# Patient Record
Sex: MALE | Race: WHITE | NOT HISPANIC OR LATINO | Employment: OTHER | URBAN - METROPOLITAN AREA
[De-identification: names, ages, dates, MRNs, and addresses within clinical notes are randomized per-mention and may not be internally consistent; named-entity substitution may affect disease eponyms.]

---

## 2020-08-20 LAB
BUN SERPL-MCNC: 26 MG/DL
CHLORIDE SERPL-SCNC: 106 MEQ/L
CO2 SERPL-SCNC: 25 MEQ/L
CREAT SERPL-MCNC: 1 MG/DL
EXTERNAL HEMATOCRIT: 27 %
EXTERNAL HEMOGLOBIN: 12.3 G/DL
EXTERNAL PLATELET COUNT: 212 K/ΜL
EXTERNAL PT (PROTIME): 11.9
EXTERNAL WBC: 13.6 G/DL
GLUCOSE SERPL-MCNC: 130 MG/DL
INR PPP: 1.1
POTASSIUM SERPL-SCNC: 4.5 MEQ/L
SODIUM SERPL-SCNC: 140 MEQ/L

## 2020-08-21 ENCOUNTER — HOSPITAL ENCOUNTER (INPATIENT)
Facility: REHABILITATION | Age: 70
LOS: 8 days | Discharge: HOME | DRG: 949 | End: 2020-08-29
Attending: PHYSICAL MEDICINE & REHABILITATION | Admitting: PHYSICAL MEDICINE & REHABILITATION
Payer: COMMERCIAL

## 2020-08-21 ENCOUNTER — BMR PREADMISSION ASSESSMENT (OUTPATIENT)
Dept: ADMISSIONS | Facility: REHABILITATION | Age: 70
End: 2020-08-21

## 2020-08-21 VITALS
OXYGEN SATURATION: 95 % | HEIGHT: 69 IN | WEIGHT: 165 LBS | BODY MASS INDEX: 24.44 KG/M2 | SYSTOLIC BLOOD PRESSURE: 116 MMHG | TEMPERATURE: 98.1 F | HEART RATE: 55 BPM | DIASTOLIC BLOOD PRESSURE: 65 MMHG

## 2020-08-21 DIAGNOSIS — C43.9 METASTATIC MELANOMA (CMS/HCC): Primary | ICD-10-CM

## 2020-08-21 DIAGNOSIS — F43.21 ADJUSTMENT DISORDER WITH DEPRESSED MOOD: ICD-10-CM

## 2020-08-21 PROBLEM — R56.9 SEIZURE (CMS/HCC): Status: ACTIVE | Noted: 2020-08-21

## 2020-08-21 PROBLEM — R52 PAIN: Status: ACTIVE | Noted: 2020-08-21

## 2020-08-21 PROBLEM — I10 HTN (HYPERTENSION): Status: ACTIVE | Noted: 2020-08-21

## 2020-08-21 PROBLEM — Z91.89 AT HIGH RISK FOR PRESSURE INJURY OF SKIN: Status: ACTIVE | Noted: 2020-08-21

## 2020-08-21 PROBLEM — E78.5 HYPERLIPIDEMIA: Status: ACTIVE | Noted: 2020-08-21

## 2020-08-21 PROBLEM — Z91.81 RISK FOR FALLS: Status: ACTIVE | Noted: 2020-08-21

## 2020-08-21 PROBLEM — C79.31 BRAIN METASTASES: Status: ACTIVE | Noted: 2020-08-21

## 2020-08-21 PROBLEM — Z91.89 AT HIGH RISK FOR DEEP VENOUS THROMBOSIS: Status: ACTIVE | Noted: 2020-08-21

## 2020-08-21 PROBLEM — Z09 FOLLOW UP: Status: ACTIVE | Noted: 2020-08-21

## 2020-08-21 PROBLEM — Z98.890 S/P CRANIOTOMY: Status: ACTIVE | Noted: 2020-08-21

## 2020-08-21 LAB
GLUCOSE BLD-MCNC: 129 MG/DL (ref 70–99)
GLUCOSE BLD-MCNC: 189 MG/DL (ref 70–99)
POCT TEST: ABNORMAL
POCT TEST: ABNORMAL

## 2020-08-21 PROCEDURE — 63700000 HC SELF-ADMINISTRABLE DRUG: Performed by: PHYSICAL MEDICINE & REHABILITATION

## 2020-08-21 PROCEDURE — 63600000 HC DRUGS/DETAIL CODE: Performed by: PHYSICAL MEDICINE & REHABILITATION

## 2020-08-21 PROCEDURE — 93005 ELECTROCARDIOGRAM TRACING: CPT | Performed by: PHYSICAL MEDICINE & REHABILITATION

## 2020-08-21 PROCEDURE — 63600000 HC DRUGS/DETAIL CODE: Performed by: STUDENT IN AN ORGANIZED HEALTH CARE EDUCATION/TRAINING PROGRAM

## 2020-08-21 PROCEDURE — 12800001 HC ROOM AND CARE SEMIPRIVATE REHAB-BRAIN INJ

## 2020-08-21 RX ORDER — IBUPROFEN 200 MG
16-32 TABLET ORAL AS NEEDED
Status: DISCONTINUED | OUTPATIENT
Start: 2020-08-21 | End: 2020-08-29 | Stop reason: HOSPADM

## 2020-08-21 RX ORDER — DEXAMETHASONE 2 MG/1
2 TABLET ORAL EVERY 12 HOURS
Status: DISCONTINUED | OUTPATIENT
Start: 2020-08-30 | End: 2020-08-29 | Stop reason: HOSPADM

## 2020-08-21 RX ORDER — ONDANSETRON 4 MG/1
4 TABLET, ORALLY DISINTEGRATING ORAL EVERY 8 HOURS PRN
Status: DISCONTINUED | OUTPATIENT
Start: 2020-08-21 | End: 2020-08-29 | Stop reason: HOSPADM

## 2020-08-21 RX ORDER — LISINOPRIL 10 MG/1
10 TABLET ORAL DAILY
Status: DISCONTINUED | OUTPATIENT
Start: 2020-08-22 | End: 2020-08-29 | Stop reason: HOSPADM

## 2020-08-21 RX ORDER — LEVETIRACETAM 500 MG/1
1000 TABLET ORAL 2 TIMES DAILY
Status: DISCONTINUED | OUTPATIENT
Start: 2020-08-21 | End: 2020-08-28

## 2020-08-21 RX ORDER — DEXAMETHASONE 2 MG/1
2 TABLET ORAL DAILY
Status: DISCONTINUED | OUTPATIENT
Start: 2020-09-03 | End: 2020-08-29 | Stop reason: HOSPADM

## 2020-08-21 RX ORDER — HEPARIN SODIUM 5000 [USP'U]/ML
5000 INJECTION, SOLUTION INTRAVENOUS; SUBCUTANEOUS EVERY 8 HOURS
Status: DISCONTINUED | OUTPATIENT
Start: 2020-08-21 | End: 2020-08-29 | Stop reason: HOSPADM

## 2020-08-21 RX ORDER — HEPARIN SODIUM 5000 [USP'U]/ML
INJECTION, SOLUTION INTRAVENOUS; SUBCUTANEOUS
COMMUNITY
End: 2020-08-29 | Stop reason: HOSPADM

## 2020-08-21 RX ORDER — ACETAMINOPHEN AND CODEINE PHOSPHATE 300; 30 MG/1; MG/1
1 TABLET ORAL EVERY 4 HOURS PRN
COMMUNITY
End: 2020-08-29 | Stop reason: HOSPADM

## 2020-08-21 RX ORDER — DEXTROSE 50 % IN WATER (D50W) INTRAVENOUS SYRINGE
25 AS NEEDED
Status: DISCONTINUED | OUTPATIENT
Start: 2020-08-21 | End: 2020-08-29 | Stop reason: HOSPADM

## 2020-08-21 RX ORDER — DEXAMETHASONE 4 MG/1
4 TABLET ORAL 2 TIMES DAILY WITH MEALS
COMMUNITY
End: 2020-08-29 | Stop reason: HOSPADM

## 2020-08-21 RX ORDER — DEXAMETHASONE 4 MG/1
4 TABLET ORAL EVERY 12 HOURS
Status: DISCONTINUED | OUTPATIENT
Start: 2020-08-25 | End: 2020-08-29 | Stop reason: HOSPADM

## 2020-08-21 RX ORDER — ONDANSETRON 4 MG/1
4 TABLET, ORALLY DISINTEGRATING ORAL EVERY 8 HOURS PRN
Status: ON HOLD | COMMUNITY
End: 2020-08-27 | Stop reason: SDUPTHER

## 2020-08-21 RX ORDER — LEVETIRACETAM 1000 MG/1
1000 TABLET ORAL 2 TIMES DAILY
Status: ON HOLD | COMMUNITY
End: 2020-08-27 | Stop reason: SDUPTHER

## 2020-08-21 RX ORDER — DEXAMETHASONE 4 MG/1
4 TABLET ORAL 2 TIMES DAILY WITH MEALS
Status: DISCONTINUED | OUTPATIENT
Start: 2020-08-22 | End: 2020-08-21

## 2020-08-21 RX ORDER — ATORVASTATIN CALCIUM 10 MG/1
10 TABLET, FILM COATED ORAL DAILY
Status: DISCONTINUED | OUTPATIENT
Start: 2020-08-21 | End: 2020-08-29 | Stop reason: HOSPADM

## 2020-08-21 RX ORDER — DEXTROSE 40 %
15-30 GEL (GRAM) ORAL AS NEEDED
Status: DISCONTINUED | OUTPATIENT
Start: 2020-08-21 | End: 2020-08-29 | Stop reason: HOSPADM

## 2020-08-21 RX ORDER — ACETAMINOPHEN 500 MG
650 TABLET ORAL EVERY 6 HOURS PRN
COMMUNITY
End: 2020-08-29 | Stop reason: HOSPADM

## 2020-08-21 RX ORDER — LISINOPRIL 10 MG/1
10 TABLET ORAL DAILY
Status: ON HOLD | COMMUNITY
End: 2020-08-27 | Stop reason: SDUPTHER

## 2020-08-21 RX ORDER — DEXAMETHASONE 4 MG/1
4 TABLET ORAL EVERY 6 HOURS
Status: COMPLETED | OUTPATIENT
Start: 2020-08-22 | End: 2020-08-25

## 2020-08-21 RX ORDER — INSULIN ASPART 100 [IU]/ML
3-5 INJECTION, SOLUTION INTRAVENOUS; SUBCUTANEOUS
Status: DISCONTINUED | OUTPATIENT
Start: 2020-08-21 | End: 2020-08-26

## 2020-08-21 RX ORDER — ATORVASTATIN CALCIUM 10 MG/1
10 TABLET, FILM COATED ORAL DAILY
Status: ON HOLD | COMMUNITY
End: 2020-08-27 | Stop reason: SDUPTHER

## 2020-08-21 RX ORDER — ACETAMINOPHEN 325 MG/1
650 TABLET ORAL EVERY 4 HOURS PRN
Status: DISCONTINUED | OUTPATIENT
Start: 2020-08-21 | End: 2020-08-29 | Stop reason: HOSPADM

## 2020-08-21 RX ORDER — DEXAMETHASONE 4 MG/1
8 TABLET ORAL EVERY 6 HOURS
Status: COMPLETED | OUTPATIENT
Start: 2020-08-21 | End: 2020-08-22

## 2020-08-21 RX ORDER — ACETAMINOPHEN AND CODEINE PHOSPHATE 300; 30 MG/1; MG/1
1 TABLET ORAL EVERY 4 HOURS PRN
Status: DISCONTINUED | OUTPATIENT
Start: 2020-08-21 | End: 2020-08-29 | Stop reason: HOSPADM

## 2020-08-21 RX ADMIN — LEVETIRACETAM 1000 MG: 500 TABLET ORAL at 21:43

## 2020-08-21 RX ADMIN — HEPARIN SODIUM 5000 UNITS: 5000 INJECTION, SOLUTION INTRAVENOUS; SUBCUTANEOUS at 21:43

## 2020-08-21 RX ADMIN — DEXAMETHASONE 8 MG: 4 TABLET ORAL at 21:43

## 2020-08-21 RX ADMIN — INSULIN ASPART 3 UNITS: 100 INJECTION, SOLUTION INTRAVENOUS; SUBCUTANEOUS at 21:50

## 2020-08-21 NOTE — ASSESSMENT & PLAN NOTE
PCP after discharge  Neurosurgeon Dr. Beau Barrera at [facility] in 4 to 6 weeks [phone].    Oncology NP Chyna Pugh 9/15/2020 10am [phone] for next round of chemo.

## 2020-08-21 NOTE — ASSESSMENT & PLAN NOTE
Mr. Jarrett is a 70-year-old male with known metastatic melanoma diagnosed in August 2019 receiving chemotherapy diagnosed with brain metastasis on 4/22/2020 in the right temporal lobe treated with gamma knife on 5/5/2020 found to have rapidly enlarging right frontal metastases on subsequent surveillance imaging.  He was admitted to [facility] on 8/18/2020.  Dr. Barrera from neurosurgery was consulted and performed craniotomy and mass resection.  He was started on Keppra for intraoperative seizure.  Postoperatively he was started on Decadron taper.   He was cleared to initiate heparin for DVT prophylaxis.   Next dose Nivolumab due 9/15/20.  He was ultimately determined to medically stable for transfer to [place] [facility] on 8/21/2020 for an acute inpatient rehabilitation program to address deficits related to metastatic melanoma with brain metastases status post craniotomy and resection.    He is weightbearing as tolerated.  He will participate in 3 hours of physical therapy, Occupational Therapy, and speech therapy as well as evaluation by psychology and 24-hour rehab nursing care.  We will monitor his scalp incision daily.  Remove scalp incision staples by 9/1/2020.  He will follow-up with his surgeon Dr. Barrera at [facility] in 4 to 6 weeks [phone].  Follow-up with his oncology NP Chyna Pugh 9/15/2020 10am [phone] for next round of chemo.

## 2020-08-21 NOTE — HOSPITAL COURSE
This pt was assessed using the protocols established during the corona virus pandemic     69 yo male with metastatic melanoma diagnosed 8/2019Malignant melanoma of back (CMS-HCC)   -Diagnosed via biopsy 8/27/19   - Chemo with Nivolumab started 10/1/19, next dose due 9/1/20   ,   He had a CT neck performed on 4/22/20 that showed an enhancing brain lesion.   This was followed up by brain MRI on 4/23 that showed a single 1.4 cm enhancing lesion in the right mesial temporal lobe  . He had GK to the right temporal metastasis on 5/5/20. Repeat follow up MRI showed rapidly enlarging right frontal lesion, now measuring 1/8x 1.7.Dr Barrera was called and is recommending direct admission to [facility] and urgent surgery to remove brain metastasis due to rapid growth and then planned GK to resection cavity.     Admitted to [facility] 8-18-20 for craniotomy by Dr DANIELLE Barrera   Guy Jarrett is a 70 y.o. male with history of right frontal parasagittal brain metastasis s/pof right frontal parasagittal brain metastasis, now s/p right frontal craniotomy by Dr. Barrera on 8/19/20   Interval history:   - OR for R frontal craniotomy for tumor   - Intra-op seizure   - Post-op left side initially flaccid, gradually improved   - Frozen path consistent with met   - MRI performed           Malignant melanoma metastatic to brain with edema s/p right craniotomy 8/19   -Initial GK for right temporal brain metastasis 5/5/20   -High Decadron taper for vasogenic edema (holding home prednisone which was for arthritis, patient self d/c'd)   -Keppra 1g BID - possible intra-op sz managed with Propofol (Left side shaking)--> continue until follow up   LTM revealed focal slowing but no epileptiform activity or  Seizures     -Subgaleal Hemovac (off suction)   Malignant melanoma of back (CMS-HCC)   -Diagnosed via biopsy 8/27/19   - Chemo with Nivolumab started 10/1/19, next dose due 9/1/20     Pain control   -Tylenol PRN     High blood pressure   -home Lisinopril     Mixed  hyperlipidemia   -Continue home atorvastatin     Arthritis   -Arthritis, immunotherapy exacerbated   -prednisone 10 mg daily in am (patient had self discontinued)     Prophylaxis   --Per protocol     Disposition   -PT /OT recommends acute rehab  He is currently AAO X 3 very talkative  Part with therapy      R side 5/5; Left side upper 4/5, lower 3/5 hip, plantPatient presents with left sided inattention, impaired balance, impaired postural control, left LE strength deficits, impaired coordination and motor planning. Patient was able to complete transfers with min assist x 2 and short distance gait training with mod assist x 2 with max cueing for left LE advancement and lateral weight shifting  ar 2/5 dorsiflexion 0/5.     Hemovac to gravity should dc today

## 2020-08-21 NOTE — PLAN OF CARE
Problem: Rehabilitation (IRF) Plan of Care  Goal: Plan of Care Review  Flowsheets (Taken 8/21/2020 1800)  Plan of Care Reviewed With: patient  IRF Plan of Care Review: progress ongoing, continue  Outcome Summary: pt oriented to room/unit

## 2020-08-21 NOTE — H&P
PMR H&P  Admitting Diagnosis: Brain metastases (CMS/HCC) [C79.31]  HPI     Guy Jarrett is admitted to [facility] for comprehensive inpatient rehabilitation for Brain Injury with functional deficits in cognitive function;self-care;mobility;safety. Patient is receiving the following services: occupational therapy;physical therapy;speech language pathology (test for higher level cog defecits ).  Mr. Jarrett is a 70-year-old male with known metastatic melanoma diagnosed in August 2019 receiving chemotherapy diagnosed with brain metastasis on 4/22/2020 in the right temporal lobe treated with gamma knife on 5/5/2020 found to have rapidly enlarging right frontal metastases on subsequent surveillance imaging.  He was admitted to [facility] on 8/18/2020.  Dr. Barrera from neurosurgery was consulted and performed craniotomy and mass resection.  He was started on Keppra for intraoperative seizure.  Postoperatively he was started on Decadron taper.   He was cleared to initiate heparin for DVT prophylaxis.   Next dose Nivolumab due 9/15/20.  He was ultimately determined to medically stable for transfer to [place] rehab on 8/21/2020 for an acute inpatient rehabilitation program to address deficits related to metastatic melanoma with brain metastases status post craniotomy and resection.    He admits to persistent left-sided weakness affecting lower extremity more than upper extremity.  Denies any headache, vision changes, fever, chills, sweats, abdominal discomfort, nausea vomiting, new numbness, tingling.      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    Social History:   Social History     Social History Narrative   • Not on file     Prior Living Arrangements  Lives With: spouse;child(nicci), dependent  Living Arrangements: house  Stair Railings, Main Entrance: railings on both sides of stairs    Premorbid Function  Dominant Hand: right  Ambulation: independent  Transferring: independent  Toileting: independent  Bathing: independent  Dressing: independent  Eating: independent  Communication: understands/communicates without difficulty  Swallowing: swallows foods/liquids without difficulty  Baseline Diet/Method of Nutritional Intake: thin liquids;regular solids  Assistive Device/Animal Currently Used at Home: none  Prior Level of Function Comment: was indep PTA      Family History: No family history on file.  History also provided by:     Allergies: Penicillins       Medication List      ASK your doctor about these medications    acetaminophen 500 mg tablet  Commonly known as:  TYLENOL  Take 650 mg by mouth every 6 (six) hours as needed for mild pain.  Dose:  650 mg     acetaminophen-codeine 300-30 mg per tablet  Commonly known as:  TYLENOL #3  Take 1 tablet by mouth every 4 (four) hours as needed for moderate pain.  Dose:  1 tablet     atorvastatin 10 mg tablet  Commonly known as:  LIPITOR  Take 10 mg by mouth daily.  Dose:  10 mg     dexAMETHasone 4 mg tablet  Commonly known as:  DECADRON  Take 4 mg by mouth 2 (two) times a day with meals.  Dose:  4 mg     heparin (porcine) 5,000 unit/mL syringe  Inject as directed.     insulin aspart (niacinamide) 100 unit/mL injection  Commonly known as:  FIASP  Inject under the skin.     levETIRAcetam 1,000 mg tablet  Commonly known as:  KEPPRA  Take 1,000 mg by mouth 2 (two) times a  day.  Dose:  1,000 mg     lisinopriL 10 mg tablet  Commonly known as:  PRINIVIL  Take 10 mg by mouth daily.  Dose:  10 mg     ondansetron ODT 4 mg disintegrating tablet  Commonly known as:  ZOFRAN-ODT  Take 4 mg by mouth every 8 (eight) hours as needed for nausea or vomiting.  Dose:  4 mg          Review of Systems  All other systems reviewed and negative except as noted in the HPI.    Objective     Vital Signs for the last 24 hours:  Temp:  [36.7 °C (98.1 °F)] 36.7 °C (98.1 °F)  Heart Rate:  [55] 55  BP: (116)/(65) 116/65    Physical Exam  Physical Exam   Constitutional: He is oriented to person, place, and time. Vital signs are normal. He appears well-developed and well-nourished.   HENT:   Head: Normocephalic.       Eyes: Pupils are equal, round, and reactive to light. Conjunctivae and EOM are normal.   Cardiovascular: Normal rate and regular rhythm.   Pulmonary/Chest: Effort normal and breath sounds normal.   Abdominal: Soft. Normal appearance and bowel sounds are normal.   Genitourinary:   Genitourinary Comments: No purvis catheter   Musculoskeletal:        Right hand: He exhibits decreased range of motion.   No jt erythema, warmth or effusion; decreased AROM R hand (which pt states is chronic related to cervical radiculopathy)   Neurological: He is alert and oriented to person, place, and time. He displays no tremor. No cranial nerve deficit or sensory deficit. He exhibits normal muscle tone. He displays no seizure activity. Coordination abnormal.   Fluent, follows commands, facial muscle symmetric, tongue midline protrusion, sensation light touch intact, strength testing reveals 5/5 strength deltoid, biceps, triceps, wrist extensors, wrist flexors, and 4/5 strength finger flexors, 3+/5 strength dorsal interossei right upper extremity; 5/5 strength throughout right lower extremity; 4/5 strength throughout left upper extremity, 3+/5 strength hip flexors, knee flexors, knee extensors, 2/5 strength ankle plantar  flexors, 0/5 strength dorsiflexors, 1/5 strength toe extensors left lower extremity.  Tone 0/4.  Impaired coordination left side, fix left upper extremity on rapid alternating movement.   Skin: Skin is warm, dry and intact.   Psychiatric: He has a normal mood and affect. His speech is normal and behavior is normal.         Current Function  Mobility  Gait  Comment: amb 3-5 ft foward and backward with HHA decreased griffin decreased step length req max cues for safety and direction     Stairs     Wheelchair     Transfers  Comment (Bed Mobility): min a X 1   Comment: Mod a X 2 HHA spt to chair  + knee buckling left   Comment: Mod a x 2 HHA   Comment: Mod a x 2 HHA   Comment: Min a X 2   Comment: Min a x 2 HHA       Self Care     Cognition  Comment, Cognition: AAO X 3 follows commands + Tangential    Communication  Comment, Motor Speech Assessment: intelligable speech   Comment, Assessment (Auditory Comprehension): wnl does req cues for redirection       Labs  No new labs.    Imaging  Not applicable        Assessment/Plan     Metastatic melanoma (CMS/HCC)  Mr. Jarrett is a 70-year-old male with known metastatic melanoma diagnosed in August 2019 receiving chemotherapy diagnosed with brain metastasis on 4/22/2020 in the right temporal lobe treated with gamma knife on 5/5/2020 found to have rapidly enlarging right frontal metastases on subsequent surveillance imaging.  He was [facility] to hospitals on 8/18/2020.  Dr. Barrera from neurosurgery was consulted and performed craniotomy and mass resection.  He was started on Keppra for intraoperative seizure.  Postoperatively he was started on Decadron taper.   He was cleared to initiate heparin for DVT prophylaxis.   Next dose Nivolumab due 9/15/20.  He was ultimately determined to medically stable for transfer to [facility] on 8/21/2020 for an acute inpatient rehabilitation program to address deficits related to metastatic melanoma with brain metastases status post craniotomy and  resection.    He is weightbearing as tolerated.  He will participate in 3 hours of physical therapy, Occupational Therapy, and speech therapy as well as evaluation by psychology and 24-hour rehab nursing care.  We will monitor his scalp incision daily.  Remove scalp incision staples by 9/1/2020.  He will follow-up with his surgeon Dr. Barrera at [facility] in 4 to 6 weeks [phone].  Follow-up with his oncology NP Chyna Pugh 9/15/2020 10a [phone] for next round of chemo.    HTN (hypertension)  Continue lisinopril    Hyperlipidemia  Continue lipitor    At high risk for deep venous thrombosis  SC heparin    At high risk for pressure injury of skin  Turns every 2 hours, Desitin to sacral area    Risk for falls  Wean off restraints as able    Seizure (CMS/HCC)  Continue Keppra    Pain  Tylenol, Tylenol 3 as needed    Follow up  PCP after discharge  Neurosurgeon Dr. Beau Barrera at [facility] in 4 to 6 weeks [phone].    Oncology NP Chyna Pugh 9/15/2020 [facility] [phone] for next round of chemo.        Risk for Complications  DVT: High  Falls: Moderate  Infection: Moderate      Expected Level of Function  Expected Functional Improvement: cognitive function;self-care;mobility;safety  Self-Care: Independent  Sphincter Control: Independent  Transfers: Supervision or touching assistance  Locomotion: Supervision or touching assistance  Communication: Independent  Social Cognition: Independent      Anticipated Discharge Plan  Anticipated Discharge Disposition: home with outpatient services  Type of Outpatient Services: physical therapy (chemo )      Plan of care was discussed with patient  I have reviewed the pre-admission screening and there are no relevant changes.  Expected length of stay: 14 days        Code Status: Full Code    Post Admission Physician Evaluation    Guy Jarrett is admitted to [facility] for comprehensive inpatient rehabilitation for Brain Injury with functional deficits  in cognitive function;self-care;mobility;safety. Patient is receiving the following services: occupational therapy;physical therapy;speech language pathology (test for higher level cog defecits ).    Active medical management is required for   Patient Active Problem List   Diagnosis   • Metastatic melanoma (CMS/HCC)   • Brain metastases (CMS/HCC)   • HTN (hypertension)   • Hyperlipidemia   • S/P craniotomy   • At high risk for deep venous thrombosis   • At high risk for pressure injury of skin   • Risk for falls   • Seizure (CMS/HCC)   • Pain   • Follow up       Premorbid Function  Dominant Hand: right  Ambulation: independent  Transferring: independent  Toileting: independent  Bathing: independent  Dressing: independent  Eating: independent  Communication: understands/communicates without difficulty  Swallowing: swallows foods/liquids without difficulty  Baseline Diet/Method of Nutritional Intake: thin liquids;regular solids  Assistive Device/Animal Currently Used at Home: none  Prior Level of Function Comment: was indep PTA      Current Function  Gait  Comment: amb 3-5 ft foward and backward with HHA decreased griffin decreased step length req max cues for safety and direction     Stairs     Wheelchair     Transfers  Comment (Bed Mobility): min a X 1   Comment: Mod a X 2 HHA spt to chair  + knee buckling left   Comment: Mod a x 2 HHA   Comment: Mod a x 2 HHA   Comment: Min a X 2   Comment: Min a x 2 HHA       Self Care     Cognition  Comment, Cognition: AAO X 3 follows commands + Tangential    Communication  Comment, Motor Speech Assessment: intelligable speech   Comment, Assessment (Auditory Comprehension): wnl does req cues for redirection     Swallow  Comment, Swallowing Recommendations: reg/thins      Risk for Complications  DVT: High  Falls: Moderate  Infection: Moderate      Expected Level of Function  Expected Functional Improvement: cognitive function;self-care;mobility;safety  Self-Care:  Independent  Sphincter Control: Independent  Transfers: Supervision or touching assistance  Locomotion: Supervision or touching assistance  Communication: Independent  Social Cognition: Independent      Anticipated Discharge Plan  Anticipated Discharge Disposition: home with outpatient services  Type of Outpatient Services: physical therapy (chemo )      I have reviewed the pre-admission screening and there are no relevant changes.    Expected length of stay: 14 days

## 2020-08-21 NOTE — BMR PREADMISSION NOTE
[facility]  [facility] Assessment    Patient Name: Guy Jarrett  YOB: 1950    Referral Date: 08/20/20  Evaluation Date: 08/21/20  Referring Facility Admission Date: 08/18/20  Referring Facility: [facility]   ReferringProvider:  DR DANIELLE Barrera    Reason for Referral: Guy Jarrett is a 70 y.o. male whose primary indication for inpatient rehabilitation is Brain Injury.   Pertinent History of Current Functional Problem:  This pt was assessed using the protocols established during the corona virus pandemic     69 yo male with metastatic melanoma diagnosed 8/2019Malignant melanoma of back (CMS-HCC)   -Diagnosed via biopsy 8/27/19   - Chemo with Nivolumab started 10/1/19, next dose due 9/1/20   ,   He had a CT neck performed on 4/22/20 that showed an enhancing brain lesion.   This was followed up by brain MRI on 4/23 that showed a single 1.4 cm enhancing lesion in the right mesial temporal lobe  . He had GK to the right temporal metastasis on 5/5/20. Repeat follow up MRI showed rapidly enlarging right frontal lesion, now measuring 1/8x 1.7.Dr Barrera was called and is recommending direct admission to [facility] and urgent surgery to remove brain metastasis due to rapid growth and then planned GK to resection cavity.     Admitted to [facility] 8-18-20 for craniotomy by Dr DANIELLE Barrera   Guy Jarrett is a 70 y.o. male with history of right frontal parasagittal brain metastasis s/pof right frontal parasagittal brain metastasis, now s/p right frontal craniotomy by Dr. Barrera on 8/19/20   Interval history:   - OR for R frontal craniotomy for tumor   - Intra-op seizure   - Post-op left side initially flaccid, gradually improved   - Frozen path consistent with met   - MRI performed           Malignant melanoma metastatic to brain with edema s/p right craniotomy 8/19   -Initial GK for right temporal brain metastasis 5/5/20   -High Decadron taper for vasogenic edema (holding home prednisone which  was for arthritis, patient self d/c'd)   -Keppra 1g BID - possible intra-op sz managed with Propofol (Left side shaking)--> continue until follow up   LTM revealed focal slowing but no epileptiform activity or  Seizures     -Subgaleal Hemovac (off suction)   Malignant melanoma of back (CMS-HCC)   -Diagnosed via biopsy 8/27/19   - Chemo with Nivolumab started 10/1/19, next dose due 9/1/20     Pain control   -Tylenol PRN     High blood pressure   -home Lisinopril     Mixed hyperlipidemia   -Continue home atorvastatin     Arthritis   -Arthritis, immunotherapy exacerbated   -prednisone 10 mg daily in am (patient had self discontinued)     Prophylaxis   --Per protocol     Disposition   -PT /OT recommends acute rehab  He is currently AAO X 3 very talkative  Part with therapy      R side 5/5; Left side upper 4/5, lower 3/5 hip, plantPatient presents with left sided inattention, impaired balance, impaired postural control, left LE strength deficits, impaired coordination and motor planning. Patient was able to complete transfers with min assist x 2 and short distance gait training with mod assist x 2 with max cueing for left LE advancement and lateral weight shifting  ar 2/5 dorsiflexion 0/5.     Hemovac to gravity should dc today       Active Medical Conditions:  Patient Active Problem List   Diagnosis   • Metastatic melanoma (CMS/HCC)   • Brain metastases (CMS/HCC)   • HTN (hypertension)   • Hyperlipidemia   • S/P craniotomy       Active Medications:  Active Medications   Medication Sig Dispense Refill   • acetaminophen (TYLENOL) 500 mg tablet Take 650 mg by mouth every 6 (six) hours as needed for mild pain.     • acetaminophen-codeine (TYLENOL #3) 300-30 mg per tablet Take 1 tablet by mouth every 4 (four) hours as needed for moderate pain.     • atorvastatin (LIPITOR) 10 mg tablet Take 10 mg by mouth daily.     • dexAMETHasone (DECADRON) 4 mg tablet Take 4 mg by mouth 2 (two) times a day with meals.     • heparin  sodium,porcine (HEPARIN, PORCINE,) 5,000 unit/mL syringe Inject as directed.     • insulin aspart, niacinamide, (FIASP) 100 unit/mL injection Inject under the skin.     • levETIRAcetam (KEPPRA) 1,000 mg tablet Take 1,000 mg by mouth 2 (two) times a day.     • lisinopriL (PRINIVIL) 10 mg tablet Take 10 mg by mouth daily.     • ondansetron ODT (ZOFRAN-ODT) 4 mg disintegrating tablet Take 4 mg by mouth every 8 (eight) hours as needed for nausea or vomiting.     No medication comments found.    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  Tobacco Use as of 8/21/2020     Smoking Status Smoking Start Date Smoking Quit Date Packs/Day Years Used    Never Assessed -- -- -- --    Types Comments Smokeless Tobacco Status Smokeless Tobacco Quit Date Source    -- -- Unknown -- --             Allergies  Allergies   Allergen Reactions   • Penicillins             Premorbid Functional Status:   Dominant Hand: right  Ambulation: independent  Transferring: independent  Toileting: independent  Bathing: independent  Dressing: independent  Eating: independent  Communication: understands/communicates without difficulty  Swallowing: swallows foods/liquids without difficulty  Baseline Diet/Method of Nutritional Intake: thin liquids;regular solids  Assistive Device/Animal Currently Used at Home: none  Prior Level of Function Comment: was indep PTA           Living Environment:  Lives With: spouse;child(nicci), dependent  Living Arrangements: house  Stair Railings, Main Entrance: railings on both sides of stairs      TEST RESULTS:     Chemistry (Up to last 3 results from the past 720 hours)      08/20    Sodium       140       Potassium       4.5       BUN       26       Creatinine       1.0       Glucose       130       CO2       25        Chloride       106         Hepatic (Up to last 3 results from the past 720 hours)    None      Metabolic (Up to last 3 results from the past 720 hours)    None      Hematologic (Up to last 3 results from the past 720 hours)      08/20    WBC       13.6       Hemoglobin       12.3       Hematocrit       27       Platelets       212       Protime       11.9       INR       1.1         Other (Up to last 3 results from the past 720 hours)      08/20    INR       1.1            Diagnostics  Diagnostics: MRI  CT Date: 08/19/20  CT Result: Status post recent craniotomy in the right parasagittal vertex for resection of known parasagittal brain mass. Small amount of air is present within the resection cavity and along the antidependent right frontal convexity. There is persistent edema surrounding the resection cavity extending into the motor strip and superior frontal gyrus, not significantly changed from presurgical MRI. There is no significant midline shift. No acute transcortical infarction. Ventricles are normal. Cerebral volume is age appropriate. Visualized orbits are unremarkable. Visualized paranasal sinuses are essentially clear. Postsurgical changes from right mastoidectomy. Overlying skin staple is present along with a subgaleal drain.   CXR Date: 08/18/20  CXR Result: No evidence of active disease in the chest.  MRI Date: 08/18/20  MRI Result: 1.  New 1.8 cm enhancing lesion within the right parasagittal frontal lobe with moderate surrounding vasogenic edema concerning for metastasis  Stable size of the treated right temporal lobe metastasis. Previously noted right superior frontal gyral lesion is no longer visualized, correlate with interval treatment history.    ASSESSMENT:       Vitals: Temp:  [36.7 °C (98.1 °F)] 36.7 °C (98.1 °F)  Heart Rate:  [55] 55  BP: (116)/(65) 116/65         Lines/Drains/Airways:   Lines, Drains & Airways  Lines, Drains & Airways: Incision  Incision Date: 08/19/20  Incision  Comments: open to air staples         Risk for Clinical Complications:  DVT: High  Falls: Moderate  Infection: Moderate         Precautions:  Existing Precautions/Restrictions: fall;seizures           Current Diet:   Diet: thin liquids, regular solids         Current Functional Status:  Preadmission Current Function     Row Name 08/21/20 0800       Cognition/Psychosocial    Comment, Cognition  AAO X 3 follows commands + Tangential       Motor Speech    Comment, Motor Speech Assessment  intelligable speech        Auditory Comprehension    Comment, Assessment (Auditory Comprehension)  wnl does req cues for redirection        Verbal Expression    Comment, Assesment (Verbal Expression)  spont speech        Swallowing Recommendations    Comment, Swallowing Recommendations  reg/thins       Basic Activities of Daily Living (BADLs)    Basic Activities of Daily Living  -- Min a with UB dressing mod a with LB grooming        Bed Mobility    Comment (Bed Mobility)  min a X 1        Transfers    Comment  Mod a X 2 HHA spt to chair  + knee buckling left        Bed to Chair Transfer    Comment  Mod a x 2 HHA        Chair to Bed Transfer    Comment  Mod a x 2 HHA        Sit to Stand Transfer    Comment  Min a X 2        Stand to Sit Transfer    Comment  Min a x 2 HHA       Gait Training    Comment  amb 3-5 ft foward and backward with HHA decreased griffin decreased step length req max cues for safety and direction                Support System:   Designated Primary Caregiver: spouse Brittany [phone]  Availability, Primary Caregiver: available all the time (24 hrs/day)  Health, Primary Caregiver: no health issues  Physical Limitations, Primary Caregiver: no physical limitations  Health Advocacy: family advocates for patient's health  Caregiver Engagement: advocates for the patient           Patient/Family Goals:   Patient's Goals For Discharge: return to all previous roles/activities  Family Goals For Discharge: patient able  to return to all previous activities/roles           Educational Background:      RECOMMENDATIONS / PLAN:       Special Needs:            Plan:  Identified Referral Needs: occupational therapy, physical therapy, speech language pathology(test for higher level cog defecits )  OT Frequency: 5-7 times per week  OT Intensity: 1.5 hours  PT Frequency: 5-7 times per week  PT Intensity: 1.5 hours  SLP Frequency: 3-5 times per week  SLP Intensity: 0.5 hours  Impairments to be addressed: cognitive function, self-care, mobility, safety    Medical Necessity Admission Criteria: other active medical conditions (see comments)(met melanoma .htn,arthritis HL< )  Projected Length of Stay (days): 14 days  Patient is willing to participate in rehab program: yes         Expected Level of Function at Discharge:  Expected Functional Improvement: cognitive function;self-care;mobility;safety  Self-Care: Independent  Sphincter Control: Independent  Transfers: Supervision or touching assistance  Locomotion: Supervision or touching assistance  Communication: Independent  Social Cognition: Independent           Post-Discharge Needs:  Anticipated Discharge Disposition: home with outpatient services  Type of Outpatient Services: physical therapy (chemo )

## 2020-08-21 NOTE — BMR PREADMISSION NOTE
[facility]  [facility]    Patient Name: Guy Jarrett  YOB: 1950    Referral Date: 08/20/20  Evaluation Date: 08/21/20  Referring Facility Admission Date: 08/18/20  Referring Facility: [facility]   ReferringProvider:  DR DANIELLE Barrera    Reason for Referral: Guy Jarrett is a 70 y.o. male whose primary indication for inpatient rehabilitation is Brain Injury.   Pertinent History of Current Functional Problem:  This pt was assessed using the protocols established during the corona virus pandemic     69 yo male with metastatic melanoma diagnosed 8/2019Malignant melanoma of back (CMS-HCC)   -Diagnosed via biopsy 8/27/19   - Chemo with Nivolumab started 10/1/19, next dose due 9/1/20   ,   He had a CT neck performed on 4/22/20 that showed an enhancing brain lesion.   This was followed up by brain MRI on 4/23 that showed a single 1.4 cm enhancing lesion in the right mesial temporal lobe  . He had GK to the right temporal metastasis on 5/5/20. Repeat follow up MRI showed rapidly enlarging right frontal lesion, now measuring 1/8x 1.7.Dr Barrera was called and is recommending direct admission to [facility] and urgent surgery to remove brain metastasis due to rapid growth and then planned GK to resection cavity.     Admitted to [facility] 8-18-20 for craniotomy by Dr DANIELLE Barrera   Guy Jarrett is a 70 y.o. male with history of right frontal parasagittal brain metastasis s/pof right frontal parasagittal brain metastasis, now s/p right frontal craniotomy by Dr. Barrera on 8/19/20   Interval history:   - OR for R frontal craniotomy for tumor   - Intra-op seizure   - Post-op left side initially flaccid, gradually improved   - Frozen path consistent with met   - MRI performed           Malignant melanoma metastatic to brain with edema s/p right craniotomy 8/19   -Initial GK for right temporal brain metastasis 5/5/20   -High Decadron taper for vasogenic edema (holding home prednisone which  was for arthritis, patient self d/c'd)   -Keppra 1g BID - possible intra-op sz managed with Propofol (Left side shaking)--> continue until follow up   LTM revealed focal slowing but no epileptiform activity or  Seizures     -Subgaleal Hemovac (off suction)   Malignant melanoma of back (CMS-HCC)   -Diagnosed via biopsy 8/27/19   - Chemo with Nivolumab started 10/1/19, next dose due 9/1/20     Pain control   -Tylenol PRN     High blood pressure   -home Lisinopril     Mixed hyperlipidemia   -Continue home atorvastatin     Arthritis   -Arthritis, immunotherapy exacerbated   -prednisone 10 mg daily in am (patient had self discontinued)     Prophylaxis   --Per protocol     Disposition   -PT /OT recommends acute rehab  He is currently AAO X 3 very talkative  Part with therapy      R side 5/5; Left side upper 4/5, lower 3/5 hip, plantPatient presents with left sided inattention, impaired balance, impaired postural control, left LE strength deficits, impaired coordination and motor planning. Patient was able to complete transfers with min assist x 2 and short distance gait training with mod assist x 2 with max cueing for left LE advancement and lateral weight shifting  ar 2/5 dorsiflexion 0/5.     Hemovac to gravity should dc today       Active Medical Conditions:  Patient Active Problem List   Diagnosis   • Metastatic melanoma (CMS/HCC)   • Brain metastases (CMS/HCC)   • HTN (hypertension)   • Hyperlipidemia   • S/P craniotomy       Active Medications:  No medication comments found.    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  Tobacco Use as of 8/21/2020     Smoking Status Smoking Start Date Smoking Quit Date Packs/Day Years Used    Never Assessed -- -- -- --    Types Comments Smokeless Tobacco Status Smokeless  Tobacco Quit Date Source    -- -- Unknown -- --             Allergies  Allergies   Allergen Reactions   • Penicillins             Premorbid Functional Status:   Dominant Hand: right  Ambulation: independent  Transferring: independent  Toileting: independent  Bathing: independent  Dressing: independent  Eating: independent  Communication: understands/communicates without difficulty  Swallowing: swallows foods/liquids without difficulty  Baseline Diet/Method of Nutritional Intake: thin liquids;regular solids  Assistive Device/Animal Currently Used at Home: none  Prior Level of Function Comment: was indep PTA           Living Environment:  Lives With: spouse;child(nicci), dependent  Living Arrangements: house  Stair Railings, Main Entrance: railings on both sides of stairs      TEST RESULTS:     Chemistry (Up to last 3 results from the past 720 hours)      08/20    Sodium       140       Potassium       4.5       BUN       26       Creatinine       1.0       Glucose       130       CO2       25       Chloride       106         Hepatic (Up to last 3 results from the past 720 hours)    None      Metabolic (Up to last 3 results from the past 720 hours)    None      Hematologic (Up to last 3 results from the past 720 hours)      08/20    WBC       13.6       Hemoglobin       12.3       Hematocrit       27       Platelets       212       Protime       11.9       INR       1.1         Other (Up to last 3 results from the past 720 hours)      08/20    INR       1.1            Diagnostics  Diagnostics: MRI  CT Date: 08/19/20  CT Result: Status post recent craniotomy in the right parasagittal vertex for resection of known parasagittal brain mass. Small amount of air is present within the resection cavity and along the antidependent right frontal convexity. There is persistent edema surrounding the resection cavity extending into the motor strip and superior frontal gyrus, not significantly changed from presurgical MRI. There is  no significant midline shift. No acute transcortical infarction. Ventricles are normal. Cerebral volume is age appropriate. Visualized orbits are unremarkable. Visualized paranasal sinuses are essentially clear. Postsurgical changes from right mastoidectomy. Overlying skin staple is present along with a subgaleal drain.   CXR Date: 08/18/20  CXR Result: No evidence of active disease in the chest.  MRI Date: 08/18/20  MRI Result: 1.  New 1.8 cm enhancing lesion within the right parasagittal frontal lobe with moderate surrounding vasogenic edema concerning for metastasis  Stable size of the treated right temporal lobe metastasis. Previously noted right superior frontal gyral lesion is no longer visualized, correlate with interval treatment history.    ASSESSMENT:       Vitals: Temp:  [36.7 °C (98.1 °F)] 36.7 °C (98.1 °F)  Heart Rate:  [55] 55  BP: (116)/(65) 116/65         Lines/Drains/Airways:   Lines, Drains & Airways  Lines, Drains & Airways: Incision  Incision Date: 08/19/20  Incision Comments: open to air staples         Risk for Clinical Complications:  DVT: High  Falls: Moderate  Infection: Moderate         Precautions:  Existing Precautions/Restrictions: fall;seizures           Current Diet:   Diet: thin liquids, regular solids         Current Functional Status:  Preadmission Current Function     Row Name 08/21/20 0800       Cognition/Psychosocial    Comment, Cognition  AAO X 3 follows commands + Tangential       Motor Speech    Comment, Motor Speech Assessment  intelligable speech        Auditory Comprehension    Comment, Assessment (Auditory Comprehension)  wnl does req cues for redirection        Verbal Expression    Comment, Assesment (Verbal Expression)  spont speech        Swallowing Recommendations    Comment, Swallowing Recommendations  reg/thins       Basic Activities of Daily Living (BADLs)    Basic Activities of Daily Living  -- Min a with UB dressing mod a with LB grooming        Bed Mobility     Comment (Bed Mobility)  min a X 1        Transfers    Comment  Mod a X 2 HHA spt to chair  + knee buckling left        Bed to Chair Transfer    Comment  Mod a x 2 HHA        Chair to Bed Transfer    Comment  Mod a x 2 HHA        Sit to Stand Transfer    Comment  Min a X 2        Stand to Sit Transfer    Comment  Min a x 2 HHA       Gait Training    Comment  amb 3-5 ft foward and backward with HHA decreased griffin decreased step length req max cues for safety and direction                Support System:   Designated Primary Caregiver: spouse Brittany [phone]  Availability, Primary Caregiver: available all the time (24 hrs/day)  Health, Primary Caregiver: no health issues  Physical Limitations, Primary Caregiver: no physical limitations  Health Advocacy: family advocates for patient's health  Caregiver Engagement: advocates for the patient           Patient/Family Goals:   Patient's Goals For Discharge: return to all previous roles/activities  Family Goals For Discharge: patient able to return to all previous activities/roles           Educational Background:      RECOMMENDATIONS / PLAN:       Special Needs:            Plan:  Identified Referral Needs: occupational therapy, physical therapy, speech language pathology(test for higher level cog defecits )  OT Frequency: 5-7 times per week  OT Intensity: 1.5 hours  PT Frequency: 5-7 times per week  PT Intensity: 1.5 hours  SLP Frequency: 3-5 times per week  SLP Intensity: 0.5 hours  Impairments to be addressed: cognitive function, self-care, mobility, safety    Medical Necessity Admission Criteria: other active medical conditions (see comments)(met melanoma .htn,arthritis HL< )  Projected Length of Stay (days): 14 days  Patient is willing to participate in rehab program: yes         Expected Level of Function at Discharge:  Expected Functional Improvement: cognitive function;self-care;mobility;safety  Self-Care: Independent  Sphincter Control:  Independent  Transfers: Supervision or touching assistance  Locomotion: Supervision or touching assistance  Communication: Independent  Social Cognition: Independent           Post-Discharge Needs:  Anticipated Discharge Disposition: home with outpatient services  Type of Outpatient Services: physical therapy (chemo )

## 2020-08-22 ENCOUNTER — APPOINTMENT (INPATIENT)
Dept: SPEECH THERAPY | Facility: REHABILITATION | Age: 70
DRG: 949 | End: 2020-08-22
Payer: COMMERCIAL

## 2020-08-22 ENCOUNTER — APPOINTMENT (INPATIENT)
Dept: OCCUPATIONAL THERAPY | Facility: REHABILITATION | Age: 70
DRG: 949 | End: 2020-08-22
Payer: COMMERCIAL

## 2020-08-22 ENCOUNTER — APPOINTMENT (INPATIENT)
Dept: PHYSICAL THERAPY | Facility: REHABILITATION | Age: 70
DRG: 949 | End: 2020-08-22
Payer: COMMERCIAL

## 2020-08-22 LAB
ALBUMIN SERPL-MCNC: 3.3 G/DL (ref 3.4–5)
ALP SERPL-CCNC: 81 IU/L (ref 35–126)
ALT SERPL-CCNC: 38 IU/L (ref 16–63)
ANION GAP SERPL CALC-SCNC: 9 MEQ/L (ref 3–15)
AST SERPL-CCNC: 24 IU/L (ref 15–41)
BASOPHILS # BLD: 0.01 K/UL (ref 0.01–0.1)
BASOPHILS NFR BLD: 0.1 %
BILIRUB SERPL-MCNC: 0.5 MG/DL (ref 0.3–1.2)
BUN SERPL-MCNC: 41 MG/DL (ref 8–20)
CALCIUM SERPL-MCNC: 8.6 MG/DL (ref 8.9–10.3)
CHLORIDE SERPL-SCNC: 106 MEQ/L (ref 98–109)
CO2 SERPL-SCNC: 24 MEQ/L (ref 22–32)
CREAT SERPL-MCNC: 1 MG/DL (ref 0.8–1.3)
DIFFERENTIAL METHOD BLD: ABNORMAL
EOSINOPHIL # BLD: 0.01 K/UL (ref 0.04–0.54)
EOSINOPHIL NFR BLD: 0.1 %
ERYTHROCYTE [DISTWIDTH] IN BLOOD BY AUTOMATED COUNT: 13.8 % (ref 11.6–14.4)
GFR SERPL CREATININE-BSD FRML MDRD: >60 ML/MIN/1.73M*2
GLUCOSE BLD-MCNC: 130 MG/DL (ref 70–99)
GLUCOSE BLD-MCNC: 192 MG/DL (ref 70–99)
GLUCOSE BLD-MCNC: 200 MG/DL (ref 70–99)
GLUCOSE BLD-MCNC: 219 MG/DL (ref 70–99)
GLUCOSE SERPL-MCNC: 143 MG/DL (ref 70–99)
HCT VFR BLDCO AUTO: 38.2 % (ref 40.1–51)
HGB BLD-MCNC: 12.2 G/DL (ref 13.7–17.5)
IMM GRANULOCYTES # BLD AUTO: 0.08 K/UL (ref 0–0.08)
IMM GRANULOCYTES NFR BLD AUTO: 0.6 %
LYMPHOCYTES # BLD: 0.59 K/UL (ref 1.2–3.5)
LYMPHOCYTES NFR BLD: 4.6 %
MCH RBC QN AUTO: 29.8 PG (ref 28–33.2)
MCHC RBC AUTO-ENTMCNC: 31.9 G/DL (ref 32.2–36.5)
MCV RBC AUTO: 93.2 FL (ref 83–98)
MONOCYTES # BLD: 0.45 K/UL (ref 0.3–1)
MONOCYTES NFR BLD: 3.5 %
NEUTROPHILS # BLD: 11.71 K/UL (ref 1.7–7)
NEUTS SEG NFR BLD: 91.1 %
NRBC BLD-RTO: 0 %
PDW BLD AUTO: 10.8 FL (ref 9.4–12.4)
PLATELET # BLD AUTO: 218 K/UL (ref 150–350)
POCT TEST: ABNORMAL
POTASSIUM SERPL-SCNC: 4.1 MEQ/L (ref 3.6–5.1)
PROT SERPL-MCNC: 5.7 G/DL (ref 6–8.2)
RBC # BLD AUTO: 4.1 M/UL (ref 4.5–5.8)
SODIUM SERPL-SCNC: 139 MEQ/L (ref 136–144)
WBC # BLD AUTO: 12.85 K/UL (ref 3.8–10.5)

## 2020-08-22 PROCEDURE — 12800001 HC ROOM AND CARE SEMIPRIVATE REHAB-BRAIN INJ

## 2020-08-22 PROCEDURE — 63600000 HC DRUGS/DETAIL CODE: Performed by: PHYSICAL MEDICINE & REHABILITATION

## 2020-08-22 PROCEDURE — 92522 EVALUATE SPEECH PRODUCTION: CPT | Mod: GN

## 2020-08-22 PROCEDURE — 97162 PT EVAL MOD COMPLEX 30 MIN: CPT | Mod: GP

## 2020-08-22 PROCEDURE — 92610 EVALUATE SWALLOWING FUNCTION: CPT | Mod: GN

## 2020-08-22 PROCEDURE — 85025 COMPLETE CBC W/AUTO DIFF WBC: CPT | Performed by: PHYSICAL MEDICINE & REHABILITATION

## 2020-08-22 PROCEDURE — 36415 COLL VENOUS BLD VENIPUNCTURE: CPT | Performed by: PHYSICAL MEDICINE & REHABILITATION

## 2020-08-22 PROCEDURE — 63700000 HC SELF-ADMINISTRABLE DRUG: Performed by: PHYSICAL MEDICINE & REHABILITATION

## 2020-08-22 PROCEDURE — 80053 COMPREHEN METABOLIC PANEL: CPT | Performed by: PHYSICAL MEDICINE & REHABILITATION

## 2020-08-22 PROCEDURE — 97167 OT EVAL HIGH COMPLEX 60 MIN: CPT | Mod: GO

## 2020-08-22 PROCEDURE — 97535 SELF CARE MNGMENT TRAINING: CPT | Mod: GO

## 2020-08-22 PROCEDURE — 63600000 HC DRUGS/DETAIL CODE: Performed by: STUDENT IN AN ORGANIZED HEALTH CARE EDUCATION/TRAINING PROGRAM

## 2020-08-22 RX ADMIN — LISINOPRIL 10 MG: 10 TABLET ORAL at 07:59

## 2020-08-22 RX ADMIN — HEPARIN SODIUM 5000 UNITS: 5000 INJECTION, SOLUTION INTRAVENOUS; SUBCUTANEOUS at 05:36

## 2020-08-22 RX ADMIN — DEXAMETHASONE 8 MG: 4 TABLET ORAL at 00:59

## 2020-08-22 RX ADMIN — LEVETIRACETAM 1000 MG: 500 TABLET ORAL at 07:59

## 2020-08-22 RX ADMIN — INSULIN ASPART 3 UNITS: 100 INJECTION, SOLUTION INTRAVENOUS; SUBCUTANEOUS at 12:17

## 2020-08-22 RX ADMIN — Medication 1 APPLICATION.: at 08:04

## 2020-08-22 RX ADMIN — HEPARIN SODIUM 5000 UNITS: 5000 INJECTION, SOLUTION INTRAVENOUS; SUBCUTANEOUS at 13:21

## 2020-08-22 RX ADMIN — INSULIN ASPART 3 UNITS: 100 INJECTION, SOLUTION INTRAVENOUS; SUBCUTANEOUS at 18:53

## 2020-08-22 RX ADMIN — HEPARIN SODIUM 5000 UNITS: 5000 INJECTION, SOLUTION INTRAVENOUS; SUBCUTANEOUS at 21:57

## 2020-08-22 RX ADMIN — INSULIN ASPART 3 UNITS: 100 INJECTION, SOLUTION INTRAVENOUS; SUBCUTANEOUS at 21:59

## 2020-08-22 RX ADMIN — DEXAMETHASONE 4 MG: 4 TABLET ORAL at 23:33

## 2020-08-22 RX ADMIN — DEXAMETHASONE 8 MG: 4 TABLET ORAL at 06:20

## 2020-08-22 RX ADMIN — DEXAMETHASONE 4 MG: 4 TABLET ORAL at 18:53

## 2020-08-22 RX ADMIN — ATORVASTATIN CALCIUM 10 MG: 10 TABLET, FILM COATED ORAL at 07:59

## 2020-08-22 RX ADMIN — LEVETIRACETAM 1000 MG: 500 TABLET ORAL at 21:57

## 2020-08-22 RX ADMIN — DEXAMETHASONE 8 MG: 4 TABLET ORAL at 11:05

## 2020-08-22 ASSESSMENT — COGNITIVE AND FUNCTIONAL STATUS - GENERAL: AFFECT: WFL

## 2020-08-22 NOTE — PROGRESS NOTES
Patient: Guy Jarrett  Location: [facility] [address]  MRN: [id number]  Today's date: 8/22/2020    History of Present Illness  Guy is a 70 y.o. male admitted on 8/21/2020 with Brain metastases (CMS/HCC).     Pt is a 71 y/o male admitted to [facility] on 8/21/20 s/p craniotomy and mass resection. Pt with known metastatic melanoma diagnosed in August 2019 receiving chemotherapy diagnosed with brain metastasis on 4/22/2020 in the right temporal lobe treated with gamma knife on 5/5/2020 found to have rapidly enlarging right frontal metastases on subsequent surveillance imaging.  He was admitted to [facility] on 8/18/2020.  Dr. Barrera from neurosurgery was consulted and performed craniotomy and mass resection.    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    SLP Pain    Date/Time Pain Type Pref Pain Scale Rating: Rest Rating: Rest [facility]   08/22/20 1000 Pain Assessment number (Numeric Rating Pain Scale) -- 0 - no pain SC   08/22/20 1017 -- -- 0 -- KM          Prior Living Environment      Most Recent Value   Lives With  spouse   Living Arrangements  house [2SH]   Living Environment Comment  Pt lives with wife Malka,   Location, Kitchen  first (main) floor   Kitchen Access Comment  wife performs cooking tasks   Location, Laundry Room  second floor, must negotiate stairs to access   Laundry Room Access Comment  wife performs laundry (pt reports he assists at times)   Location, Patient Bedroom  second floor, must negotiate stairs to access   Location, Bathroom  second floor, must negotiate stairs to access   Bathroom Access Comment  walk-in shower s GB, standard height toilet          Prior Level of Function      Most Recent Value   Dominant Hand  right   Ambulation  independent   Transferring  independent   Toileting  independent   Bathing   independent   Dressing  independent   Eating  independent   Prior Level of Function Comment  Independent PTA. (+) . Enjoys golf and household projects.   Assistive Device/Animal Currently Used at Home  none          IRF SLP Evaluation and Treatment - 08/22/20 1018        Time Calculation    Start Time  1000     Stop Time  1100     Time Calculation (min)  60 min        Session Details    Document Type  initial evaluation     Mode of Treatment  individual therapy;speech language pathology        General Information    Patient Profile Reviewed?  yes     General Observations of Patient  Pleasant, cooperative, no c/o pain         Cognition/Psychosocial    Affect/Mental Status (Cognitive)  WFL     Orientation Status (Cognition)  oriented x 4     Follows Commands (Cognition)  WFL     Cognitive Function (Cognitive)  WFL;other (see comments)    appears attention is pt baseline     Comment, Cognition  Pt can be tangential at times. possible baseline. AOx4 pt able to successfully complete all tasks for initial ST eval. If higher level cog-communication concerns arise can complete higher level cog-communication evaluation.         Orientation Log    [facility]  2-->logical cuing     Kind of Place  3-->spontaneous/free recall     Name of [facility]  3-->spontaneous/free recall     Month  3-->spontaneous/free recall     Date  3-->spontaneous/free recall     Year  3-->spontaneous/free recall     Day of Week  3-->spontaneous/free recall     Clock Time  3-->spontaneous/free recall     Etiology/Event  3-->spontaneous/free recall     Pathology Deficits  3-->spontaneous/free recall     Total Score  29     Comment  AOx4         Oral Motor    Dentition (Oral Motor)  natural dentition     Comment, Dentition (Oral Motor)  WFL         Facial Symmetry (Oral Motor)    Facial Symmetry (Oral Motor)  WNL     Comment, Facial Symmetry (Oral Motor)  WFL         Tongue Function (Oral Motor)    Tongue ROM (Oral Motor)  WNL     Tongue Strength (Oral  Motor)  WFL     Tongue Coordination/Speed (Oral Motor)  WNL     Comment, Tongue Function (Oral Motor)  WFL for speaking and swallowing         Cough/Swallow/Gag Reflex (Oral Motor)    Volitional Throat Clear/Cough (Oral Motor)  WNL     Comment, Cough/Swallow/Gag Reflex (Oral Motor)  WFL         Vocal Quality/Secretion Management (Oral Motor)    Vocal Quality (Oral Motor)  WNL     Secretion Management (Oral Motor)  WNL     Comment, Vocal Quality/Secretion Management (Oral Motor)  WFL        Motor Speech    Speech Intelligibility (Motor Speech)  WNL;conversational level;other (see comments)    pt speaks at rapid rate      Conversational Level, Speech Intelligibility (Motor Speech)  intact     Articulation (Motor Speech)  WNL     Speech Fluency (Motor Speech)  WNL     Vocal Loudness (Motor Speech)  WNL     Breath Support (Motor Speech)  intact     Rate/Prosody (Motor Speech)  WNL     Resonance (Motor Speech)  WNL     Comment, Motor Speech Assessment  100% intelligible at the conversational level         Auditory Comprehension    Follows Commands (Auditory Comprehension)  2-step commands     2 Step, Follows Commands (Auditory Comprehension)  intact     Yes/No Questions (Auditory Comprehension)  WFL;complex questions;simple/factual questions;biographical/personal questions     Complex Questions (Auditory Comprehension)  intact     Simple/Factual Questions (Auditory Comprehension)  intact;over 90% accuracy     Biographical/Personal Questions (Auditory Comprehension)  intact;over 90% accuracy     Paragraph Comprehension (Auditory Comprehension)  WFL     Comment, Assessment (Auditory Comprehension)  Auditory comprehension for complex language WFL. Pt self advocates if needs information repeated. Pt reports this as baseline. + for all tasks on initial evaluation.         Verbal Expression    Automatic Speech (Verbal Expression)  WFL     Confrontational Naming (Verbal Expression)  WNL     Responsive Naming (Verbal Expression)   WFL     Word Finding Skills (Verbal Expression)  WFL     Repetition Skills (Verbal Expression)  WFL     Narrative Speech (Verbal Expression)  WFL     Comment, Assesment (Verbal Expression)  Expressive language WFL -complex expressive language. Pt Narrative language WFL t/o initial evaluation.          Pragmatic Language    Nonverbal Skills (Pragmatic Language)  WNL        Reading Comprehension    Oral Reading Ability (Reading Comprehension)  WFL;paragraph level     Paragraph Level, Oral Reading Ability (Reading Comprehension)  intact     Comprehension Level (Reading Comprehension)  paragraph level tasks;WFL     Paragraph Level, Comprehension Level (Reading Comprehension)  intact     Comment, Assessment (Reading Comprehension)  [place] and basic reading tasks WFL         Written Language    Comment, Assessment (Written Language)  Pt able to write signature         General Swallowing Observations    Current Diet/Method of Nutritional Intake (General Swallowing Observations, NIS)  thin liquids;regular solids     Comment, General Swallowing Observations  Swallowing WNL-no overt s/sx aspiration. Pt tolerated trials regular solids/thin liquids.No overt s/sx aspiration noted.         Food and Liquid Trials (NIS)    Patient Positioning  upright in wheelchair     Oral Intake/Feeding Performance  independent/appropriate self-feeding skills     Food Consistencies Evaluated  regular solids     Regular Solids  WFL;patient controlled amounts     Comment, Regular Solids  WFL      Liquid Consistencies Evaluated  thin liquids     Thin Liquids  WFL     Comment, Thin Liquids  WFL no overt s/sx aspiration of thin liquids      Oral Preparatory Phase of Swallow  WFL     Oral Phase of Swallow  WFL     Pharyngeal Phase of Swallow  no clinical symptoms     Comment  Pt tolerating present diet level of regular solids/thin liquids. No overt s/sx aspiration. Dysphagia therapy not recommended.         Swallowing Recommendations     Recommended Feeding/Eating Techniques  maintain upright posture during/after eating for 30 mins     Comment, Swallowing Recommendations  Present diet level-Regular solids/thin liquids.         Nutrition/Eating/Swallowing    Signs/Symptoms of Aspiration (Current Diet)  none        Patient/Family Goals (SLP)    Patient's Goals For Discharge  return home;other (see comments)    to do everything again        Therapy Assessment/Plan (SLP)    SLP Diagnosis  Pt cog-communication skills WFL in all areas at IE. Pt is very engaging. Talkative nature appears to be pt baseline. Pt speaks very quickly and may appear to be distracted but successfully completed all tasks.      Swallowing Diagnosis  swallowing is WNL     Therapy Frequency (SLP)  other (see comments)    ST not recommended upon completition of IE.    Comment, Therapy Assessment/Plan (SLP)  ST not recommended after completition of  initial ST evaluation. Upon evaluation, all areas cog-communication skills WFL.         Daily Progress Summary (SLP)    Daily Outcome Statement (SLP)  Formal ST not recommended. Pt performance WFL for all areas of cog-communication skills upon initial evaluation. If higher level cog-communication concerns arise please refer for additional ST evaluation.      Recommendations  ST not recommended.                   Education provided this session. See the Patient Education summary report for full details.

## 2020-08-22 NOTE — PLAN OF CARE
Problem: Rehabilitation (IRF) Plan of Care  Goal: Plan of Care Review  Outcome: Progressing  Flowsheets (Taken 8/22/2020 0634)  Plan of Care Reviewed With: patient  IRF Plan of Care Review: progress ongoing, continue  Outcome Summary: pt slept well overnight. denies pain/discomfort. continent of bladder.PVR 0. rang call bell appropriately. no attemts to get OOB without assist

## 2020-08-22 NOTE — PLAN OF CARE
Problem: Rehabilitation (IRF) Plan of Care  Goal: Plan of Care Review  Outcome: Progressing  Flowsheets (Taken 8/22/2020 9494)  Plan of Care Reviewed With: patient  Outcome Summary: patient A&Ox3. pt tolerated thearpy well. patient denies any pain.

## 2020-08-22 NOTE — PROGRESS NOTES
Patient: Guy Jarrett  Location: [facility] [address]  MRN: [id number]  Today's date: 8/22/2020    History of Present Illness  Guy is a 70 y.o. male admitted on 8/21/2020 with Brain metastases (CMS/HCC).     Pt is a 69 y/o male admitted to [facility] on 8/21/20 s/p craniotomy and mass resection. Pt with known metastatic melanoma diagnosed in August 2019 receiving chemotherapy diagnosed with brain metastasis on 4/22/2020 in the right temporal lobe treated with gamma knife on 5/5/2020 found to have rapidly enlarging right frontal metastases on subsequent surveillance imaging.  He was admitted to [facility] on 8/18/2020.  Dr. Barrera from neurosurgery was consulted and performed craniotomy and mass resection.    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    PT Vitals    Date/Time Pulse BP BP Location BP Method Pt Position [facility]   08/22/20 1112 57 155/69 Left upper arm Automatic Sitting LB   08/22/20 1149 55 129/71 Left upper arm Automatic Sitting LB      PT Pain    Date/Time Rating: Rest Rating: Rest [facility]   08/22/20 1112 -- 0 - no pain LB   08/22/20 1149 -- 0 - no pain LB          Prior Living Environment      Most Recent Value   Lives With  spouse   Living Arrangements  house [2SH]   Living Environment Comment  Pt lives with wife Malka,   Location, Kitchen  first (main) floor   Kitchen Access Comment  wife performs cooking tasks   Location, Laundry Room  second floor, must negotiate stairs to access   Laundry Room Access Comment  wife performs laundry (pt reports he assists at times)   Location, Patient Bedroom  second floor, must negotiate stairs to access   Location, Bathroom  second floor, must negotiate stairs to access   Bathroom Access Comment  walk-in shower s GB, standard height toilet          Prior Level of Function      Most  Recent Value   Dominant Hand  right   Ambulation  independent   Transferring  independent   Toileting  independent   Bathing  independent   Dressing  independent   Eating  independent   Prior Level of Function Comment  Independent PTA. (+) . Enjoys golf and household projects.   Assistive Device/Animal Currently Used at Home  none          IRF PT Evaluation and Treatment - 08/22/20 1100        Time Calculation    Start Time  1100     Stop Time  1200     Time Calculation (min)  60 min        Session Details    Document Type  initial evaluation     Mode of Treatment  physical therapy;individual therapy        General Information    Patient Profile Reviewed?  yes     General Observations of Patient  Pleasant, cooperative, motivated     Existing Precautions/Restrictions  fall        Cognition/Psychosocial    Comment, Cognition  A&O x4. Able to follow all instructions for mobility assessments.        Sensory Assessment (Somatosensory)    Sensory Assessment (Somatosensory)  left LE;right LE     Left LE Sensory Assessment  light touch awareness;light touch localization;proprioception;intact    proprioception intact at great toe    Right LE Sensory Assessment  light touch awareness;light touch localization;proprioception;intact    proprioception intact at great toe    Sensory Subjective Reports  other (see comments)    denies sensory changes in BE       Range of Motion (ROM)    Range of Motion  left lower extremity ROM deficit;right lower extremity ROM deficit     Left Lower Extremity (ROM)  left LE ROM is WFL except;ankle     Ankle, Left (ROM)  L ankle DF to neutral with knee extended in supine     Right Lower Extremity (ROM)  right LE ROM is WFL        Strength (Manual Muscle Testing)    Strength (Manual Muscle Testing)  left lower extremity strength deficit;right lower extremity strength deficit     Left Lower Extremity Strength  left LE strength is WFL except;hip;knee;ankle     Hip, Left (Strength)  L hip ER 2+/5,  ABD 2+/5,      Knee, Left (Strength)  L knee flexion 1/5, ext 4/5     Ankle, Left (Strength)  L ankle DF 1/5     Right Lower Extremity Strength  right LE strength is WFL        Bed Mobility    [place], Roll Left  close supervision     [place], Roll Right  close supervision     [place], Supine to Sit  close supervision     [place], Sit to Supine  close supervision     Assistive Device (Bed Mobility)  none        Transfers    Transfers  car transfer        Bed to Chair Transfer    [place], Bed to Chair  moderate assist (50-74% patient effort);verbal cues     Verbal Cues  hand placement;safety;technique     Comment  Min/Mod A SPT without AD        Chair to Bed Transfer    [place], Chair to Bed  moderate assist (50-74% patient effort);verbal cues     Verbal Cues  hand placement;safety;technique     Comment  Min/Mod A SPT without AD        Sit to Stand Transfer    [place], Sit to Stand Transfer  minimum assist (75% or more patient effort);verbal cues     Verbal Cues  hand placement;safety     Assistive Device  none        Stand to Sit Transfer    [place], Stand to Sit Transfer  minimum assist (75% or more patient effort);verbal cues     Verbal Cues  hand placement;safety     Assistive Device  none        Car Transfer    [place], Car Transfer  not tested     Comment  Unsafe at this time d/t impaired balance, LLE hemiparesis        Gait Training    [place], Gait  dependent (less than 25% patient effort);2 person assist     Assistive Device  none     Distance in Feet  30 feet     Deviations/Abnormal Patterns (Gait)  gait speed decreased;stride length decreased;left sided deviations     Left Sided Gait Deviations  foot drop/toe drag;heel strike decreased    decreased TKE in stance    Advanced Gait Activity  curb negotiation;sloped surfaces     [place], Picking Up Object  not tested    unsafe at this time    Comment  LUE arm-over assist (overall Mod A for balance and WS)  "+ 2nd person W/C follow for safety. Additional gait trial with RW 30' with Min A.         Curb Negotiation (Mobility)    [place]  dependent (less than 25% patient effort);2 person assist     Comment  up/down 6+2\" curb with LUE over PT, overall Mod A + 2nd person Cl S for safety        Sloped Surface Gait Skills (Mobility)    [place]  dependent (less than 25% patient effort);2 person assist     Comment  up/down 5' ramp with LUE over PT, overall Mod A + 2nd person Cl S for safety        Stairs Training    [place], Stairs  moderate assist (50-74% patient effort);verbal cues     Assistive Device  railing     Handrail Location  both sides     Number of Stairs  4     Stair Height  6 inches     Ascending Stairs Technique  step-to-step    RLE leading    Descending Stairs Technique  step-to-step    LLE leading    Comment  VC for safety/sequencing        Wheelchair Mobility/Management    Wheelchair Type  manual, lightweight     Functional Mobility Training  forward propulsion     [place], Forward Propulsion  dependent (less than 25% patient effort)     Distance Propelled in Feet  0 feet     Comment, Functional Mobility  Patient utilizing W/C for transportation purposes within hospital setting. Do not anticipate need for W/C for locomotion upon D/C from BMR.        Balance    Balance Assessment  sitting static balance;sitting dynamic balance;standing static balance;standing dynamic balance     Static Sitting Balance  WFL     Dynamic Sitting Balance  mild impairment     Static Standing Balance  mild impairment     Dynamic Standing Balance  severe impairment        Motor Skills    Motor Skills  coordination;functional endurance;muscle tone;postural deviations     Coordination  right;WFL;left;lower extremity;gross motor deficit;moderate impairment    limited by hemiparesis    Functional Endurance  Fair     Muscle Tone  left;lower extremity(s);hypertonia;mild impairment    increased tone L hamstring       " Postural Deviations    Postural Deviations  head and neck;shoulder;upper back;pelvis     Head and Neck  forward head     Shoulder  left shoulder forward;right shoulder forward     Upper Back  abducted scapulae     Pelvis  posterior pelvic tilt     Comment, Postural Deviations  mild deviations as described above        Gait/Walking Locomotion Goal 1    Distance  150 feet        Gait/Walking Locomotion Goal 2    Distance  200 feet        Patient/Family Goals    Patient's Goals For Discharge  other (see comments)    improve LLE strength       Therapy Assessment/Plan (PT)    Rehab Potential/Prognosis (PT)  good, to achieve stated therapy goals     Frequency of Treatment (PT)  60-90 minutes per day;5-7 times per week     Estimated Duration of Therapy/Length of Stay (PT)  3 weeks     Problem List  problems related to;balance;coordination;mobility;strength     Activity Limitations Related to Problem List  unable to ambulate safely;unable to transfer safely     Planned Therapy Interventions (PT)  balance training;bed mobility training;gait training;motor coordination training;neuromuscular re-education;orthotic fitting/training;patient/family education;stair training;strengthening;stretching;transfer training     Comment, Therapy Assessment/Plan (PT)  Patient is a 71 y/o previously-independent male admitted to [facility] s/p craniotomy and mass resection. Patient with known metastatic melanoma diagnosed in August 2019 receiving chemotherapy diagnosed with brain metastasis on 4/22/2020 in the right temporal lobe treated with gamma knife on 5/5/2020 found to have rapidly enlarging right frontal metastases on subsequent imaging.  He was admitted to [facility] on 8/18/2020 for craniotomy and mass resection. At the time of this evaluation, the patient is functioning below his baseline. The patient currently requires Cl S for bed mobility, Min/Mod A for transfers, assist of 2 for ambulation, Mod A for stairs, and assist of 2 for curb/ramp  negotiation. He is most limited by LLE hemiparesis. The patient will continue to benefit from skilled I/P PT in order to address above-stated deficits, to ensure the highest level of safety and independence upon discharge. DONTE pending team discussion.             Education provided this session. See the Patient Education summary report for full details.    IRF PT Goals      Most Recent Value   Bed Mobility Goal 1   Activity/Assistive Device  sit to supine/supine to sit, rolling to left, rolling to right at 08/22/2020 1100   [place]  supervision required at 08/22/2020 1100   Time Frame  short-term goal (STG), 1 week at 08/22/2020 1100   Bed Mobility Goal 2   Activity/Assistive Device  sit to supine/supine to sit, rolling to left, rolling to right at 08/22/2020 1100   [place]  modified independence at 08/22/2020 1100   Time Frame  long-term goal (LTG), 21 days or less at 08/22/2020 1100   Transfer Goal 1   Activity/Assistive Device  sit-to-stand/stand-to-sit, bed-to-chair/chair-to-bed, stand pivot at 08/22/2020 1100   [place]  minimum assist (75% or more patient effort), verbal cues required at 08/22/2020 1100   Time Frame  short-term goal (STG), 1 week at 08/22/2020 1100   Transfer Goal 2   Activity/Assistive Device  sit-to-stand/stand-to-sit, bed-to-chair/chair-to-bed, car transfer, stand pivot at 08/22/2020 1100   [place]  modified independence at 08/22/2020 1100   Time Frame  long-term goal (LTG), 21 days or less at 08/22/2020 1100   Gait/Walking Locomotion Goal 1   Activity/Assistive Device  gait (walking locomotion) at 08/22/2020 1100   Distance  150 feet at 08/22/2020 1100   [place]  minimum assist (75% or more patient effort), verbal cues required at 08/22/2020 1100   Time Frame  short-term goal (STG), 1 week at 08/22/2020 1100   Gait/Walking Locomotion Goal 2   Activity/Assistive Device  gait (walking locomotion) at 08/22/2020 1100   Distance  200 feet at 08/22/2020 1100    [place]  modified independence at 08/22/2020 1100   Time Frame  long-term goal (LTG), 21 days or less at 08/22/2020 1100   Stairs Goal 1   Activity/Assistive Device  ascending stairs, descending stairs, using handrail, right, step-to-step at 08/22/2020 1100   Number of Stairs  8 at 08/22/2020 1100   [place]  minimum assist (75% or more patient effort), verbal cues required at 08/22/2020 1100   Time Frame  short-term goal (STG), 1 week at 08/22/2020 1100   Stairs Goal 2   Activity/Assistive Device  ascending stairs, descending stairs, using handrail, right, step-to-step at 08/22/2020 1100   Number of Stairs  12 at 08/22/2020 1100   [place]  modified independence at 08/22/2020 1100   Time Frame  long-term goal (LTG), 21 days or less at 08/22/2020 1100

## 2020-08-22 NOTE — PROGRESS NOTES
Patient: Guy Jarrett  [facility]  MRN: [id number]  Today's date: 8/22/2020    No notes on file    OT Vitals    Date/Time Pulse HR Source SpO2 Pt Activity O2 Therapy BP BP Location BP Method Pt Position [facility]   08/22/20 0800 56 Monitor 97 % At rest None (Room air) 141/72 Left upper arm Automatic Lying RSC      OT Pain    Date/Time Pain Type Pref Pain Scale Rating: Rest Rating: Activity [facility]   08/22/20 0759 Post Activity word (verbal rating pain scale) 0 - no pain 0 - no pain DT          Prior Living Environment      Most Recent Value   Lives With  spouse   Living Arrangements  house [2SH]   Living Environment Comment  Pt lives with wife Malka,   Location, Kitchen  first (main) floor   Kitchen Access Comment  wife performs cooking tasks   Location, Laundry Room  second floor, must negotiate stairs to access   Laundry Room Access Comment  wife performs laundry (pt reports he assists at times)   Location, Patient Bedroom  second floor, must negotiate stairs to access   Location, Bathroom  second floor, must negotiate stairs to access   Bathroom Access Comment  walk-in shower s GB, standard height toilet          Prior Level of Function      Most Recent Value   Dominant Hand  right   Ambulation  independent   Transferring  independent   Toileting  independent   Bathing  independent   Dressing  independent   Eating  independent   Assistive Device/Animal Currently Used at Home  none          IRF OT Evaluation and Treatment - 08/22/20 0745        Time Calculation    Start Time  0745     Stop Time  0800     Time Calculation (min)  15 min        Session Details    Document Type  daily treatment/progress note     Mode of Treatment  individual therapy;occupational therapy        Bed Mobility    [place], Supine to Sit  minimum assist (75% or more patient effort)        Bed to Chair Transfer    [place], Bed to Chair  moderate assist (50-74% patient effort)     Assistive Device   other (see comments)    none    Comment  SPT, blocking L knee        Sit to Stand Transfer    [place], Sit to Stand Transfer  moderate assist (50-74% patient effort)     Assistive Device  none        Stand to Sit Transfer    [place], Stand to Sit Transfer  moderate assist (50-74% patient effort)     Assistive Device  none        Toilet Transfer    Comment  declined        Shower Transfer    Transfer Technique  stand pivot     [place], Shower Transfer  moderate assist (50-74% patient effort)     Assistive Device  shower chair;grab bars/tub rail     Comment  SPT into barrier free shower        Daily Progress Summary (OT)    Daily Outcome Statement (OT)  Functional status from IE.                             IRF OT Goals      Most Recent Value   Transfer Goal 1   Activity/Assistive Device  toilet at 08/22/2020 0704   [place]  moderate assist (50-74% patient effort) at 08/22/2020 0704   Time Frame  short-term goal (STG), 1 week at 08/22/2020 0704   Transfer Goal 2   Activity/Assistive Device  toilet at 08/22/2020 0704   [place]  modified independence at 08/22/2020 0704   Time Frame  long-term goal (LTG), 4 weeks at 08/22/2020 0704   Transfer Goal 3   Activity/Assistive Device  shower at 08/22/2020 0704   [place]  minimum assist (75% or more patient effort) at 08/22/2020 0704   Time Frame  short-term goal (STG), 1 week at 08/22/2020 0704   Transfer Goal 4   Activity/Assistive Device  shower at 08/22/2020 0704   [place]  modified independence at 08/22/2020 0704   Time Frame  long-term goal (LTG), 4 weeks at 08/22/2020 0704   Bathing Goal 1   Activity/Assistive Device  bathing skills, all at 08/22/2020 0704   [place]  other (see comments) [touching/steadying assist] at 08/22/2020 0704   Time Frame  short-term goal (STG), 1 week at 08/22/2020 0704   Bathing Goal 2   Activity/Assistive Device  bathing skills, all at 08/22/2020 0704   [place]  modified independence at 08/22/2020  0704   Time Frame  long-term goal (LTG), 4 weeks at 08/22/2020 0704   UB Dressing Goal 1   Activity/Assistive Device  upper body dressing at 08/22/2020 0704   [place]  minimum assist (75% or more patient effort) at 08/22/2020 0704   Time Frame  short-term goal (STG), 1 week at 08/22/2020 0704   UB Dressing Goal 2   Activity/Assistive Device  upper body dressing at 08/22/2020 0704   [place]  modified independence at 08/22/2020 0704   Time Frame  long-term goal (LTG), 4 weeks at 08/22/2020 0704   LB Dressing Goal 1   Activity/Assistive Device  lower body dressing at 08/22/2020 0704   [place]  moderate assist (50-74% patient effort) at 08/22/2020 0704   Time Frame  short-term goal (STG), 1 week at 08/22/2020 0704   LB Dressing Goal 2   Activity/Assistive Device  lower body dressing at 08/22/2020 0704   [place]  modified independence at 08/22/2020 0704   Time Frame  long-term goal (LTG), 4 weeks at 08/22/2020 0704   Grooming Goal 1   Activity/Assistive Device  grooming skills, all at 08/22/2020 0704   [place]  minimum assist (75% or more patient effort) at 08/22/2020 0704   Time Frame  short-term goal (STG), 1 week at 08/22/2020 0704   Grooming Goal 2   Activity/Assistive Device  grooming skills, all at 08/22/2020 0704   [place]  modified independence at 08/22/2020 0704   Time Frame  long-term goal (LTG), 4 weeks at 08/22/2020 0704   Toileting Goal 1   Activity/Assistive Device  toileting skills, all at 08/22/2020 0704   [place]  minimum assist (75% or more patient effort) at 08/22/2020 0704   Time Frame  short-term goal (STG), 1 week at 08/22/2020 0704   Toileting Goal 2   Activity/Assistive Device  toileting skills, all at 08/22/2020 0704   [place]  modified independence at 08/22/2020 0704   Time Frame  long-term goal (LTG), 4 weeks at 08/22/2020 0704

## 2020-08-22 NOTE — PLAN OF CARE
Problem: Rehabilitation (IRF) Plan of Care  Goal: Plan of Care Review  Flowsheets (Taken 8/22/2020 6282)  Plan of Care Reviewed With: patient  IRF Plan of Care Review: progress ongoing, continue  Outcome Summary: PT evaluation completed. RW issued for use in room with nursing staff.

## 2020-08-22 NOTE — CONSULTS
"  201/201D      Clinical Course: Patient is a 70 y.o. male who was admitted on 8/21/2020 with a diagnosis of Brain metastases (CMS/HCC) [C79.31].     Nutrition Interventions/Recommendations:   1. Con't current diet  2. 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    Adult Nutrition Assessment - 08/22/20 1200        Charting Type    Nutrition Charting Type  Nutrition Brief Assessment     Nutrition Status Classification  Mild nutritional compromise     Time Spent (Minutes)  45        Reason for Assessment    Reason For Assessment  identified at risk by screening criteria     Identified At Risk by Screening Criteria  other (see comments)    MST       Nutrition/Diet History    Diet Prior to Admission  regular/thin      Appetite Prior to Admission  Lwzsfpyxu-%     Intake (%)  100%      Strength  --    self-feeder    Food Allergies  --    NKFA       Anthropometrics    Height  1.753 m (5' 9.02\")        Current Weight    Weight Method  Bed scale     Weight  75.8 kg (167 lb 1.7 oz)        Ideal Body Weight (IBW)    Ideal Body Weight (IBW) (kg)  73.73     % Ideal Body Weight  102.81        Usual Body Weight (UBW)    Usual Body Weight  74.8 kg (165 lb)    stated    % Usual Body Weight  101.28        Body Mass Index (BMI)    BMI (Calculated)  24.7     BMI (kg/m2)  24.72     BMI Assessment  BMI 18.5-24.9: normal        Labs/Procedures/Meds    Lab Results Reviewed  reviewed, pertinent     Lab Results Comments  glu 143, bun 41        Medications    Pertinent Medications Reviewed  reviewed, pertinent     Pertinent Medications Comments  decadron, novolog        Physical Findings    Overall Physical Appearance  --    well nourished    Last Bowel Movement  08/20/20     " Oral/Mouth Cavity  --    teeth WDL    Skin  surgical incision    head incision       Gooding-St. Jeor Equation    RMR (Gooding-St. Jeor Equation)  1513.63        Nutrition Order    Nutrition Order Review  meets nutritional requirements     Nutrition Order Comments  NCS/thin         PES Statement    Nutrition Diagnosis  Altered Nutrition-Related Laboratory Values     Related To:  Other (Comments)    steroid-induced     As Evidenced By:  Hyperglycemia     Nutritional Needs Met?  Yes           CMP Results       08/22/20 08/20/20                       0533            140          K 4.1 4.5          Cl 106 106          CO2 24 25          Glucose 143 130          BUN 41 26          Creatinine 1.0 1.0          Calcium 8.6 --          Anion Gap 9 --          AST 24 --          ALT 38 --          Albumin 3.3 --          EGFR >60.0 --                       Lab Results   Component Value Date    ALT 38 08/22/2020    AST 24 08/22/2020    ALKPHOS 81 08/22/2020    BILITOT 0.5 08/22/2020     No results found for: [id number]  Lab Results   Component Value Date    WBC 12.85 (H) 08/22/2020    HGB 12.2 (L) 08/22/2020    HCT 38.2 (L) 08/22/2020    MCV 93.2 08/22/2020     08/22/2020     Lab Results   Component Value Date    CALCIUM 8.6 (L) 08/22/2020     Glucose Results     No lab values to display.            • atorvastatin  10 mg oral Daily   • dexAMETHasone  4 mg oral q6h    Followed by   • [START ON 8/25/2020] dexAMETHasone  4 mg oral q12h MALGORZATA    Followed by   • [START ON 8/30/2020] dexAMETHasone  2 mg oral q12h MALGORZATA   • [START ON 9/3/2020] dexAMETHasone  2 mg oral Daily   • heparin (porcine)  5,000 Units subcutaneous q8h MALGORZATA   • insulin aspart U-100  3-5 Units subcutaneous With meals & nightly   • levETIRAcetam  1,000 mg oral BID   • lisinopriL  10 mg oral Daily   • zinc oxide-cod liver oil  1 application Topical Daily              Dietary Orders   (From admission, onward)             Start     Ordered    08/21/20 8549   Adult Diet Regular; Thin Liquids; No Concentrated Sweets  Diet effective now     Question Answer Comment   Diet Texture Regular    Fluid Consistency: Thin Liquids    Other Restriction(s): No Concentrated Sweets        08/21/20 1804              Wt Readings from Last 3 Encounters:   08/22/20 75.8 kg (167 lb 1.7 oz)   08/21/20 74.8 kg (165 lb)       Weights (last 7 days)     Date/Time   Weight    08/22/20 1200   75.8 kg (167 lb 1.7 oz)    08/21/20 1812   75.8 kg (167 lb 3.2 oz)              Clinical Comments:    Excellent po intake. Hyperglycemia due to steroid meds.       Date: 08/22/20  Signature: ROSA Beltran

## 2020-08-22 NOTE — PROGRESS NOTES
08/22/20 1100   General Information   Preferred Language English   Referring Facility Type acute care facility   Contact Information   Permission Granted to Share Info With family/designee   Advance Directives (for Healthcare)   Does patient have advance directive? Yes   Patient has Advance Directive Patient has Advance Directive, has not brought in  (pt does not want to bring in)   Living Environment   Lives With spouse   Current Living Arrangements home/apartment/condo   Provides Primary Care For no one, unable/limited ability to care for self   Quality of Family Relationships involved;supportive;stressful   Able to Return to Prior Arrangements yes   Living Arrangement Comments 2SH, 1/2 bath 1st fl, 1STE from Garage and 2 ADELE at front door   Employment/   Employment Status retired   Current or Previous Occupation professional   Employment/ Comments Nuclear/electrical engineering   Financial/Legal   Source of Income social security;pension/detention   Discharge Needs Assessment (IRF)   Concerns to be Addressed care coordination/care conferences;adjustment to diagnosis/illness;discharge planning   Patient/Family Anticipates Transition to home with family   Transportation Concerns car, none   Transportation Anticipated family or friend will provide   Anticipated Discharge Disposition home with assistance   Does the patient need discharge transport arranged? No   Team Conference   Next Team Conference Date 08/26/20   Pts wife is retired and able to support pt at home.    Reviewed window visits and both pt/wife decline at present.  Following.

## 2020-08-22 NOTE — PROGRESS NOTES
Patient: Guy Jarrett  [facility]  MRN: [id number]  Today's date: 8/22/2020    No notes on file    OT Vitals    Date/Time Pulse HR Source SpO2 Pt Activity O2 Therapy BP BP Location BP Method Pt Position [facility]   08/22/20 0715 50 Monitor -- -- -- 141/72 Left upper arm Automatic Lying DT   08/22/20 0800 56 Monitor 97 % At rest None (Room air) 141/72 Left upper arm Automatic Lying RSC      OT Pain    Date/Time Pain Type Pref Pain Scale Rating: Rest Rating: Activity [facility]   08/22/20 0715 Pain Assessment word (verbal rating pain scale) 0 - no pain 0 - no pain DT   08/22/20 0759 Post Activity word (verbal rating pain scale) 0 - no pain 0 - no pain DT          Prior Living Environment      Most Recent Value   Lives With  spouse   Living Arrangements  house [2SH]   Living Environment Comment  Pt lives with wife Malka,   Location, Kitchen  first (main) floor   Kitchen Access Comment  wife performs cooking tasks   Location, Laundry Room  second floor, must negotiate stairs to access   Laundry Room Access Comment  wife performs laundry (pt reports he assists at times)   Location, Patient Bedroom  second floor, must negotiate stairs to access   Location, Bathroom  second floor, must negotiate stairs to access   Bathroom Access Comment  walk-in shower s GB, standard height toilet          Prior Level of Function      Most Recent Value   Dominant Hand  right   Ambulation  independent   Transferring  independent   Toileting  independent   Bathing  independent   Dressing  independent   Eating  independent   Assistive Device/Animal Currently Used at Home  none          IRF OT Evaluation and Treatment - 08/22/20 0704        Time Calculation    Start Time  0700     Stop Time  0745     Time Calculation (min)  45 min        Session Details    Document Type  initial evaluation     Mode of Treatment  individual therapy;occupational therapy        General Information    Patient Profile Reviewed?   "yes     Existing Precautions/Restrictions  fall;seizures;cardiac        Occupational Profile    Occupational History/Life Experiences (Occupational Profile)  +retired , wife acts as a thu for grandchildren but since COVID she has been home, wife is in good health, has 3 children (one is 15 minutes away another is an hour, last is 6 hours away). Leisure: golf, home projects. +      Patient Goals (Occupational Profile)  \"to be back 100%\" \"get my left foot working\"        Cognition/Psychosocial    Comment, Cognitive Interventions  BIMS: pt orient to month, year, and day of week, immediate recall 3/3, delayed recall 2/3. Pt making inappropriate comments at times, easily redirected.         Vision Assessment/Intervention    Impact of Vision Impairment on Function  last optometrist apt was ~1 month ago. reports having \"mono-vision\" has corrective lenses but reports he does not utilize them \"I cant get away without them\", reports no visual changes since surgery; recommend formal assessment        Sensory Assessment (Somatosensory)    Sensory Subjective Reports  other (see comments)    denies, recommend formal assessment       Range of Motion (ROM)    Range of Motion  ROM is WFL        Range of Motion Comprehensive    Comment: Range of Motion  herniated disc C7-T1 occurred early Feb 2020 but due to COVID he stopped. \"[facility]\" in [place], noted tightness in L ankle        Strength Comprehensive (MMT)    Comment  3/5 LUE shoulder flexion, 4+/5 RUE shoulder flxion, recommend formal assessment - noted atrophy in R hand         Transfers    Transfers  shower transfer;toilet transfer        Toilet Transfer    [place], Toilet Transfer  unable to assess     Comment  required DME to perform safely        Shower Transfer    [place], Shower Transfer  unable to assess     Comment  required DME to perform safely        Balance    Comment, Balance  Pt required CLS to maintain balance seated " EOB with BUE support. Pt required MIN-MOD A to maintain balance in stand        Motor Skills    Motor Skills  --    recommend formal assessment       Bathing    Self-Performance  chest;left arm;right arm;abdomen;front perineal area;left upper leg;buttocks;right upper leg;left lower leg, including foot;right lower leg, including foot     [place] Assistance  left lower leg, including foot;right lower leg, including foot     [place]  minimum assist (75% or more patient effort)     Position  supported sitting;supported standing     Setup Assistance  obtain supplies;adjust water temperature/flow;adaptive equipment setup;open containers     Adaptive Equipment  grab bar/tub rail;hand-held shower spray hose;shower chair        Upper Body Dressing    Self-Performance  don;obtains clothes;threads left arm, shirt;threads right arm, shirt;pulls shirt over head/around back;pulls shirt down/adjusts     [place] Assistance  threads left arm, shirt;pulls shirt down/adjusts     [place]  moderate assist (50-74% patient effort)     Position  supported sitting     Adaptive Equipment  none        Lower Body Dressing    Self-Performance  don     [place] Assistance  obtains clothes;threads left leg, underpants;threads right leg, underpants;pulls underpants up or down;threads left leg, pants/shorts;threads right leg, pants/shorts;pulls pants/shorts up or down;dons/doffs left sock;dons/doffs right sock     [place]  dependent (less than 25% patient effort)     Position  supported sitting;supported standing;unsupported sitting;unsupported standing     Adaptive Equipment  none     [place], Footwear  dependent (less than 25% patient effort)        Grooming    Self-Performance  washes, rinses and dries face     [place]  close supervision     Position  unsupported sitting     Comment  declined oral care        Therapy Assessment/Plan (OT)    Rehab Potential/Prognosis (OT)  good, to achieve stated therapy goals     Frequency  of Treatment (OT)  5-7 times per week;60-90 minutes per day     Estimated Length of Stay  4 weeks     Problem List (OT)  problems related to;balance;cognition;coordination;mobility;motor control;strength     Activity Limitations Related to Problem List  unable to ambulate safely;unable to transfer safely;BADLs not performed adequately or safely;IADLs not performed adequately or safely;community activities not performed adequately or safely;home management activity not performed adequately or safely     Planned Therapy Interventions (OT)  activity tolerance training;adaptive equipment training;BADL retraining;cognitive/visual perception retraining;functional balance retraining;IADL retraining;neuromuscular control/coordination retraining;occupation/activity based interventions;patient/caregiver education/training;ROM/therapeutic exercise;strengthening exercise;transfer/mobility retraining     Comment, Therapy Assessment/Plan (OT)  Pt is a 69 y/o male admitted to [facility] on 8/21/20 s/p craniotomy and mass resection. Pt with known metastatic melanoma diagnosed in August 2019 receiving chemotherapy diagnosed with brain metastasis on 4/22/2020 in the right temporal lobe treated with gamma knife on 5/5/2020 found to have rapidly enlarging right frontal metastases on subsequent surveillance imaging.  He was admitted to [facility] on 8/18/2020.  Dr. Barrera from neurosurgery was consulted and performed craniotomy and mass resection.                       Education provided this session. See the Patient Education summary report for full details.    IRF OT Goals      Most Recent Value   Transfer Goal 1   Activity/Assistive Device  toilet at 08/22/2020 0704   [place]  moderate assist (50-74% patient effort) at 08/22/2020 0704   Time Frame  short-term goal (STG), 1 week at 08/22/2020 0704   Transfer Goal 2   Activity/Assistive Device  toilet at 08/22/2020 0704   [place]  modified independence at 08/22/2020 0704   Time Frame   long-term goal (LTG), 4 weeks at 08/22/2020 0704   Transfer Goal 3   Activity/Assistive Device  shower at 08/22/2020 0704   [place]  minimum assist (75% or more patient effort) at 08/22/2020 0704   Time Frame  short-term goal (STG), 1 week at 08/22/2020 0704   Transfer Goal 4   Activity/Assistive Device  shower at 08/22/2020 0704   [place]  modified independence at 08/22/2020 0704   Time Frame  long-term goal (LTG), 4 weeks at 08/22/2020 0704   Bathing Goal 1   Activity/Assistive Device  bathing skills, all at 08/22/2020 0704   [place]  other (see comments) [touching/steadying assist] at 08/22/2020 0704   Time Frame  short-term goal (STG), 1 week at 08/22/2020 0704   Bathing Goal 2   Activity/Assistive Device  bathing skills, all at 08/22/2020 0704   [place]  modified independence at 08/22/2020 0704   Time Frame  long-term goal (LTG), 4 weeks at 08/22/2020 0704   UB Dressing Goal 1   Activity/Assistive Device  upper body dressing at 08/22/2020 0704   [place]  minimum assist (75% or more patient effort) at 08/22/2020 0704   Time Frame  short-term goal (STG), 1 week at 08/22/2020 0704   UB Dressing Goal 2   Activity/Assistive Device  upper body dressing at 08/22/2020 0704   [place]  modified independence at 08/22/2020 0704   Time Frame  long-term goal (LTG), 4 weeks at 08/22/2020 0704   LB Dressing Goal 1   Activity/Assistive Device  lower body dressing at 08/22/2020 0704   [place]  moderate assist (50-74% patient effort) at 08/22/2020 0704   Time Frame  short-term goal (STG), 1 week at 08/22/2020 0704   LB Dressing Goal 2   Activity/Assistive Device  lower body dressing at 08/22/2020 0704   [place]  modified independence at 08/22/2020 0704   Time Frame  long-term goal (LTG), 4 weeks at 08/22/2020 0704   Grooming Goal 1   Activity/Assistive Device  grooming skills, all at 08/22/2020 0704   [place]  minimum assist (75% or more patient effort) at 08/22/2020 0704   Time Frame   short-term goal (STG), 1 week at 08/22/2020 0704   Grooming Goal 2   Activity/Assistive Device  grooming skills, all at 08/22/2020 0704   [place]  modified independence at 08/22/2020 0704   Time Frame  long-term goal (LTG), 4 weeks at 08/22/2020 0704   Toileting Goal 1   Activity/Assistive Device  toileting skills, all at 08/22/2020 0704   [place]  minimum assist (75% or more patient effort) at 08/22/2020 0704   Time Frame  short-term goal (STG), 1 week at 08/22/2020 0704   Toileting Goal 2   Activity/Assistive Device  toileting skills, all at 08/22/2020 0704   [place]  modified independence at 08/22/2020 0704   Time Frame  long-term goal (LTG), 4 weeks at 08/22/2020 0704

## 2020-08-22 NOTE — PLAN OF CARE
Problem: Rehabilitation (IRF) Plan of Care  Goal: Plan of Care Review  Outcome: Progressing  Flowsheets (Taken 8/22/2020 1330)  Plan of Care Reviewed With: patient  Outcome Summary: Formal ST not recommended. Pt performance WFL for all areas of cog-communication skills upon initial evaluation. If higher level cog-communication concerns arise please refer for additional ST evaluation.

## 2020-08-23 ENCOUNTER — APPOINTMENT (INPATIENT)
Dept: PHYSICAL THERAPY | Facility: REHABILITATION | Age: 70
DRG: 949 | End: 2020-08-23
Payer: COMMERCIAL

## 2020-08-23 LAB
GLUCOSE BLD-MCNC: 114 MG/DL (ref 70–99)
GLUCOSE BLD-MCNC: 128 MG/DL (ref 70–99)
GLUCOSE BLD-MCNC: 153 MG/DL (ref 70–99)
GLUCOSE BLD-MCNC: 167 MG/DL (ref 70–99)
POCT TEST: ABNORMAL

## 2020-08-23 PROCEDURE — 97110 THERAPEUTIC EXERCISES: CPT | Mod: GP

## 2020-08-23 PROCEDURE — 63600000 HC DRUGS/DETAIL CODE: Performed by: PHYSICAL MEDICINE & REHABILITATION

## 2020-08-23 PROCEDURE — 63600000 HC DRUGS/DETAIL CODE: Performed by: STUDENT IN AN ORGANIZED HEALTH CARE EDUCATION/TRAINING PROGRAM

## 2020-08-23 PROCEDURE — 97116 GAIT TRAINING THERAPY: CPT | Mod: GP

## 2020-08-23 PROCEDURE — 12800001 HC ROOM AND CARE SEMIPRIVATE REHAB-BRAIN INJ

## 2020-08-23 PROCEDURE — 63700000 HC SELF-ADMINISTRABLE DRUG: Performed by: PHYSICAL MEDICINE & REHABILITATION

## 2020-08-23 RX ADMIN — HEPARIN SODIUM 5000 UNITS: 5000 INJECTION, SOLUTION INTRAVENOUS; SUBCUTANEOUS at 13:05

## 2020-08-23 RX ADMIN — HEPARIN SODIUM 5000 UNITS: 5000 INJECTION, SOLUTION INTRAVENOUS; SUBCUTANEOUS at 06:28

## 2020-08-23 RX ADMIN — HEPARIN SODIUM 5000 UNITS: 5000 INJECTION, SOLUTION INTRAVENOUS; SUBCUTANEOUS at 21:18

## 2020-08-23 RX ADMIN — LEVETIRACETAM 1000 MG: 500 TABLET ORAL at 08:05

## 2020-08-23 RX ADMIN — LISINOPRIL 10 MG: 10 TABLET ORAL at 08:04

## 2020-08-23 RX ADMIN — ATORVASTATIN CALCIUM 10 MG: 10 TABLET, FILM COATED ORAL at 08:05

## 2020-08-23 RX ADMIN — DEXAMETHASONE 4 MG: 4 TABLET ORAL at 18:24

## 2020-08-23 RX ADMIN — DEXAMETHASONE 4 MG: 4 TABLET ORAL at 06:29

## 2020-08-23 RX ADMIN — LEVETIRACETAM 1000 MG: 500 TABLET ORAL at 21:18

## 2020-08-23 RX ADMIN — DEXAMETHASONE 4 MG: 4 TABLET ORAL at 23:22

## 2020-08-23 RX ADMIN — DEXAMETHASONE 4 MG: 4 TABLET ORAL at 12:27

## 2020-08-23 NOTE — PLAN OF CARE
Problem: Rehabilitation (IRF) Plan of Care  Goal: Plan of Care Review  Outcome: Progressing  Flowsheets (Taken 8/23/2020 0854)  Plan of Care Reviewed With: patient  IRF Plan of Care Review: progress ongoing, continue  Outcome Summary: Pt denied any pain. Good appetite. Moved bowels today. He ambulated to bathroom with walker.

## 2020-08-23 NOTE — PROGRESS NOTES
Patient: Guy Jarrett  Location: [facility]  MRN: [id number]  Today's date: 8/23/2020    History of Present Illness  Guy is a 70 y.o. male admitted on 8/21/2020 with Brain metastases (CMS/HCC).     Pt is a 71 y/o male admitted to [facility] on 8/21/20 s/p craniotomy and mass resection. Pt with known metastatic melanoma diagnosed in August 2019 receiving chemotherapy diagnosed with brain metastasis on 4/22/2020 in the right temporal lobe treated with gamma knife on 5/5/2020 found to have rapidly enlarging right frontal metastases on subsequent surveillance imaging.  He was admitted to [facility] on 8/18/2020.  Dr. Barrera from neurosurgery was consulted and performed craniotomy and mass resection.    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    PT Vitals    Date/Time Pulse HR Source BP BP Location BP Method Pt Position [facility]   08/23/20 1130 44 Left Radial 126/67 Left upper arm Automatic Sitting    08/23/20 1155 80 Left Radial -- -- -- -- KR      PT Pain    Date/Time Pain Type Pref Pain Scale Rating: Rest Rating: Rest [facility]   08/23/20 1130 Pain Assessment word (verbal rating pain scale) 0 0 - no pain    08/23/20 1155 Pain Reassessment;Post Activity word (verbal rating pain scale) 0 0 - no pain KR          Prior Living Environment      Most Recent Value   Lives With  spouse   Living Arrangements  house [2SH]   Living Environment Comment  Pt lives with wife Malka,   Location, Kitchen  first (main) floor   Kitchen Access Comment  wife performs cooking tasks   Location, Laundry Room  second floor, must negotiate stairs to access   Laundry Room Access Comment  wife performs laundry (pt reports he assists at times)   Location, Patient Bedroom  second floor, must negotiate stairs to access   Location, Bathroom  second floor, must  negotiate stairs to access   Bathroom Access Comment  walk-in shower s GB, standard height toilet          Prior Level of Function      Most Recent Value   Dominant Hand  right   Ambulation  independent   Transferring  independent   Toileting  independent   Bathing  independent   Dressing  independent   Eating  independent   Prior Level of Function Comment  Independent PTA. (+) . Enjoys golf and household projects.   Assistive Device/Animal Currently Used at Home  none          IRF PT Evaluation and Treatment - 08/23/20 1130        Time Calculation    Start Time  1130     Stop Time  1200     Time Calculation (min)  30 min        Session Details    Document Type  daily treatment/progress note     Mode of Treatment  physical therapy;individual therapy        Transfers    Transfers  stand pivot transfer        Sit to Stand Transfer    [place], Sit to Stand Transfer  touching/steadying assist     Verbal Cues  hand placement;safety     Assistive Device  walker, front-wheeled     Comment  fair carryover of safety techniques        Stand to Sit Transfer    [place], Stand to Sit Transfer  touching/steadying assist     Verbal Cues  hand placement;safety     Assistive Device  walker, front-wheeled     Comment  fair carryover of safety techniques        Stand Pivot Transfer    [place], Stand Pivot/Stand Step Transfer  touching/steadying assist     Assistive Device  walker, front-wheeled     Comment  ambulatory approach. assist for balance. verbal cues for safety and walker management        Gait Training    [place], Gait  minimum assist (75% or more patient effort)     Assistive Device  walker, front-wheeled     Distance in Feet  200 feet     Gait Pattern Utilized  step-through     Deviations/Abnormal Patterns (Gait)  step length decreased;stride length decreased     Comment  assist for balance. verbal cues for safety as pt attempts to ambulate at increased speed with resultant decreased coordination  and safety with walker management. pt simulated running and educated on safety.         Aerobic Exercise    Type (Aerobic Exercise)  other (see comments)     Time Performed (Aerobic Exercise)  20     Comment (Aerobic Exercise)  level 3-5 B LEs only. pt maintained  steps/min throughout        Daily Progress Summary (PT)    Daily Outcome Statement (PT)  Pt requested to complete full mile on nu step. educated provided several times during mobility secondary to pt purposefully making knees shake, turning the wrong direction, and simulating running with the RW. pt stated he was trying to get a reaction out of the therapist. anticipate pt will need additional education secondary to decreased insight into his deficits.                        Education provided this session. See the Patient Education summary report for full details.    IRF PT Goals      Most Recent Value   Bed Mobility Goal 1   Activity/Assistive Device  sit to supine/supine to sit, rolling to left, rolling to right at 08/22/2020 1100   [place]  supervision required at 08/22/2020 1100   Time Frame  short-term goal (STG), 1 week at 08/22/2020 1100   Bed Mobility Goal 2   Activity/Assistive Device  sit to supine/supine to sit, rolling to left, rolling to right at 08/22/2020 1100   [place]  modified independence at 08/22/2020 1100   Time Frame  long-term goal (LTG), 21 days or less at 08/22/2020 1100   Transfer Goal 1   Activity/Assistive Device  sit-to-stand/stand-to-sit, bed-to-chair/chair-to-bed, stand pivot at 08/22/2020 1100   [place]  minimum assist (75% or more patient effort), verbal cues required at 08/22/2020 1100   Time Frame  short-term goal (STG), 1 week at 08/22/2020 1100   Transfer Goal 2   Activity/Assistive Device  sit-to-stand/stand-to-sit, bed-to-chair/chair-to-bed, car transfer, stand pivot at 08/22/2020 1100   [place]  modified independence at 08/22/2020 1100   Time Frame  long-term goal (LTG), 21 days or less at  08/22/2020 1100   Gait/Walking Locomotion Goal 1   Activity/Assistive Device  gait (walking locomotion) at 08/22/2020 1100   Distance  150 feet at 08/22/2020 1100   [place]  minimum assist (75% or more patient effort), verbal cues required at 08/22/2020 1100   Time Frame  short-term goal (STG), 1 week at 08/22/2020 1100   Gait/Walking Locomotion Goal 2   Activity/Assistive Device  gait (walking locomotion) at 08/22/2020 1100   Distance  200 feet at 08/22/2020 1100   [place]  modified independence at 08/22/2020 1100   Time Frame  long-term goal (LTG), 21 days or less at 08/22/2020 1100   Stairs Goal 1   Activity/Assistive Device  ascending stairs, descending stairs, using handrail, right, step-to-step at 08/22/2020 1100   Number of Stairs  8 at 08/22/2020 1100   [place]  minimum assist (75% or more patient effort), verbal cues required at 08/22/2020 1100   Time Frame  short-term goal (STG), 1 week at 08/22/2020 1100   Stairs Goal 2   Activity/Assistive Device  ascending stairs, descending stairs, using handrail, right, step-to-step at 08/22/2020 1100   Number of Stairs  12 at 08/22/2020 1100   [place]  modified independence at 08/22/2020 1100   Time Frame  long-term goal (LTG), 21 days or less at 08/22/2020 1100

## 2020-08-24 ENCOUNTER — APPOINTMENT (OUTPATIENT)
Dept: PSYCHOLOGY | Facility: CLINIC | Age: 70
End: 2020-08-24
Attending: PHYSICAL MEDICINE & REHABILITATION
Payer: COMMERCIAL

## 2020-08-24 ENCOUNTER — APPOINTMENT (INPATIENT)
Dept: PHYSICAL THERAPY | Facility: REHABILITATION | Age: 70
DRG: 949 | End: 2020-08-24
Payer: COMMERCIAL

## 2020-08-24 ENCOUNTER — APPOINTMENT (INPATIENT)
Dept: OTHER | Facility: REHABILITATION | Age: 70
DRG: 949 | End: 2020-08-24
Payer: COMMERCIAL

## 2020-08-24 ENCOUNTER — APPOINTMENT (INPATIENT)
Dept: OCCUPATIONAL THERAPY | Facility: REHABILITATION | Age: 70
DRG: 949 | End: 2020-08-24
Payer: COMMERCIAL

## 2020-08-24 PROBLEM — M19.90 ARTHRITIS: Status: ACTIVE | Noted: 2020-08-24

## 2020-08-24 PROBLEM — G40.909 SEIZURE DISORDER (CMS/HCC): Status: ACTIVE | Noted: 2020-08-21

## 2020-08-24 LAB
GLUCOSE BLD-MCNC: 117 MG/DL (ref 70–99)
GLUCOSE BLD-MCNC: 157 MG/DL (ref 70–99)
GLUCOSE BLD-MCNC: 159 MG/DL (ref 70–99)
GLUCOSE BLD-MCNC: 78 MG/DL (ref 70–99)
POCT TEST: ABNORMAL
POCT TEST: NORMAL

## 2020-08-24 PROCEDURE — 12800001 HC ROOM AND CARE SEMIPRIVATE REHAB-BRAIN INJ

## 2020-08-24 PROCEDURE — 63600000 HC DRUGS/DETAIL CODE: Performed by: STUDENT IN AN ORGANIZED HEALTH CARE EDUCATION/TRAINING PROGRAM

## 2020-08-24 PROCEDURE — 97799 UNLISTED PHYSCL MED/REHAB PX: CPT

## 2020-08-24 PROCEDURE — 97129 THER IVNTJ 1ST 15 MIN: CPT | Mod: GO

## 2020-08-24 PROCEDURE — 63600000 HC DRUGS/DETAIL CODE: Performed by: PHYSICAL MEDICINE & REHABILITATION

## 2020-08-24 PROCEDURE — 63700000 HC SELF-ADMINISTRABLE DRUG: Performed by: HOSPITALIST

## 2020-08-24 PROCEDURE — 97530 THERAPEUTIC ACTIVITIES: CPT | Mod: GO

## 2020-08-24 PROCEDURE — 63700000 HC SELF-ADMINISTRABLE DRUG: Performed by: PHYSICAL MEDICINE & REHABILITATION

## 2020-08-24 PROCEDURE — 97130 THER IVNTJ EA ADDL 15 MIN: CPT | Mod: GO

## 2020-08-24 PROCEDURE — 97530 THERAPEUTIC ACTIVITIES: CPT | Mod: GP

## 2020-08-24 PROCEDURE — 97110 THERAPEUTIC EXERCISES: CPT | Mod: GP

## 2020-08-24 PROCEDURE — 90791 PSYCH DIAGNOSTIC EVALUATION: CPT | Performed by: CLINICAL NEUROPSYCHOLOGIST

## 2020-08-24 PROCEDURE — 97116 GAIT TRAINING THERAPY: CPT | Mod: GP

## 2020-08-24 RX ORDER — PANTOPRAZOLE SODIUM 40 MG/1
40 TABLET, DELAYED RELEASE ORAL DAILY
Status: DISCONTINUED | OUTPATIENT
Start: 2020-08-24 | End: 2020-08-29 | Stop reason: HOSPADM

## 2020-08-24 RX ADMIN — PANTOPRAZOLE SODIUM 40 MG: 40 TABLET, DELAYED RELEASE ORAL at 12:17

## 2020-08-24 RX ADMIN — DEXAMETHASONE 4 MG: 4 TABLET ORAL at 23:09

## 2020-08-24 RX ADMIN — HEPARIN SODIUM 5000 UNITS: 5000 INJECTION, SOLUTION INTRAVENOUS; SUBCUTANEOUS at 21:27

## 2020-08-24 RX ADMIN — LEVETIRACETAM 1000 MG: 500 TABLET ORAL at 21:28

## 2020-08-24 RX ADMIN — HEPARIN SODIUM 5000 UNITS: 5000 INJECTION, SOLUTION INTRAVENOUS; SUBCUTANEOUS at 14:38

## 2020-08-24 RX ADMIN — LISINOPRIL 10 MG: 10 TABLET ORAL at 07:50

## 2020-08-24 RX ADMIN — DEXAMETHASONE 4 MG: 4 TABLET ORAL at 06:17

## 2020-08-24 RX ADMIN — DEXAMETHASONE 4 MG: 4 TABLET ORAL at 17:04

## 2020-08-24 RX ADMIN — DEXAMETHASONE 4 MG: 4 TABLET ORAL at 12:17

## 2020-08-24 RX ADMIN — ATORVASTATIN CALCIUM 10 MG: 10 TABLET, FILM COATED ORAL at 07:50

## 2020-08-24 RX ADMIN — LEVETIRACETAM 1000 MG: 500 TABLET ORAL at 07:50

## 2020-08-24 RX ADMIN — HEPARIN SODIUM 5000 UNITS: 5000 INJECTION, SOLUTION INTRAVENOUS; SUBCUTANEOUS at 06:17

## 2020-08-24 ASSESSMENT — COGNITIVE AND FUNCTIONAL STATUS - GENERAL
THOUGHT_PROCESS: WNL
RECENT MEMORY: WNL
APPEARANCE: WELL GROOMED
INSIGHT: INTACT
APPETITE: NO CHANGE
ORIENTATION: FULLY ORIENTED
EST. PREMORBID INTELLIGENCE: ABOVE AVERAGE
ATTENTION: WNL
AFFECT: FULL RANGE;TEARFUL
EYE_CONTACT: WNL
PSYCHOMOTOR FUNCTIONING: WNL
THOUGHT_CONTENT: APPROPRIATE
MOOD: DEPRESSED;MOTIVATED
REMOTE MEMORY: WNL
SLEEP_WAKE_CYCLE: NO CHANGE
IMPULSE CONTROL: INTACT
DELUSIONS: NONE OR AGE APPROPRIATE
SPEECH: REGULAR
CONCENTRATION: WNL
LIBIDO: NON-CONTRIBUTORY
PERCEPTUAL FUNCTION: NORMAL
AROUSAL LEVEL: ATTENTIVE

## 2020-08-24 ASSESSMENT — BALANCE ASSESSMENTS: TOTAL SCORE: 36

## 2020-08-24 NOTE — PLAN OF CARE
Problem: Rehabilitation (IRF) Plan of Care  Goal: Plan of Care Review  Flowsheets (Taken 8/24/2020 7803)  Plan of Care Reviewed With: patient  IRF Plan of Care Review: progress ongoing, continue  Outcome Summary: Pt completed FMC assessments.  Noted to have impaired FMC and strength in R hand, pt reports this is premorbid 2* cervical issues, LUE appearsy grossly intact at this time.

## 2020-08-24 NOTE — ASSESSMENT & PLAN NOTE
Exacerbated by immunotherapy.  Patient on home prednisone 10 mg daily.  Holding for now while on Decadron.

## 2020-08-24 NOTE — PROGRESS NOTES
Inpatient Psychology Initial Intake    Duration:  30 minutes    Guy Jarrett, : 1950, a 70 y.o. male, for initial evaluation visit to discuss Adjustment to Disability.    HPI: Guy Jarrett is admitted to [facility] for comprehensive inpatient rehabilitation for Brain Injury with functional deficits in cognitive function;self-care;mobility;safety. Patient is receiving the following services: occupational therapy;physical therapy;speech language pathology (test for higher level cog defecits ).  Mr. Jarrett is a 70-year-old male with known metastatic melanoma diagnosed in 2019 receiving chemotherapy diagnosed with brain metastasis on 2020 in the right temporal lobe treated with gamma knife on 2020 found to have rapidly enlarging right frontal metastases on subsequent surveillance imaging.  [facility].  Dr. Barrera from neurosurgery was consulted and performed craniotomy and mass resection.  He was started on Keppra for intraoperative seizure.  Postoperatively he was started on Decadron taper.   He was cleared to initiate heparin for DVT prophylaxis.   Next dose Nivolumab due 9/15/20.  He was ultimately determined to medically stable for transfer to [facility] on 2020 for an acute inpatient rehabilitation program to address deficits related to metastatic melanoma with brain metastases status post craniotomy and resection.         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  No family history on file.  Social History     Socioeconomic History   • Marital status:      Spouse name: None   • Number of children: None   • Years of education: None   • Highest education level: None   Occupational History   • None   Social Needs   • Financial  resource strain: None   • Food insecurity:     Worry: None     Inability: None   • Transportation needs:     Medical: None     Non-medical: None   Tobacco Use   • Smoking status: Never Smoker   • Smokeless tobacco: Never Used   Substance and Sexual Activity   • Alcohol use: Yes   • Drug use: Never   • Sexual activity: None   Lifestyle   • Physical activity:     Days per week: None     Minutes per session: None   • Stress: None   Relationships   • Social connections:     Talks on phone: None     Gets together: None     Attends Orthodoxy service: None     Active member of club or organization: None     Attends meetings of clubs or organizations: None     Relationship status: None   • Intimate partner violence:     Fear of current or ex partner: None     Emotionally abused: None     Physically abused: None     Forced sexual activity: None   Other Topics Concern   • None   Social History Narrative   • None       Previous Mental Health History:   Previous Mental Health Treatment:  N/A  Previous Suicidal Behavior:  N/A  Previous Self-Injurious Behavior:  N/A  Previous Homicidal Behavior:  N/A  Previous Substance Abuse Treatment: N/A    Current Facility-Administered Medications   Medication Dose Route Frequency Provider Last Rate Last Dose   • acetaminophen (TYLENOL) tablet 650 mg  650 mg oral q4h PRN Brando Love DO       • acetaminophen-codeine (TYLENOL #3) 300-30 mg per tablet 1 tablet  1 tablet oral q4h PRN Brando Love DO       • atorvastatin (LIPITOR) tablet 10 mg  10 mg oral Daily Brando Love DO   10 mg at 08/24/20 0750   • dexAMETHasone (DECADRON) tablet 4 mg  4 mg oral q6h Gerard Dietrich MD   4 mg at 08/24/20 1217    Followed by   • [START ON 8/25/2020] dexAMETHasone (DECADRON) tablet 4 mg  4 mg oral q12h Gerard Rockwell MD        Followed by   • [START ON 8/30/2020] dexAMETHasone (DECADRON) tablet 2 mg  2 mg oral q12h Gerard Rockwell MD       • [START ON 9/3/2020] dexAMETHasone  (DECADRON) tablet 2 mg  2 mg oral Daily Gerard Dietrich MD       • glucose chewable tablet 16-32 g of dextrose  16-32 g of dextrose oral PRN Brando Love DO        Or   • dextrose 40 % oral gel 15-30 g of dextrose  15-30 g of dextrose oral PRN Brando Love DO        Or   • glucagon (GLUCAGEN) injection 1 mg  1 mg intramuscular PRN Brando Love DO        Or   • dextrose in water injection 12.5 g  25 mL intravenous PRN Brando Love DO       • heparin (porcine) 5,000 unit/mL injection 5,000 Units  5,000 Units subcutaneous q8h MALGORZATA Brando Love DO   5,000 Units at 08/24/20 1438   • insulin aspart U-100 (NovoLOG) pen 3-5 Units  3-5 Units subcutaneous With meals & nightly Brando Love DO   3 Units at 08/22/20 2159   • levETIRAcetam (KEPPRA) tablet 1,000 mg  1,000 mg oral BID Brando Love DO   1,000 mg at 08/24/20 0750   • lisinopriL (PRINIVIL) tablet 10 mg  10 mg oral Daily Brando Love DO   10 mg at 08/24/20 0750   • ondansetron ODT (ZOFRAN-ODT) disintegrating tablet 4 mg  4 mg oral q8h PRN Brando Love DO       • pantoprazole (PROTONIX) tablet,delayed release (DR/EC) 40 mg  40 mg oral Daily Charly Bailey MD   40 mg at 08/24/20 1217   • zinc oxide-cod liver oil (DESITIN) 40 % topical paste 1 application  1 application Topical Daily Brando Love DO   1 application at 08/22/20 0804       Current Evaluation:   Mental Status Exam:  Arousal Level: Attentive  Appearance: Well Groomed  Speech: Regular  Psychomotor Functioning: WNL  Eye Contact: WNL  Est. Premorbid Intelligence: Above average(PETTY and MA)  Orientation: Fully oriented  Attention: WNL  Concentration: WNL  Recent Memory: WNL  Remote Memory: WNL  Thought Content: Appropriate  Thought Process: WNL  Insight: Intact  Perceptual Function: Normal  Delusions: None or age appropriate  Sleeping: No Change  Appetite: No Change  Libido: Non-Contributory  Affect: Full Range, Tearful  Mood: Depressed,  "Motivated    [place] Suicide Severity Rating Scale:  Done today  1. Within the past month, have you wished you were dead or wished you could go to sleep and not wake up?: No  2. Within the past month, have you actually had any thoughts of killing yourself?: No  6. Have you ever done anything, started to do anything, or prepared to do anything to end your life?: No    Risk Assessment:   Suicidal Ideation: Not Present  Self Injurious Behavior:  Not Present  Irritability:  Not Present  Homicidal Behavior: Not Present  Estimate of Risk:  None  If risk identified have suicide precautions been implemented? N/A    Interventions  Acceptance & Adjustment  Insight Development  Monitoring of Symptoms  Psychoeducation  Self Regulation Strategies  Other:  Empathic listening    Psychoeducation provided on:  Brain Trauma and Recovery     Recommendations:   Individual Therapy   30 minutes one to two times weekly    Goals     • Increase adjustment to rehabilitation facility.           Visit Diagnosis:     1. Metastatic melanoma (CMS/HCC)    2. Adjustment disorder with depressed mood        Diagnostic Impression:   Weekly Outcome Statement: Patient was oriented to self, situation, date, and location. He denied significant changes in cognitive abilities. He described his mood as \"pretty good\" although he noted that he can be melancholy when thinking about his prognosis and the impact it may have on his family. He was appropriately tearful when discussing emotional topics. He indicated that talking to family/friends, reading, and using humor often helps him cope with distressing thoughts. He identified goals which included traveling, golf, and seeing friends. He described his sleep as okay, his energy as okay, and his appetite as good. Psychology will continue to follow while he is inpatient.     AURELIANO Oates.SANGEETA @ 4:52 PM  "

## 2020-08-24 NOTE — CONSULTS
Consult Note    Subjective     Guy Jarrett is a 70 y.o. right-handed male with past medical or significant for hypertension, nephrolithiasis, cervical disc disease, and metastatic melanoma, initially diagnosed August 2019, receiving immunotherapy with brain metastasis 4/20/2020 in the right temporal lobe treated with gamma knife on 5/5/2020, and found to have rapidly enlarging right frontal metastasis on surveillance imaging.  MRI of the MRI of the brain showed 2 cm centrally necrotic and or hemorrhagic mass lesion in the right posterior frontal lobe extending to the dural surface of the falx, likely involving supplementary motor cortex with marked surrounding vasogenic edema and local mass-effect without evidence of midline shift or herniation.  There was redemonstration of tiny treated right temporal metastasis significantly decreased in size when compared with treatment planning study. Patient noticed increasing gait imbalance and left-sided weakness while golfing and driving a stick shift as well.  He was admitted to [facility] on 8/18/2024 right craniotomy/tumor resection performed by Dr. Barrera.  Patient sustained a intraoperative seizure.  Levetiracetam dosage increase.  Long-term monitoring EEG showed no evidence of epileptiform discharges postoperatively.  Hospital course notable for increased left-sided weakness and foot drop.  Otherwise, uneventful.    Outside records reviewed.    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    Allergies: Penicillins    Current Facility-Administered Medications   Medication Dose Route Frequency Provider Last Rate Last Dose   • acetaminophen (TYLENOL) tablet 650 mg  650 mg oral q4h PRN Brando Love, DO       • acetaminophen-codeine (TYLENOL  #3) 300-30 mg per tablet 1 tablet  1 tablet oral q4h PRN Brando Love DO       • atorvastatin (LIPITOR) tablet 10 mg  10 mg oral Daily Brando Love DO   10 mg at 08/24/20 0750   • dexAMETHasone (DECADRON) tablet 4 mg  4 mg oral q6h Gerard Dietrich MD   4 mg at 08/24/20 0617    Followed by   • [START ON 8/25/2020] dexAMETHasone (DECADRON) tablet 4 mg  4 mg oral q12h [facility] Gerard Dietrich MD        Followed by   • [START ON 8/30/2020] dexAMETHasone (DECADRON) tablet 2 mg  2 mg oral q12h [facility] Gerard Dietrich MD       • [START ON 9/3/2020] dexAMETHasone (DECADRON) tablet 2 mg  2 mg oral Daily Gerard Dietrich MD       • glucose chewable tablet 16-32 g of dextrose  16-32 g of dextrose oral PRN Brando Love DO        Or   • dextrose 40 % oral gel 15-30 g of dextrose  15-30 g of dextrose oral PRN Brando Love DO        Or   • glucagon (GLUCAGEN) injection 1 mg  1 mg intramuscular PRN Brando Love DO        Or   • dextrose in water injection 12.5 g  25 mL intravenous PRN Brando Love DO       • heparin (porcine) 5,000 unit/mL injection 5,000 Units  5,000 Units subcutaneous q8h [facility] Brando Love DO   5,000 Units at 08/24/20 0617   • insulin aspart U-100 (NovoLOG) pen 3-5 Units  3-5 Units subcutaneous With meals & nightly Brando Love DO   3 Units at 08/22/20 5069   • levETIRAcetam (KEPPRA) tablet 1,000 mg  1,000 mg oral BID Brando Love DO   1,000 mg at 08/24/20 0750   • lisinopriL (PRINIVIL) tablet 10 mg  10 mg oral Daily Brando Love DO   10 mg at 08/24/20 0750   • ondansetron ODT (ZOFRAN-ODT) disintegrating tablet 4 mg  4 mg oral q8h PRN Love, Brando P, DO       • pantoprazole (PROTONIX) tablet,delayed release (DR/EC) 40 mg  40 mg oral Daily Charly Bailey MD       • zinc oxide-cod liver oil (DESITIN) 40 % topical paste 1 application  1 application Topical Daily Brando Love, DO   1 application at 08/22/20 0804          Social History:   Social  History     Socioeconomic History   • Marital status:      Spouse name: None   • Number of children: None   • Years of education: None   • Highest education level: None   Occupational History   • None   Social Needs   • Financial resource strain: None   • Food insecurity:     Worry: None     Inability: None   • Transportation needs:     Medical: None     Non-medical: None   Tobacco Use   • Smoking status: Never Smoker   • Smokeless tobacco: Never Used   Substance and Sexual Activity   • Alcohol use: Yes   • Drug use: Never   • Sexual activity: None   Lifestyle   • Physical activity:     Days per week: None     Minutes per session: None   • Stress: None   Relationships   • Social connections:     Talks on phone: None     Gets together: None     Attends Hoahaoism service: None     Active member of club or organization: None     Attends meetings of clubs or organizations: None     Relationship status: None   • Intimate partner violence:     Fear of current or ex partner: None     Emotionally abused: None     Physically abused: None     Forced sexual activity: None   Other Topics Concern   • None   Social History Narrative   • None       Family History: No family history on file.    Review of Systems  All other systems reviewed and negative except as noted in the HPI.    Vital signs in last 24 hours:  Temp:  [36.3 °C (97.3 °F)-36.7 °C (98.1 °F)] 36.3 °C (97.3 °F)  Heart Rate:  [44-57] 57  Resp:  [16-18] 18  BP: (111-144)/(61-71) 119/67    Objective     Physical Exam  HEENT: PERRLA, EOMI, sclera icteric, mucous membranes dry, craniotomy incision with staples clean, dry and intact  Neck: Supple, JVP also 5 cm  Heart: Regular rhythm, no murmurs, rubs or gallops  Lungs: Clear auscultation  Abdomen: NABS, soft, nontender, no rebound, or guarding  Extremities: Trace pretibial edema  Neuro: Patient alert and oriented x3 with 4/5 left-sided weakness and discoordination      Labs  Results for orders placed or performed  during the hospital encounter of 08/21/20   CBC and differential   Result Value Ref Range    WBC 12.85 (H) 3.80 - 10.50 K/uL    RBC 4.10 (L) 4.50 - 5.80 M/uL    Hemoglobin 12.2 (L) 13.7 - 17.5 g/dL    Hematocrit 38.2 (L) 40.1 - 51.0 %    MCV 93.2 83.0 - 98.0 fL    MCH 29.8 28.0 - 33.2 pg    MCHC 31.9 (L) 32.2 - 36.5 g/dL    RDW 13.8 11.6 - 14.4 %    Platelets 218 150 - 350 K/uL    MPV 10.8 9.4 - 12.4 fL    Differential Type Auto     nRBC 0.0 <=0.0 %    Immature Granulocytes 0.6 %    Neutrophils 91.1 %    Lymphocytes 4.6 %    Monocytes 3.5 %    Eosinophils 0.1 %    Basophils 0.1 %    Immature Granulocytes, Absolute 0.08 0.00 - 0.08 K/uL    Neutrophils, Absolute 11.71 (H) 1.70 - 7.00 K/uL    Lymphocytes, Absolute 0.59 (L) 1.20 - 3.50 K/uL    Monocytes, Absolute 0.45 0.30 - 1.00 K/uL    Eosinophils, Absolute 0.01 (L) 0.04 - 0.54 K/uL    Basophils, Absolute 0.01 0.01 - 0.10 K/uL   Comprehensive metabolic panel   Result Value Ref Range    Sodium 139 136 - 144 mEQ/L    Potassium 4.1 3.6 - 5.1 mEQ/L    Chloride 106 98 - 109 mEQ/L    CO2 24 22 - 32 mEQ/L    BUN 41 (H) 8 - 20 mg/dL    Creatinine 1.0 0.8 - 1.3 mg/dL    Glucose 143 (H) 70 - 99 mg/dL    Calcium 8.6 (L) 8.9 - 10.3 mg/dL    AST (SGOT) 24 15 - 41 IU/L    ALT (SGPT) 38 16 - 63 IU/L    Alkaline Phosphatase 81 35 - 126 IU/L    Total Protein 5.7 (L) 6.0 - 8.2 g/dL    Albumin 3.3 (L) 3.4 - 5.0 g/dL    Bilirubin, Total 0.5 0.3 - 1.2 mg/dL    eGFR >60.0 >=60.0 mL/min/1.73m*2    Anion Gap 9 3 - 15 mEQ/L   POCT Glucose   Result Value Ref Range    POCT Bedside Glucose 129 (H) 70 - 99 mg/dL    POC Test POC    POCT Glucose   Result Value Ref Range    POCT Bedside Glucose 189 (H) 70 - 99 mg/dL    POC Test POC    POCT Glucose   Result Value Ref Range    POCT Bedside Glucose 130 (H) 70 - 99 mg/dL    POC Test POC    POCT Glucose   Result Value Ref Range    POCT Bedside Glucose 192 (H) 70 - 99 mg/dL    POC Test POC    POCT Glucose   Result Value Ref Range    POCT Bedside Glucose  219 (H) 70 - 99 mg/dL    POC Test POC    POCT Glucose   Result Value Ref Range    POCT Bedside Glucose 200 (H) 70 - 99 mg/dL    POC Test POC    POCT Glucose   Result Value Ref Range    POCT Bedside Glucose 114 (H) 70 - 99 mg/dL    POC Test POC    POCT Glucose   Result Value Ref Range    POCT Bedside Glucose 167 (H) 70 - 99 mg/dL    POC Test POC    POCT Glucose   Result Value Ref Range    POCT Bedside Glucose 128 (H) 70 - 99 mg/dL    POC Test POC    POCT Glucose   Result Value Ref Range    POCT Bedside Glucose 153 (H) 70 - 99 mg/dL    POC Test POC    POCT Glucose   Result Value Ref Range    POCT Bedside Glucose 117 (H) 70 - 99 mg/dL    POC Test POC        I have reviewed the patient's pertinent labs.  Significant abnormals are BUN 41, creatinine 1.0, WBCs 12.85, hemoglobin 12.2.        Assessment   70 y.o. male being consulted for patient co-management       Plan     Brain metastases (CMS/HCC)  Assessment & Plan  MRI of the MRI of the brain showed 2 cm centrally necrotic and or hemorrhagic mass lesion in the right posterior frontal lobe extending to the dural surface of the falx, likely involving supplementary motor cortex with marked surrounding vasogenic edema and local mass-effect without evidence of midline shift or herniation.  There was redemonstration of tiny treated right temporal metastasis significantly decreased in size when compared with treatment planning study.  Continue Decadron taper and levetiracetam.  Follow-up immunotherapy 9/15/2020.        Seizure disorder (CMS/HCC)  Assessment & Plan  Patient experienced intraoperative seizure.  Long-term monitoring postoperative EEG showed no epileptiform discharges but did show diffuse slowing.  Continue levetiracetam.  Continue per Dr. Barrera.    HTN (hypertension)  Assessment & Plan  Follow orthostatic blood pressures on lisinopril.    Arthritis  Assessment & Plan  Exacerbated by immunotherapy.  Patient on home prednisone 10 mg daily.  Holding for now while on  Decadron.    At high risk for deep venous thrombosis  Assessment & Plan  Continue subcutaneous Lovenox and SCDs.      Charly Bailey MD    8/24/2020

## 2020-08-24 NOTE — PLAN OF CARE
Problem: Rehabilitation (IRF) Plan of Care  Goal: Plan of Care Review  Outcome: Progressing  Flowsheets (Taken 8/24/2020 1224)  Plan of Care Reviewed With: patient  IRF Plan of Care Review: progress ongoing, continue  Outcome Summary: Further assessments completed, noted with saccadic instrusions during pursuits and impaired delayed recall. Required cues for appropriate speed during HHM.

## 2020-08-24 NOTE — PROGRESS NOTES
Patient: Guy Jarrett  Location: [facility] [address]  MRN: [id number]  Today's date: 8/24/2020    History of Present Illness  Guy is a 70 y.o. male admitted on 8/21/2020 with Brain metastases (CMS/HCC).     Pt is a 71 y/o male admitted to [facility] on 8/21/20 s/p craniotomy and mass resection. Pt with known metastatic melanoma diagnosed in August 2019 receiving chemotherapy diagnosed with brain metastasis on 4/22/2020 in the right temporal lobe treated with gamma knife on 5/5/2020 found to have rapidly enlarging right frontal metastases on subsequent surveillance imaging.  He was admitted to [facility] on 8/18/2020.  Dr. Barrera from neurosurgery was consulted and performed craniotomy and mass resection.    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        Prior Living Environment      Most Recent Value   Lives With  spouse   Living Arrangements  house [2SH]   Living Environment Comment  Pt lives with wife Malka,   Location, Kitchen  first (main) floor   Kitchen Access Comment  wife performs cooking tasks   Location, Laundry Room  second floor, must negotiate stairs to access   Laundry Room Access Comment  wife performs laundry (pt reports he assists at times)   Location, Patient Bedroom  second floor, must negotiate stairs to access   Location, Bathroom  second floor, must negotiate stairs to access   Bathroom Access Comment  walk-in shower s GB, standard height toilet          Prior Level of Function      Most Recent Value   Dominant Hand  right   Ambulation  independent   Transferring  independent   Toileting  independent   Bathing  independent   Dressing  independent   Eating  independent   Prior Level of Function Comment  Independent PTA. (+) . Enjoys golf and household projects.   Assistive Device/Animal Currently Used  at Home  none             08/24/20 1035   Time Calculation   Start Time 1030   Stop Time 1100   Time Calculation (min) 30 min   Session Details   Document Type daily treatment/progress note   Mode of Treatment occupational therapy;individual therapy   Strength (Manual Muscle Testing)   Additional Documentation Hand  Strength Testing (Group)   Hand  Strength Testing   Left Hand: Tip (Pincer) Pinch Strength 9   Left Hand: Lateral (Key) Pinch Strength 17.5   Left Hand: Three Point (Daron) Pinch Strength 15   Right Hand: Tip (Pincer) Pinch Strength 1   Right Hand: Lateral (Key) Pinch Strength 3   Right Hand: Three Point (Daron) Pinch Strength 4   Comment, Right Hand Pt with premorbid limitations in pinch strength from cervical    Balance   Comment, Balance Static standing at table c hips braced on table for BUE reaching task.  Min A required at hips for balance.  Standing 1x3 min.   Motor Skills   Coordination 9 Hole Peg Test of Fine Motor Coordination Results   Results, 9 Hole Peg Test of Fine Motor Coordination Box and Blocks: R-51, L-56.  9 Hole Peg: R- 29.95 sec, L-23 sec   Functional Endurance Good GMC for reaching and placeing task c cones/balls while standing at the table.   Daily Progress Summary (OT)   Daily Outcome Statement (OT) Pt completed FMC assessments.  Noted to have impaired FMC and strength in R hand, pt reports this is premorbid 2* cervical issues, LUE appearsy grossly intact at this time.                        IRF OT Goals      Most Recent Value   Transfer Goal 1   Activity/Assistive Device  toilet at 08/22/2020 0704   [place]  moderate assist (50-74% patient effort) at 08/22/2020 0704   Time Frame  short-term goal (STG), 1 week at 08/22/2020 0704   Transfer Goal 2   Activity/Assistive Device  toilet at 08/22/2020 0704   [place]  modified independence at 08/22/2020 0704   Time Frame  long-term goal (LTG), 4 weeks at 08/22/2020 0704   Transfer Goal 3   Activity/Assistive Device   shower at 08/22/2020 0704   [place]  minimum assist (75% or more patient effort) at 08/22/2020 0704   Time Frame  short-term goal (STG), 1 week at 08/22/2020 0704   Transfer Goal 4   Activity/Assistive Device  shower at 08/22/2020 0704   [place]  modified independence at 08/22/2020 0704   Time Frame  long-term goal (LTG), 4 weeks at 08/22/2020 0704   Bathing Goal 1   Activity/Assistive Device  bathing skills, all at 08/22/2020 0704   [place]  other (see comments) [touching/steadying assist] at 08/22/2020 0704   Time Frame  short-term goal (STG), 1 week at 08/22/2020 0704   Bathing Goal 2   Activity/Assistive Device  bathing skills, all at 08/22/2020 0704   [place]  modified independence at 08/22/2020 0704   Time Frame  long-term goal (LTG), 4 weeks at 08/22/2020 0704   UB Dressing Goal 1   Activity/Assistive Device  upper body dressing at 08/22/2020 0704   [place]  minimum assist (75% or more patient effort) at 08/22/2020 0704   Time Frame  short-term goal (STG), 1 week at 08/22/2020 0704   UB Dressing Goal 2   Activity/Assistive Device  upper body dressing at 08/22/2020 0704   [place]  modified independence at 08/22/2020 0704   Time Frame  long-term goal (LTG), 4 weeks at 08/22/2020 0704   LB Dressing Goal 1   Activity/Assistive Device  lower body dressing at 08/22/2020 0704   [place]  moderate assist (50-74% patient effort) at 08/22/2020 0704   Time Frame  short-term goal (STG), 1 week at 08/22/2020 0704   LB Dressing Goal 2   Activity/Assistive Device  lower body dressing at 08/22/2020 0704   [place]  modified independence at 08/22/2020 0704   Time Frame  long-term goal (LTG), 4 weeks at 08/22/2020 0704   Grooming Goal 1   Activity/Assistive Device  grooming skills, all at 08/22/2020 0704   [place]  minimum assist (75% or more patient effort) at 08/22/2020 0704   Time Frame  short-term goal (STG), 1 week at 08/22/2020 0704   Grooming Goal 2   Activity/Assistive Device   grooming skills, all at 08/22/2020 0704   [place]  modified independence at 08/22/2020 0704   Time Frame  long-term goal (LTG), 4 weeks at 08/22/2020 0704   Toileting Goal 1   Activity/Assistive Device  toileting skills, all at 08/22/2020 0704   [place]  minimum assist (75% or more patient effort) at 08/22/2020 0704   Time Frame  short-term goal (STG), 1 week at 08/22/2020 0704   Toileting Goal 2   Activity/Assistive Device  toileting skills, all at 08/22/2020 0704   [place]  modified independence at 08/22/2020 0704   Time Frame  long-term goal (LTG), 4 weeks at 08/22/2020 0704

## 2020-08-24 NOTE — PLAN OF CARE
Problem: Rehabilitation (IRF) Plan of Care  Goal: Plan of Care Review  Flowsheets (Taken 8/24/2020 7349)  Plan of Care Reviewed With: patient  IRF Plan of Care Review: progress ongoing, continue  Outcome Summary: [place] balance scale 36/56, indicating increased fall risk.

## 2020-08-24 NOTE — PROGRESS NOTES
Patient: Guy Jarrett  Location: [facility] [address]  MRN: [id number]  Today's date: 8/24/2020    History of Present Illness  Guy is a 70 y.o. male admitted on 8/21/2020 with Brain metastases (CMS/HCC).     Pt is a 71 y/o male admitted to [facility] on 8/21/20 s/p craniotomy and mass resection. Pt with known metastatic melanoma diagnosed in August 2019 receiving chemotherapy diagnosed with brain metastasis on 4/22/2020 in the right temporal lobe treated with gamma knife on 5/5/2020 found to have rapidly enlarging right frontal metastases on subsequent surveillance imaging.  He was admitted to [facility] on 8/18/2020.  Dr. Barrera from neurosurgery was consulted and performed craniotomy and mass resection.    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    OT Vitals    Date/Time Pulse HR Source BP BP Location BP Method Pt Position [facility]   08/24/20 1103 57 Monitor 119/67 Left upper arm Automatic Sitting DT      OT Pain    Date/Time Pain Type Pref Pain Scale Rating: Rest Rating: Activity [facility]   08/24/20 1103 Pain Assessment word (verbal rating pain scale) 0 - no pain 0 - no pain DT   08/24/20 1159 Post Activity word (verbal rating pain scale) 0 - no pain 0 - no pain DT          Prior Living Environment      Most Recent Value   Lives With  spouse   Living Arrangements  house [2SH]   Living Environment Comment  Pt lives with wife Malka,   Location, Kitchen  first (main) floor   Kitchen Access Comment  wife performs cooking tasks   Location, Laundry Room  second floor, must negotiate stairs to access   Laundry Room Access Comment  wife performs laundry (pt reports he assists at times)   Location, Patient Bedroom  second floor, must negotiate stairs to access   Location, Bathroom  second floor, must negotiate stairs to access   Bathroom Access  Comment  walk-in shower s GB, standard height toilet          Prior Level of Function      Most Recent Value   Dominant Hand  right   Ambulation  independent   Transferring  independent   Toileting  independent   Bathing  independent   Dressing  independent   Eating  independent   Prior Level of Function Comment  Independent PTA. (+) . Enjoys golf and household projects.   Assistive Device/Animal Currently Used at Home  none          IRF OT Evaluation and Treatment - 08/24/20 1103        Time Calculation    Start Time  1100     Stop Time  1200     Time Calculation (min)  60 min        Session Details    Document Type  daily treatment/progress note     Mode of Treatment  individual therapy;occupational therapy        Vision Assessment/Intervention    Brain Injury Visual Assessment Battery for Adults (biVABA)  Pupil: L pupil larger than R, round. Reactivity intact, Ocular ROM: intact saccadic intrusions, Convergence intact. Confrontation testing: WFL, Extinguish test: WFL. Alignment L intact. Saccades: undershooting         Transfers    Comment  Pt engaged in sit to stand transitions focusing on WBing through LLE, required MIN A to complete.         Sit to Stand Transfer    [place], Sit to Stand Transfer  touching/steadying assist     Verbal Cues  safety     Assistive Device  walker, front-wheeled        Stand to Sit Transfer    [place], Stand to Sit Transfer  touching/steadying assist     Verbal Cues  safety     Assistive Device  walker, front-wheeled        Stand Pivot Transfer    [place], Stand Pivot/Stand Step Transfer  minimum assist (75% or more patient effort)     Verbal Cues  safety;proper use of assistive device     Assistive Device  walker, front-wheeled     Comment  ambulatory approach        Safety Issues, Functional Mobility    Comment, Safety Issues/Impairments (Mobility)  MIN A ambulating c RW on unit, required cues to  decrease speed        Community Mobility (IADLs)    Community  Mobility  pre-driving assessment        Pre-Driving Assessment    Cognition Issues  Pt completed MoCA 7.1 scoring 24/30. (WFL is greater or equal to 26/30. Pt noted with deficits in language (2/3), delayed recall (5/6).         Daily Progress Summary (OT)    Daily Outcome Statement (OT)  Further assessments completed, noted with saccadic instrusions during pursuits and impaired delayed recall. Required cues for appropriate speed during HHM.                            IRF OT Goals      Most Recent Value   Transfer Goal 1   Activity/Assistive Device  toilet at 08/22/2020 0704   [place]  moderate assist (50-74% patient effort) at 08/22/2020 0704   Time Frame  short-term goal (STG), 1 week at 08/22/2020 0704   Transfer Goal 2   Activity/Assistive Device  toilet at 08/22/2020 0704   [place]  modified independence at 08/22/2020 0704   Time Frame  long-term goal (LTG), 4 weeks at 08/22/2020 0704   Transfer Goal 3   Activity/Assistive Device  shower at 08/22/2020 0704   [place]  minimum assist (75% or more patient effort) at 08/22/2020 0704   Time Frame  short-term goal (STG), 1 week at 08/22/2020 0704   Transfer Goal 4   Activity/Assistive Device  shower at 08/22/2020 0704   [place]  modified independence at 08/22/2020 0704   Time Frame  long-term goal (LTG), 4 weeks at 08/22/2020 0704   Bathing Goal 1   Activity/Assistive Device  bathing skills, all at 08/22/2020 0704   [place]  other (see comments) [touching/steadying assist] at 08/22/2020 0704   Time Frame  short-term goal (STG), 1 week at 08/22/2020 0704   Bathing Goal 2   Activity/Assistive Device  bathing skills, all at 08/22/2020 0704   [place]  modified independence at 08/22/2020 0704   Time Frame  long-term goal (LTG), 4 weeks at 08/22/2020 0704   UB Dressing Goal 1   Activity/Assistive Device  upper body dressing at 08/22/2020 0704   [place]  minimum assist (75% or more patient effort) at 08/22/2020 0704   Time Frame  short-term  goal (STG), 1 week at 08/22/2020 0704   UB Dressing Goal 2   Activity/Assistive Device  upper body dressing at 08/22/2020 0704   [place]  modified independence at 08/22/2020 0704   Time Frame  long-term goal (LTG), 4 weeks at 08/22/2020 0704   LB Dressing Goal 1   Activity/Assistive Device  lower body dressing at 08/22/2020 0704   [place]  moderate assist (50-74% patient effort) at 08/22/2020 0704   Time Frame  short-term goal (STG), 1 week at 08/22/2020 0704   LB Dressing Goal 2   Activity/Assistive Device  lower body dressing at 08/22/2020 0704   [place]  modified independence at 08/22/2020 0704   Time Frame  long-term goal (LTG), 4 weeks at 08/22/2020 0704   Grooming Goal 1   Activity/Assistive Device  grooming skills, all at 08/22/2020 0704   [place]  minimum assist (75% or more patient effort) at 08/22/2020 0704   Time Frame  short-term goal (STG), 1 week at 08/22/2020 0704   Grooming Goal 2   Activity/Assistive Device  grooming skills, all at 08/22/2020 0704   [place]  modified independence at 08/22/2020 0704   Time Frame  long-term goal (LTG), 4 weeks at 08/22/2020 0704   Toileting Goal 1   Activity/Assistive Device  toileting skills, all at 08/22/2020 0704   [place]  minimum assist (75% or more patient effort) at 08/22/2020 0704   Time Frame  short-term goal (STG), 1 week at 08/22/2020 0704   Toileting Goal 2   Activity/Assistive Device  toileting skills, all at 08/22/2020 0704   [place]  modified independence at 08/22/2020 0704   Time Frame  long-term goal (LTG), 4 weeks at 08/22/2020 0704

## 2020-08-24 NOTE — SUBJECTIVE & OBJECTIVE
Patient was seen and examined.   Attestation Notes: Face to face encounter completed    Subjective     Interval History:   Patient seen this morning sitting in his wheelchair eating breakfast with no acute complaints and no acute events overnight.  Patient says he has been eating well and that his ankles has been weak but getting better.  Nursing staff does not endorse any acute events.  Patient denies cough, fever, shortness of breath, chills no nausea no vomiting no diarrhea no constipation no numbness no tingling.    Objective     Vital signs in last 24 hours:  Temp:  [36.3 °C (97.3 °F)-36.7 °C (98.1 °F)] 36.3 °C (97.3 °F)  Heart Rate:  [44-57] 57  Resp:  [16-18] 18  BP: (111-144)/(61-71) 119/67    No intake or output data in the 24 hours ending 08/24/20 1337  Intake/Output this shift:  No intake/output data recorded.    Review of Systems:  All other systems reviewed and negative except as noted in the HPI.    Labs  No new labs.    Imaging  Not applicable    VTE Assessment: TBD    Full Code    Physical Exam   Constitutional: He is oriented to person, place, and time. Vital signs are normal. He appears well-developed and well-nourished.   HENT:   Head: Normocephalic.       Eyes: Pupils are equal, round, and reactive to light. Conjunctivae and EOM are normal.   Cardiovascular: Normal rate and regular rhythm.   Pulmonary/Chest: Effort normal and breath sounds normal.   Abdominal: Soft. Normal appearance and bowel sounds are normal.   Genitourinary:   Genitourinary Comments: No purvis catheter   Musculoskeletal:        Right hand: He exhibits decreased range of motion.   No jt erythema, warmth or effusion; decreased AROM R hand (which pt states is chronic related to cervical radiculopathy)   Neurological: He is alert and oriented to person, place, and time. He displays no tremor. No cranial nerve deficit or sensory deficit. He exhibits normal muscle tone. He displays no seizure activity. Coordination abnormal.    Fluent, follows commands, facial muscle symmetric, tongue midline protrusion, sensation light touch intact, strength testing reveals 5/5 strength deltoid, biceps, triceps, wrist extensors, wrist flexors, and 4/5 strength finger flexors, 3+/5 strength dorsal interossei right upper extremity; 5/5 strength throughout right lower extremity; 4/5 strength throughout left upper extremity, 3+/5 strength hip flexors, knee flexors, knee extensors, 2/5 strength ankle plantar flexors, 0/5 strength dorsiflexors, 1/5 strength toe extensors left lower extremity.  Tone 0/4.  Impaired coordination left side, fix left upper extremity on rapid alternating movement.   Skin: Skin is warm, dry and intact.   Psychiatric: He has a normal mood and affect. His speech is normal and behavior is normal.       Plan of care was discussed with patient

## 2020-08-24 NOTE — PLAN OF CARE
Problem: Rehabilitation (IRF) Plan of Care  Goal: Plan of Care Review  Outcome: Progressing  Flowsheets (Taken 8/24/2020 0154)  Plan of Care Reviewed With: patient  IRF Plan of Care Review: progress ongoing, continue  Outcome Summary: Patient sleeping well overnight. Patient did not have any complaints of pain or discomfort. Patient reportedly has left foot drop, pitting edema noticed on that foot. Continent of bladder. SRx4 maintained. Will continue to monitor.

## 2020-08-24 NOTE — PROGRESS NOTES
Patient: Guy Jarrett  Location: [facility] [address]  MRN: [id number]  Today's date: 8/24/2020    History of Present Illness  Guy is a 70 y.o. male admitted on 8/21/2020 with Brain metastases (CMS/HCC).     Pt is a 71 y/o male admitted to [facility] on 8/21/20 s/p craniotomy and mass resection. Pt with known metastatic melanoma diagnosed in August 2019 receiving chemotherapy diagnosed with brain metastasis on 4/22/2020 in the right temporal lobe treated with gamma knife on 5/5/2020 found to have rapidly enlarging right frontal metastases on subsequent surveillance imaging.  He was admitted to [facility] on 8/18/2020.  Dr. Barrera from neurosurgery was consulted and performed craniotomy and mass resection.    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    PT Vitals    Date/Time BP BP Location BP Method Pt Position [facility]   08/24/20 1005 144/65 Left upper arm Automatic Sitting LB      PT Pain    Date/Time Rating: Rest [facility]   08/24/20 1005 0 - no pain LB   08/24/20 1028 0 - no pain LB          Prior Living Environment      Most Recent Value   Lives With  spouse   Living Arrangements  house [2SH]   Living Environment Comment  Pt lives with wife Malka,   Location, Kitchen  first (main) floor   Kitchen Access Comment  wife performs cooking tasks   Location, Laundry Room  second floor, must negotiate stairs to access   Laundry Room Access Comment  wife performs laundry (pt reports he assists at times)   Location, Patient Bedroom  second floor, must negotiate stairs to access   Location, Bathroom  second floor, must negotiate stairs to access   Bathroom Access Comment  walk-in shower s GB, standard height toilet          Prior Level of Function      Most Recent Value   Dominant Hand  right   Ambulation  independent   Transferring  independent    Toileting  independent   Bathing  independent   Dressing  independent   Eating  independent   Prior Level of Function Comment  Independent PTA. (+) . Enjoys golf and household projects.   Assistive Device/Animal Currently Used at Home  none          IRF PT Evaluation and Treatment - 08/24/20 1018        Time Calculation    Start Time  1000     Stop Time  1030     Time Calculation (min)  30 min        Session Details    Document Type  daily treatment/progress note     Mode of Treatment  physical therapy;individual therapy        Sit to Stand Transfer    [place], Sit to Stand Transfer  touching/steadying assist;verbal cues     Verbal Cues  safety     Assistive Device  walker, front-wheeled        Stand to Sit Transfer    [place], Stand to Sit Transfer  touching/steadying assist;verbal cues     Verbal Cues  preparatory posture;safety     Assistive Device  walker, front-wheeled        Stand Pivot Transfer    [place], Stand Pivot/Stand Step Transfer  touching/steadying assist;verbal cues     Verbal Cues  safety     Assistive Device  walker, front-wheeled     Comment  ambulatory approach        Gait Training    [place], Gait  touching/steadying assist;verbal cues     Assistive Device  walker, front-wheeled     Distance in Feet  200 feet     Deviations/Abnormal Patterns (Gait)  gait speed decreased;stride length decreased     Left Sided Gait Deviations  heel strike decreased     Comment  VC for upright posture, safe speed, consistent heel strike with good carryover        Balance    Balance Test Results  Estrella Balance Scale        Estrella Balance Scale    Sitting to Standing  Able to stand without using hands and stabilize independently     Standing Unsupported  Able to stand safely for 2 minutes     Sitting with Back Unsupported but Feet Supported on Floor or on a Stool  Able to sit safely and securely for 2 minutes     Standing to Sitting  Sits safely with minimal use of hands     Transfers  Able  to transfer with verbal cueing and/or supervision     Standing Unsupported with Eyes Closed  Able to stand 10 seconds safely     Standing Unsupported with Feet Together  Able to place feet together independently and stand 1 minute with supervision     Reach Forward with Outstretched Arm While Standing  Reaches forward but needs supervision      Object from Floor from a Standing Position  Able to  slipper but needs supervision     Turning to Look Behind Over Left and Right Shoulders While Standing  Needs supervision when turning     Turn 360 Degrees  Needs close supervision or verbal cueing     Place Alternate Foot on Step or Stool While Standing Unsupported  Able to complete greater than 2 steps needs minimal assist     Standing Unsupported One Foot in Front  Able to place foot ahead independently and hold 30 seconds     Standing on One Leg  Tries to lift leg unable to hold 3 seconds but remains standing independently     Estrella Balance Score  36        Daily Progress Summary (PT)    Daily Outcome Statement (PT)  Patient scored 36/56 on Estrella Balance Scale, indicating increased fall risk. Will benefit from continued LE strengthening and mobility training to maximize safety/independence.                IRF PT Goals      Most Recent Value   Bed Mobility Goal 1   Activity/Assistive Device  sit to supine/supine to sit, rolling to left, rolling to right at 08/22/2020 1100   [place]  supervision required at 08/22/2020 1100   Time Frame  short-term goal (STG), 1 week at 08/22/2020 1100   Bed Mobility Goal 2   Activity/Assistive Device  sit to supine/supine to sit, rolling to left, rolling to right at 08/22/2020 1100   [place]  modified independence at 08/22/2020 1100   Time Frame  long-term goal (LTG), 21 days or less at 08/22/2020 1100   Transfer Goal 1   Activity/Assistive Device  sit-to-stand/stand-to-sit, bed-to-chair/chair-to-bed, stand pivot at 08/22/2020 1100   [place]  minimum assist (75%  or more patient effort), verbal cues required at 08/22/2020 1100   Time Frame  short-term goal (STG), 1 week at 08/22/2020 1100   Transfer Goal 2   Activity/Assistive Device  sit-to-stand/stand-to-sit, bed-to-chair/chair-to-bed, car transfer, stand pivot at 08/22/2020 1100   [place]  modified independence at 08/22/2020 1100   Time Frame  long-term goal (LTG), 21 days or less at 08/22/2020 1100   Gait/Walking Locomotion Goal 1   Activity/Assistive Device  gait (walking locomotion) at 08/22/2020 1100   Distance  150 feet at 08/22/2020 1100   [place]  minimum assist (75% or more patient effort), verbal cues required at 08/22/2020 1100   Time Frame  short-term goal (STG), 1 week at 08/22/2020 1100   Gait/Walking Locomotion Goal 2   Activity/Assistive Device  gait (walking locomotion) at 08/22/2020 1100   Distance  200 feet at 08/22/2020 1100   [place]  modified independence at 08/22/2020 1100   Time Frame  long-term goal (LTG), 21 days or less at 08/22/2020 1100   Stairs Goal 1   Activity/Assistive Device  ascending stairs, descending stairs, using handrail, right, step-to-step at 08/22/2020 1100   Number of Stairs  8 at 08/22/2020 1100   [place]  minimum assist (75% or more patient effort), verbal cues required at 08/22/2020 1100   Time Frame  short-term goal (STG), 1 week at 08/22/2020 1100   Stairs Goal 2   Activity/Assistive Device  ascending stairs, descending stairs, using handrail, right, step-to-step at 08/22/2020 1100   Number of Stairs  12 at 08/22/2020 1100   [place]  modified independence at 08/22/2020 1100   Time Frame  long-term goal (LTG), 21 days or less at 08/22/2020 1100

## 2020-08-24 NOTE — ASSESSMENT & PLAN NOTE
Patient experienced intraoperative seizure.  Long-term monitoring postoperative EEG showed no epileptiform discharges but did show diffuse slowing.  Continue levetiracetam.  Continue per Dr. Barrera.

## 2020-08-24 NOTE — PROGRESS NOTES
Patient: Guy Jarrett  Location: [facility] [address]  MRN: [id number]  Today's date: 8/24/2020    Hospital Course  History of Present Illness  Guy is a 70 y.o. male admitted on 8/21/2020 with Brain metastases (CMS/HCC).     Pt is a 69 y/o male admitted to [facility] on 8/21/20 s/p craniotomy and mass resection. Pt with known metastatic melanoma diagnosed in August 2019 receiving chemotherapy diagnosed with brain metastasis on 4/22/2020 in the right temporal lobe treated with gamma knife on 5/5/2020 found to have rapidly enlarging right frontal metastases on subsequent surveillance imaging.  He was admitted to [facility] on 8/18/2020.  Dr. Barrera from neurosurgery was consulted and performed craniotomy and mass resection.    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    Therapy Pain/Vitals - 08/24/20 0901        Pain/Comfort/Sleep    Pain Charting Type  Pain Assessment     Preferred Pain Scale  word (verbal rating pain scale)     Pain Rating (word): Rest  0 - no pain                 Recreational Therapy Evaluation and Treatment - 08/24/20 0901        Session Details    Document Type  initial evaluation     Mode of Treatment  individual therapy;recreational therapy        Time Calculation    Start Time  0900     Stop Time  0930     Time Calculation (min)  30 min        General Information    Patient Profile Reviewed?  yes     Existing Precautions/Restrictions  fall        Coping/Community Reintegration    Observed Emotional State  accepting     Verbalized Emotional State  acceptance        Coping Strategies    Counseling (Coping Strategies)  goal setting facilitated;involvement in care planning encouraged     Therapeutic Recreational Activities (Coping Strategies)  reading    golf,drive sports car,activities c grandchildren    Quality  of Life Promotion (Coping Strategies)  balance between activity/rest encouraged;independence in all possible areas promoted        Community Reintegration    Activities  integration into community life    vac[place] home in [place]    Barriers  type of disability        Rec Therapy Clinical Impression    Patient's Goals for Discharge  take care of myself at home;return to all previous roles/activities     Plan For Care Reviewed: Recreational Therapy  Recreational Therapy plan for care discussed with patient     Functional Limitations in Following Categories  self-care;home management;community/leisure;school     Rehab Potential/Prognosis  good, to achieve stated therapy goals     Rec Therapy Frequency of Treatment  60-90 minutes per session;3-5 times per week;30 minutes per session     Activity Limitations Related to Problem List  functional mobility not performed adequately or safely for community activity     Daily Outcome Statement  RT evaluation completed               Rec Care Plan Goals      Most Recent Value   Leisure Goal, Recreation   Recreation STG Activity: Leisure  Participate in RT tasks while integrating LUE   Recreation STG: Leisure  supervision required   Recreation STG Date Established: Leisure  08/24/20   Recreation STG Duration: Leisure  10 days or less   Recreation LTG Activity: Leisure  Participate in RT tasks while improving LUE FMC   Recreation LTG: Leisure  supervision required   Recreation LTG Date Established: Leisure  08/24/20   Recreation LTG Duration: Leisure  21 days or less   Additional Goal, Recreation   Recreation STG Activity: Additional Goal  Participate in RT tasks while standing for 10' c min A   Recreation STG: Additional  minimum assist (75% or less patient effort)   Recreation STG Date Established: Additional  08/24/20   Recreation STG Duration: Additional  7 days or less   Recreation LTG Activity: Additional Goal  Participate in RT tasks while standing for 15' c close S    Recreation LTG: Additional  supervision required   Recreation LTG Date Established: Additional  08/24/20   Recreation LTG Duration: Additional  21 days or less

## 2020-08-24 NOTE — PLAN OF CARE
Problem: Rehabilitation (IRF) Plan of Care  Goal: Plan of Care Review  Outcome: Progressing  Flowsheets (Taken 8/24/2020 0915)  Plan of Care Reviewed With: patient  IRF Plan of Care Review: other (see comments)  Outcome Summary: RT evaluation completed

## 2020-08-24 NOTE — PLAN OF CARE
Problem: Rehabilitation (IRF) Plan of Care  Goal: Plan of Care Review  Outcome: Progressing  Flowsheets (Taken 8/24/2020 1034)  Plan of Care Reviewed With: patient  IRF Plan of Care Review: progress ongoing, continue  Outcome Summary: patient remains free from falls, side rails x's 4 maintained, no complaints of pain

## 2020-08-24 NOTE — PROGRESS NOTES
Daily Progress Note    Patient was seen and examined.   Attestation Notes: Face to face encounter completed    Subjective     Interval History:   Patient seen this morning sitting in his wheelchair eating breakfast with no acute complaints and no acute events overnight.  Patient says he has been eating well and that his ankles has been weak but getting better.  Nursing staff does not endorse any acute events.  Patient denies cough, fever, shortness of breath, chills no nausea no vomiting no diarrhea no constipation no numbness no tingling.    Objective     Vital signs in last 24 hours:  Temp:  [36.3 °C (97.3 °F)-36.7 °C (98.1 °F)] 36.3 °C (97.3 °F)  Heart Rate:  [44-57] 57  Resp:  [16-18] 18  BP: (111-144)/(61-71) 119/67    No intake or output data in the 24 hours ending 08/24/20 1337  Intake/Output this shift:  No intake/output data recorded.    Review of Systems:  All other systems reviewed and negative except as noted in the HPI.    Labs  No new labs.    Imaging  Not applicable    VTE Assessment: TBD    Full Code    Physical Exam   Constitutional: He is oriented to person, place, and time. Vital signs are normal. He appears well-developed and well-nourished.   HENT:   Head: Normocephalic.       Eyes: Pupils are equal, round, and reactive to light. Conjunctivae and EOM are normal.   Cardiovascular: Normal rate and regular rhythm.   Pulmonary/Chest: Effort normal and breath sounds normal.   Abdominal: Soft. Normal appearance and bowel sounds are normal.   Genitourinary:   Genitourinary Comments: No purvis catheter   Musculoskeletal:        Right hand: He exhibits decreased range of motion.   No jt erythema, warmth or effusion; decreased AROM R hand (which pt states is chronic related to cervical radiculopathy)   Neurological: He is alert and oriented to person, place, and time. He displays no tremor. No cranial nerve deficit or sensory deficit. He exhibits normal muscle tone. He displays no seizure activity.  Requesting: Atorvastatin 10mg  LOV: 2/10/18  RTC: -  Last Relevant Labs: 2/13/18  Filled: 2/10/18 #90 with 0 refills    Future Appointments  Date Time Provider Amari Evans   5/16/2018 2:40 PM Santa Figueroa MD [facility] Coordination abnormal.   Fluent, follows commands, facial muscle symmetric, tongue midline protrusion, sensation light touch intact, strength testing reveals 5/5 strength deltoid, biceps, triceps, wrist extensors, wrist flexors, and 4/5 strength finger flexors, 3+/5 strength dorsal interossei right upper extremity; 5/5 strength throughout right lower extremity; 4/5 strength throughout left upper extremity, 3+/5 strength hip flexors, knee flexors, knee extensors, 2/5 strength ankle plantar flexors, 0/5 strength dorsiflexors, 1/5 strength toe extensors left lower extremity.  Tone 0/4.  Impaired coordination left side, fix left upper extremity on rapid alternating movement.   Skin: Skin is warm, dry and intact.   Psychiatric: He has a normal mood and affect. His speech is normal and behavior is normal.       Plan of care was discussed with patient    Assessment & Plan  Follow up  Assessment & Plan  PCP after discharge  Neurosurgeon Dr. Beau Barrera at [facility] in 4 to 6 weeks [phone].    Oncology NP Chyna Pugh 9/15/2020 10am [phone] for next round of chemo.    Pain  Assessment & Plan  Tylenol, Tylenol 3 as needed    Seizure disorder (CMS/HCC)  Assessment & Plan  Continue Keppra    Risk for falls  Assessment & Plan  Wean off restraints as able    At high risk for pressure injury of skin  Assessment & Plan  Turns every 2 hours, Desitin to sacral area    At high risk for deep venous thrombosis  Assessment & Plan  SC heparin    Hyperlipidemia  Assessment & Plan  Continue lipitor    HTN (hypertension)  Assessment & Plan  Continue lisinopril    Metastatic melanoma (CMS/HCC)  Assessment & Plan  Mr. Jarrett is a 70-year-old male with known metastatic melanoma diagnosed in August 2019 receiving chemotherapy diagnosed with brain metastasis on 4/22/2020 in the right temporal lobe treated with gamma knife on 5/5/2020 found to have rapidly enlarging right frontal metastases on subsequent surveillance imaging.  He was  admitted to [facility] on 8/18/2020.  Dr. Barrera from neurosurgery was consulted and performed craniotomy and mass resection.  He was started on Keppra for intraoperative seizure.  Postoperatively he was started on Decadron taper.   He was cleared to initiate heparin for DVT prophylaxis.   Next dose Nivolumab due 9/15/20.  He was ultimately determined to medically stable for transfer to [facility] on 8/21/2020 for an acute inpatient rehabilitation program to address deficits related to metastatic melanoma with brain metastases status post craniotomy and resection.    He is weightbearing as tolerated.  He will participate in 3 hours of physical therapy, Occupational Therapy, and speech therapy as well as evaluation by psychology and 24-hour rehab nursing care.  We will monitor his scalp incision daily.  Remove scalp incision staples by 9/1/2020.  He will follow-up with his surgeon Dr. Barrera at [facility] in 4 to 6 weeks [phone].  Follow-up with his oncology NP Chyna Pugh 9/15/2020 10am [phone] for next round of chemo.        Expected Discharge Date:        Dr. Kapil Hidalgo, PGY-2  Case discussed with TG Ambrose

## 2020-08-24 NOTE — PROGRESS NOTES
Patient: Guy Jarrett  Location: [facility] [address]  MRN: [id number]  Today's date: 8/24/2020    History of Present Illness  Guy is a 70 y.o. male admitted on 8/21/2020 with Brain metastases (CMS/HCC).     Pt is a 71 y/o male admitted to [facility] on 8/21/20 s/p craniotomy and mass resection. Pt with known metastatic melanoma diagnosed in August 2019 receiving chemotherapy diagnosed with brain metastasis on 4/22/2020 in the right temporal lobe treated with gamma knife on 5/5/2020 found to have rapidly enlarging right frontal metastases on subsequent surveillance imaging.  He was admitted to [facility] on 8/18/2020.  Dr. Barrera from neurosurgery was consulted and performed craniotomy and mass resection.    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    PT Vitals    Date/Time Pulse BP BP Location BP Method Pt Position [facility]   08/24/20 1305 66 135/68 Left upper arm Automatic Sitting LB      PT Pain    Date/Time Rating: Rest [facility]   08/24/20 1305 0 - no pain LB   08/24/20 1355 0 - no pain LB          Prior Living Environment      Most Recent Value   Lives With  spouse   Living Arrangements  house [2SH]   Living Environment Comment  Pt lives with wife Malka,   Location, Kitchen  first (main) floor   Kitchen Access Comment  wife performs cooking tasks   Location, Laundry Room  second floor, must negotiate stairs to access   Laundry Room Access Comment  wife performs laundry (pt reports he assists at times)   Location, Patient Bedroom  second floor, must negotiate stairs to access   Location, Bathroom  second floor, must negotiate stairs to access   Bathroom Access Comment  walk-in shower s GB, standard height toilet          Prior Level of Function      Most Recent Value   Dominant Hand  right   Ambulation  independent   Transferring   independent   Toileting  independent   Bathing  independent   Dressing  independent   Eating  independent   Prior Level of Function Comment  Independent PTA. (+) . Enjoys golf and household projects.   Assistive Device/Animal Currently Used at Home  none          IRF PT Evaluation and Treatment - 08/24/20 1328        Time Calculation    Start Time  1300     Stop Time  1400     Time Calculation (min)  60 min        Session Details    Document Type  daily treatment/progress note     Mode of Treatment  physical therapy;individual therapy        Bed Mobility    [place], Roll Right  supervision     [place], Supine to Sit  supervision     [place], Sit to Supine  supervision     Comment (Bed Mobility)  on mat table        Sit to Stand Transfer    [place], Sit to Stand Transfer  touching/steadying assist;verbal cues     Verbal Cues  safety     Assistive Device  walker, front-wheeled        Stand to Sit Transfer    [place], Stand to Sit Transfer  touching/steadying assist;verbal cues     Verbal Cues  safety     Assistive Device  walker, front-wheeled        Stand Pivot Transfer    [place], Stand Pivot/Stand Step Transfer  touching/steadying assist;verbal cues     Verbal Cues  safety     Assistive Device  walker, front-wheeled     Comment  ambulatory approach        Gait Training    [place], Gait  minimum assist (75% or more patient effort);verbal cues     Assistive Device  none     Distance in Feet  200 feet     Deviations/Abnormal Patterns (Gait)  gait speed decreased;stride length decreased     Left Sided Gait Deviations  heel strike decreased     Comment  without AD: 200' Min A for WS and balance; with RW: 400', 200' with Cl S        Lower Extremity (Therapeutic Exercise)    Exercise Position/Type (LE Therapeutic Exercise)  supine;side lying;standing     Comment (LE Therapeutic Exercise)  SUPINE/SIDELYING: Bridges 10x3, L heelslides (AAROM to maintain neutral rotational control)  "10x3, sidelying L hip ER 10x3, SAQ LLE only 10x3   STANDING: Sit to/from stand biasing LLE (RLE on 4\" step then on foam) 10x2 reps; heel raises 10x2; step-ups 4\" step LLE 15x2. VC/education throughout for quality and speed of movement, maintaining proper form and taking rest breaks when fatigued.         Daily Progress Summary (PT)    Daily Outcome Statement (PT)  Patient demonstrates increased independence during gait without AD vs at initial evaluation. Continues to demo emerging LLE strength, currently most impaired at hip and ankle. Continue strengthening, balance, and mobility training to maximize independence.              IRF PT Goals      Most Recent Value   Bed Mobility Goal 1   Activity/Assistive Device  sit to supine/supine to sit, rolling to left, rolling to right at 08/22/2020 1100   [place]  supervision required at 08/22/2020 1100   Time Frame  short-term goal (STG), 1 week at 08/22/2020 1100   Bed Mobility Goal 2   Activity/Assistive Device  sit to supine/supine to sit, rolling to left, rolling to right at 08/22/2020 1100   [place]  modified independence at 08/22/2020 1100   Time Frame  long-term goal (LTG), 21 days or less at 08/22/2020 1100   Transfer Goal 1   Activity/Assistive Device  sit-to-stand/stand-to-sit, bed-to-chair/chair-to-bed, stand pivot at 08/22/2020 1100   [place]  minimum assist (75% or more patient effort), verbal cues required at 08/22/2020 1100   Time Frame  short-term goal (STG), 1 week at 08/22/2020 1100   Transfer Goal 2   Activity/Assistive Device  sit-to-stand/stand-to-sit, bed-to-chair/chair-to-bed, car transfer, stand pivot at 08/22/2020 1100   [place]  modified independence at 08/22/2020 1100   Time Frame  long-term goal (LTG), 21 days or less at 08/22/2020 1100   Gait/Walking Locomotion Goal 1   Activity/Assistive Device  gait (walking locomotion) at 08/22/2020 1100   Distance  150 feet at 08/22/2020 1100   [place]  minimum assist (75% or more " patient effort), verbal cues required at 08/22/2020 1100   Time Frame  short-term goal (STG), 1 week at 08/22/2020 1100   Gait/Walking Locomotion Goal 2   Activity/Assistive Device  gait (walking locomotion) at 08/22/2020 1100   Distance  200 feet at 08/22/2020 1100   [place]  modified independence at 08/22/2020 1100   Time Frame  long-term goal (LTG), 21 days or less at 08/22/2020 1100   Stairs Goal 1   Activity/Assistive Device  ascending stairs, descending stairs, using handrail, right, step-to-step at 08/22/2020 1100   Number of Stairs  8 at 08/22/2020 1100   [place]  minimum assist (75% or more patient effort), verbal cues required at 08/22/2020 1100   Time Frame  short-term goal (STG), 1 week at 08/22/2020 1100   Stairs Goal 2   Activity/Assistive Device  ascending stairs, descending stairs, using handrail, right, step-to-step at 08/22/2020 1100   Number of Stairs  12 at 08/22/2020 1100   [place]  modified independence at 08/22/2020 1100   Time Frame  long-term goal (LTG), 21 days or less at 08/22/2020 1100

## 2020-08-25 ENCOUNTER — APPOINTMENT (INPATIENT)
Dept: OCCUPATIONAL THERAPY | Facility: REHABILITATION | Age: 70
DRG: 949 | End: 2020-08-25
Payer: COMMERCIAL

## 2020-08-25 ENCOUNTER — APPOINTMENT (INPATIENT)
Dept: PHYSICAL THERAPY | Facility: REHABILITATION | Age: 70
DRG: 949 | End: 2020-08-25
Payer: COMMERCIAL

## 2020-08-25 LAB
BACTERIA URNS QL MICRO: ABNORMAL /HPF
BILIRUB UR QL STRIP.AUTO: NEGATIVE MG/DL
CLARITY UR REFRACT.AUTO: ABNORMAL
COLOR UR AUTO: YELLOW
GLUCOSE BLD-MCNC: 112 MG/DL (ref 70–99)
GLUCOSE BLD-MCNC: 133 MG/DL (ref 70–99)
GLUCOSE BLD-MCNC: 138 MG/DL (ref 70–99)
GLUCOSE BLD-MCNC: 159 MG/DL (ref 70–99)
GLUCOSE UR STRIP.AUTO-MCNC: NEGATIVE MG/DL
HGB UR QL STRIP.AUTO: NEGATIVE
HYALINE CASTS #/AREA URNS LPF: ABNORMAL /LPF
KETONES UR STRIP.AUTO-MCNC: NEGATIVE MG/DL
LEUKOCYTE ESTERASE UR QL STRIP.AUTO: NEGATIVE
NITRITE UR QL STRIP.AUTO: NEGATIVE
PH UR STRIP.AUTO: 5.5 [PH]
POCT TEST: ABNORMAL
PROT UR QL STRIP.AUTO: NEGATIVE
RBC #/AREA URNS HPF: ABNORMAL /HPF
SP GR UR REFRACT.AUTO: 1.03
SQUAMOUS URNS QL MICRO: ABNORMAL /HPF
UROBILINOGEN UR STRIP-ACNC: 0.2 EU/DL
WBC #/AREA URNS HPF: ABNORMAL /HPF

## 2020-08-25 PROCEDURE — 63700000 HC SELF-ADMINISTRABLE DRUG: Performed by: HOSPITALIST

## 2020-08-25 PROCEDURE — 63600000 HC DRUGS/DETAIL CODE: Performed by: PHYSICAL MEDICINE & REHABILITATION

## 2020-08-25 PROCEDURE — 81001 URINALYSIS AUTO W/SCOPE: CPT | Performed by: HOSPITALIST

## 2020-08-25 PROCEDURE — 12800001 HC ROOM AND CARE SEMIPRIVATE REHAB-BRAIN INJ

## 2020-08-25 PROCEDURE — 97110 THERAPEUTIC EXERCISES: CPT | Mod: GP

## 2020-08-25 PROCEDURE — 97530 THERAPEUTIC ACTIVITIES: CPT | Mod: GP

## 2020-08-25 PROCEDURE — 97112 NEUROMUSCULAR REEDUCATION: CPT | Mod: GP,CQ

## 2020-08-25 PROCEDURE — 63700000 HC SELF-ADMINISTRABLE DRUG: Performed by: PHYSICAL MEDICINE & REHABILITATION

## 2020-08-25 PROCEDURE — 97116 GAIT TRAINING THERAPY: CPT | Mod: GP,CQ

## 2020-08-25 PROCEDURE — 63600000 HC DRUGS/DETAIL CODE: Performed by: STUDENT IN AN ORGANIZED HEALTH CARE EDUCATION/TRAINING PROGRAM

## 2020-08-25 PROCEDURE — 97530 THERAPEUTIC ACTIVITIES: CPT | Mod: GO

## 2020-08-25 RX ADMIN — DEXAMETHASONE 4 MG: 4 TABLET ORAL at 12:18

## 2020-08-25 RX ADMIN — HEPARIN SODIUM 5000 UNITS: 5000 INJECTION, SOLUTION INTRAVENOUS; SUBCUTANEOUS at 14:05

## 2020-08-25 RX ADMIN — LISINOPRIL 10 MG: 10 TABLET ORAL at 07:51

## 2020-08-25 RX ADMIN — DEXAMETHASONE 4 MG: 4 TABLET ORAL at 05:39

## 2020-08-25 RX ADMIN — PANTOPRAZOLE SODIUM 40 MG: 40 TABLET, DELAYED RELEASE ORAL at 07:50

## 2020-08-25 RX ADMIN — HEPARIN SODIUM 5000 UNITS: 5000 INJECTION, SOLUTION INTRAVENOUS; SUBCUTANEOUS at 20:23

## 2020-08-25 RX ADMIN — DEXAMETHASONE 4 MG: 4 TABLET ORAL at 20:23

## 2020-08-25 RX ADMIN — LEVETIRACETAM 1000 MG: 500 TABLET ORAL at 20:23

## 2020-08-25 RX ADMIN — HEPARIN SODIUM 5000 UNITS: 5000 INJECTION, SOLUTION INTRAVENOUS; SUBCUTANEOUS at 05:39

## 2020-08-25 RX ADMIN — ATORVASTATIN CALCIUM 10 MG: 10 TABLET, FILM COATED ORAL at 07:50

## 2020-08-25 RX ADMIN — LEVETIRACETAM 1000 MG: 500 TABLET ORAL at 07:49

## 2020-08-25 NOTE — PLAN OF CARE
Problem: Rehabilitation (IRF) Plan of Care  Goal: Plan of Care Review  Flowsheets (Taken 8/25/2020 7494)  Plan of Care Reviewed With: patient  IRF Plan of Care Review: progress ongoing, continue  Outcome Summary: Cl S with RW, Min A without AD for ambulation. PT focusing on LLE strengthening.

## 2020-08-25 NOTE — PLAN OF CARE
Problem: Rehabilitation (IRF) Plan of Care  Goal: Plan of Care Review  Outcome: Progressing  Flowsheets (Taken 8/25/2020 1235)  Plan of Care Reviewed With: patient  IRF Plan of Care Review: progress ongoing, continue  Outcome Summary: Pt engaged in overall strengthening and RUE coordination and strengthening. Issued built up handles to add to utensils to improve each during self-feeding and improve legibility.

## 2020-08-25 NOTE — PROGRESS NOTES
[facility] Internal Medicine Progress Note          Patient was seen and examined.     Guy Jarrett is a 70 y.o. right-handed male with past medical or significant for hypertension, nephrolithiasis, cervical disc disease, and metastatic melanoma, initially diagnosed August 2019, receiving immunotherapy with brain metastasis 4/20/2020 in the right temporal lobe treated with gamma knife on 5/5/2020, and found to have rapidly enlarging right frontal metastasis on surveillance imaging.  MRI of the MRI of the brain showed 2 cm centrally necrotic and or hemorrhagic mass lesion in the right posterior frontal lobe extending to the dural surface of the falx, likely involving supplementary motor cortex with marked surrounding vasogenic edema and local mass-effect without evidence of midline shift or herniation.  There was redemonstration of tiny treated right temporal metastasis significantly decreased in size when compared with treatment planning study. Patient noticed increasing gait imbalance and left-sided weakness while golfing and driving a stick shift as well.  He was [facility] to [facility] [place] [facility] 8/[facility]/2024 right craniotomy/tumor resection performed by Dr. Barrera.  Patient sustained a intraoperative seizure.  Levetiracetam dosage increase.  Long-term monitoring EEG showed no evidence of epileptiform discharges postoperatively.  Hospital course notable for increased left-sided weakness and foot drop.  Otherwise, uneventful.    Subjective      Patient will nursing issues overnight.  Slept well.  Denies chest pain, shortness breath, nausea vomiting, diarrhea or headaches.  Blood pressures well controlled.  Increasing independence with transfers and ambulation.  Patient remains interactive with therapies and continues to progress.    Objective     Vital signs in last 24 hours:  Temp:  [36.4 °C (97.5 °F)-36.6 °C (97.9 °F)] 36.4 °C (97.5 °F)  Heart Rate:  [49-66] 49  Resp:  [18] 18  BP: ()/(56-80) 126/65     Allergies    Allergen Reactions   • Penicillins        Medications:  • atorvastatin  10 mg oral Daily   • dexAMETHasone  4 mg oral q12h MALGORZATA    Followed by   • [START ON 8/30/2020] dexAMETHasone  2 mg oral q12h MALGORZATA   • [START ON 9/3/2020] dexAMETHasone  2 mg oral Daily   • heparin (porcine)  5,000 Units subcutaneous q8h MALGORZATA   • insulin aspart U-100  3-5 Units subcutaneous With meals & nightly   • levETIRAcetam  1,000 mg oral BID   • lisinopriL  10 mg oral Daily   • pantoprazole  40 mg oral Daily   • zinc oxide-cod liver oil  1 application Topical Daily         Objective      Full Code    Physical Exam  HEENT: PERRLA, EOMI, sclera icteric, mucous membranes dry, craniotomy incision with staples clean, dry and intact  Neck: Supple, JVP also 5 cm  Heart: Regular rhythm, no murmurs, rubs or gallops  Lungs: Clear auscultation  Abdomen: NABS, soft, nontender, no rebound, or guarding  Extremities: Trace pretibial edema  Neuro: Patient alert and oriented x3 with 4/5 left-sided weakness and discoordination      Lab Results   Component Value Date    GLUCOSE 143 (H) 08/22/2020    CALCIUM 8.6 (L) 08/22/2020     08/22/2020    K 4.1 08/22/2020    CO2 24 08/22/2020     08/22/2020    BUN 41 (H) 08/22/2020    CREATININE 1.0 08/22/2020     Lab Results   Component Value Date    WBC 12.85 (H) 08/22/2020    HGB 12.2 (L) 08/22/2020    HCT 38.2 (L) 08/22/2020    MCV 93.2 08/22/2020     08/22/2020     Assessment     Brain metastases (CMS/HCC)  Assessment & Plan  MRI of the MRI of the brain showed 2 cm centrally necrotic and or hemorrhagic mass lesion in the right posterior frontal lobe extending to the dural surface of the falx, likely involving supplementary motor cortex with marked surrounding vasogenic edema and local mass-effect without evidence of midline shift or herniation.  There was redemonstration of tiny treated right temporal metastasis significantly decreased in size when compared with treatment planning study.  Continue  Decadron taper and levetiracetam.  Follow-up immunotherapy 9/15/2020.        Seizure disorder (CMS/HCC)  Assessment & Plan  Patient experienced intraoperative seizure.  Long-term monitoring postoperative EEG showed no epileptiform discharges but did show diffuse slowing.  Continue levetiracetam.  Continue per Dr. Barrera.    HTN (hypertension)  Assessment & Plan  Follow orthostatic blood pressures on lisinopril.    Arthritis  Assessment & Plan  Exacerbated by immunotherapy.  Patient on home prednisone 10 mg daily.  Holding for now while on Decadron.    At high risk for deep venous thrombosis  Assessment & Plan  Continue subcutaneous Lovenox and SCDs.            Charly Bailey MD  8/25/2020

## 2020-08-25 NOTE — PLAN OF CARE
Problem: Rehabilitation (IRF) Plan of Care  Goal: Plan of Care Review  Flowsheets (Taken 8/25/2020 1833)  Plan of Care Reviewed With: patient  IRF Plan of Care Review: progress ongoing, continue  Outcome Summary: Pt cleared to ambulate with nursing with use of SPC.

## 2020-08-25 NOTE — PROGRESS NOTES
Patient: Guy Jarrett  Location: [facility] [address]  MRN: [id number]  Today's date: 8/25/2020    History of Present Illness  Guy is a 70 y.o. male admitted on 8/21/2020 with Brain metastases (CMS/HCC).     Pt is a 69 y/o male admitted to [facility] on 8/21/20 s/p craniotomy and mass resection. Pt with known metastatic melanoma diagnosed in August 2019 receiving chemotherapy diagnosed with brain metastasis on 4/22/2020 in the right temporal lobe treated with gamma knife on 5/5/2020 found to have rapidly enlarging right frontal metastases on subsequent surveillance imaging.  He was admitted to [facility] on 8/18/2020.  Dr. Barrera from neurosurgery was consulted and performed craniotomy and mass resection.    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    PT Vitals    Date/Time Pulse HR Source BP BP Location BP Method Pt Position [facility]   08/25/20 1334 48 Monitor 121/62 Left upper arm Automatic Sitting [facility]      PT Pain    Date/Time Pain Type Pref Pain Scale Rating: Rest [facility]   08/25/20 1334 Pain Assessment number (Numeric Rating Pain Scale) 0 C          Prior Living Environment      Most Recent Value   Lives With  spouse   Living Arrangements  house [2SH]   Living Environment Comment  Pt lives with wife Malka,   Location, Kitchen  first (main) floor   Kitchen Access Comment  wife performs cooking tasks   Location, Laundry Room  second floor, must negotiate stairs to access   Laundry Room Access Comment  wife performs laundry (pt reports he assists at times)   Location, Patient Bedroom  second floor, must negotiate stairs to access   Location, Bathroom  second floor, must negotiate stairs to access   Bathroom Access Comment  walk-in shower s GB, standard height toilet          Prior Level of Function      Most Recent Value   Dominant Hand  " right   Ambulation  independent   Transferring  independent   Toileting  independent   Bathing  independent   Dressing  independent   Eating  independent   Prior Level of Function Comment  Independent PTA. (+) . Enjoys golf and household projects.   Assistive Device/Animal Currently Used at Home  none          IRF PT Evaluation and Treatment - 08/25/20 1335        Time Calculation    Start Time  1330     Stop Time  1400     Time Calculation (min)  30 min        Session Details    Document Type  daily treatment/progress note     Mode of Treatment  physical therapy;individual therapy        Sit to Stand Transfer    [place], Sit to Stand Transfer  close supervision     Verbal Cues  safety     Assistive Device  none        Stand to Sit Transfer    [place], Stand to Sit Transfer  close supervision     Verbal Cues  safety     Assistive Device  none        Gait Training    [place], Gait  touching/steadying assist     Assistive Device  cane, straight     Distance in Feet  400 feet     Gait Pattern Utilized  step-through     Left Sided Gait Deviations  heel strike decreased     Advanced Gait Activity  step over obstacle     Comment  400' x 2 with min A/cl s        Step Over Obstacle (Mobility)    [place]  minimum assist (75% or more patient effort)     Comment  stepping over 6\" hurdles leading with LLE with use of SPC        Neuromuscular Re-education    Interventions  weight bearing     Positioning  kneeling     Comment  tall kneeling on blue foam - shoulder flexion holding ball x 20 reps, mini squats in kneeling x 10 reps        Daily Progress Summary (PT)    Daily Outcome Statement (PT)  Pt with decreased L hip extension in SLS, able to correct with verbal cues but difficulty maintaining.  Pt would benefit from continued LLE strengthening activities.  Nursing cleared to ambulate with patient with use of SPC.                            IRF PT Goals      Most Recent Value   Bed Mobility Goal 1 "   Activity/Assistive Device  sit to supine/supine to sit, rolling to left, rolling to right at 08/22/2020 1100   [place]  supervision required at 08/22/2020 1100   Time Frame  short-term goal (STG), 1 week at 08/22/2020 1100   Bed Mobility Goal 2   Activity/Assistive Device  sit to supine/supine to sit, rolling to left, rolling to right at 08/22/2020 1100   [place]  modified independence at 08/22/2020 1100   Time Frame  long-term goal (LTG), 21 days or less at 08/22/2020 1100   Transfer Goal 1   Activity/Assistive Device  sit-to-stand/stand-to-sit, bed-to-chair/chair-to-bed, stand pivot at 08/22/2020 1100   [place]  minimum assist (75% or more patient effort), verbal cues required at 08/22/2020 1100   Time Frame  short-term goal (STG), 1 week at 08/22/2020 1100   Transfer Goal 2   Activity/Assistive Device  sit-to-stand/stand-to-sit, bed-to-chair/chair-to-bed, car transfer, stand pivot at 08/22/2020 1100   [place]  modified independence at 08/22/2020 1100   Time Frame  long-term goal (LTG), 21 days or less at 08/22/2020 1100   Gait/Walking Locomotion Goal 1   Activity/Assistive Device  gait (walking locomotion) at 08/22/2020 1100   Distance  150 feet at 08/22/2020 1100   [place]  minimum assist (75% or more patient effort), verbal cues required at 08/22/2020 1100   Time Frame  short-term goal (STG), 1 week at 08/22/2020 1100   Gait/Walking Locomotion Goal 2   Activity/Assistive Device  gait (walking locomotion) at 08/22/2020 1100   Distance  200 feet at 08/22/2020 1100   [place]  modified independence at 08/22/2020 1100   Time Frame  long-term goal (LTG), 21 days or less at 08/22/2020 1100   Stairs Goal 1   Activity/Assistive Device  ascending stairs, descending stairs, using handrail, right, step-to-step at 08/22/2020 1100   Number of Stairs  8 at 08/22/2020 1100   [place]  minimum assist (75% or more patient effort), verbal cues required at 08/22/2020 1100   Time Frame   short-term goal (STG), 1 week at 08/22/2020 1100   Stairs Goal 2   Activity/Assistive Device  ascending stairs, descending stairs, using handrail, right, step-to-step at 08/22/2020 1100   Number of Stairs  12 at 08/22/2020 1100   [place]  modified independence at 08/22/2020 1100   Time Frame  long-term goal (LTG), 21 days or less at 08/22/2020 1100

## 2020-08-25 NOTE — ASSESSMENT & PLAN NOTE
PCP after discharge  Neurosurgeon Dr. Beau Barrera at \[facility]\"" in 4 to 6 weeks [phone].    Oncology NP Chyna Pugh 9/15/2020 10am [phone] for next round of chemo.

## 2020-08-25 NOTE — PROGRESS NOTES
Patient: Guy Jarrett  Location: [facility] [address]  MRN: [id number]  Today's date: 8/25/2020    History of Present Illness  Guy is a 70 y.o. male admitted on 8/21/2020 with Brain metastases (CMS/HCC).     Pt is a 69 y/o male admitted to [facility] on 8/21/20 s/p craniotomy and mass resection. Pt with known metastatic melanoma diagnosed in August 2019 receiving chemotherapy diagnosed with brain metastasis on 4/22/2020 in the right temporal lobe treated with gamma knife on 5/5/2020 found to have rapidly enlarging right frontal metastases on subsequent surveillance imaging.  He was admitted to \[facility]\"" on 8/18/2020.  Dr. Barrera from neurosurgery was consulted and performed craniotomy and mass resection.    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    OT Vitals    Date/Time Pulse HR Source BP BP Location BP Method Pt Position [facility]   08/25/20 1304 52 Monitor 133/62 Left upper arm Automatic Sitting DT      OT Pain    Date/Time Pain Type Pref Pain Scale Rating: Rest Rating: Activity [facility]   08/25/20 1304 Pain Assessment word (verbal rating pain scale) 0 - no pain 0 - no pain DT   08/25/20 1329 Post Activity word (verbal rating pain scale) 0 - no pain 0 - no pain DT          Prior Living Environment      Most Recent Value   Lives With  spouse   Living Arrangements  house [2SH]   Living Environment Comment  Pt lives with wife Malka,   Location, Kitchen  first (main) floor   Kitchen Access Comment  wife performs cooking tasks   Location, Laundry Room  second floor, must negotiate stairs to access   Laundry Room Access Comment  wife performs laundry (pt reports he assists at times)   Location, Patient Bedroom  second floor, must negotiate stairs to access   Location, Bathroom  second floor, must negotiate stairs to access   Bathroom Access  Comment  walk-in shower s GB, standard height toilet          Prior Level of Function      Most Recent Value   Dominant Hand  right   Ambulation  independent   Transferring  independent   Toileting  independent   Bathing  independent   Dressing  independent   Eating  independent   Prior Level of Function Comment  Independent PTA. (+) . Enjoys golf and household projects.   Assistive Device/Animal Currently Used at Home  none          IRF OT Evaluation and Treatment - 08/25/20 1304        Time Calculation    Start Time  1300     Stop Time  1330     Time Calculation (min)  30 min        Session Details    Document Type  daily treatment/progress note     Mode of Treatment  individual therapy;occupational therapy        Sit to Stand Transfer    [place], Sit to Stand Transfer  close supervision     Verbal Cues  safety     Assistive Device  none        Stand to Sit Transfer    [place], Stand to Sit Transfer  close supervision     Verbal Cues  safety     Assistive Device  none        Lower Extremity (Therapeutic Exercise)    Comment (LE Therapeutic Exercise)  While in stand with RLE on 8 inch block and mirror positioned anteriorly, pt faciliate L lateral glute activiation to raise R hip. Pt required MOD A to maintain stabilization of trunk during activity and frequent demonstration from therapist to perform appropriately. Pt then maintained stance (RLE on block) and focusing on faciliating WBing through LLE. Pt difficulties maintaining balance. While seated in w/c, pt engaged in plantar/dorsiflexion by pressing on half dome.        Daily Progress Summary (OT)    Daily Outcome Statement (OT)  Pt engaged in LLE strengthening per request. Difficulties with motor planning requiring MAX cuing through demonstration and manual cues.                             IRF OT Goals      Most Recent Value   Transfer Goal 1   Activity/Assistive Device  toilet at 08/22/2020 0704   [place]  moderate assist (50-74% patient  effort) at 08/22/2020 0704   Time Frame  short-term goal (STG), 1 week at 08/22/2020 0704   Transfer Goal 2   Activity/Assistive Device  toilet at 08/22/2020 0704   [place]  modified independence at 08/22/2020 0704   Time Frame  long-term goal (LTG), 4 weeks at 08/22/2020 0704   Transfer Goal 3   Activity/Assistive Device  shower at 08/22/2020 0704   [place]  minimum assist (75% or more patient effort) at 08/22/2020 0704   Time Frame  short-term goal (STG), 1 week at 08/22/2020 0704   Transfer Goal 4   Activity/Assistive Device  shower at 08/22/2020 0704   [place]  modified independence at 08/22/2020 0704   Time Frame  long-term goal (LTG), 4 weeks at 08/22/2020 0704   Bathing Goal 1   Activity/Assistive Device  bathing skills, all at 08/22/2020 0704   [place]  other (see comments) [touching/steadying assist] at 08/22/2020 0704   Time Frame  short-term goal (STG), 1 week at 08/22/2020 0704   Bathing Goal 2   Activity/Assistive Device  bathing skills, all at 08/22/2020 0704   [place]  modified independence at 08/22/2020 0704   Time Frame  long-term goal (LTG), 4 weeks at 08/22/2020 0704   UB Dressing Goal 1   Activity/Assistive Device  upper body dressing at 08/22/2020 0704   [place]  minimum assist (75% or more patient effort) at 08/22/2020 0704   Time Frame  short-term goal (STG), 1 week at 08/22/2020 0704   UB Dressing Goal 2   Activity/Assistive Device  upper body dressing at 08/22/2020 0704   [place]  modified independence at 08/22/2020 0704   Time Frame  long-term goal (LTG), 4 weeks at 08/22/2020 0704   LB Dressing Goal 1   Activity/Assistive Device  lower body dressing at 08/22/2020 0704   [place]  moderate assist (50-74% patient effort) at 08/22/2020 0704   Time Frame  short-term goal (STG), 1 week at 08/22/2020 0704   LB Dressing Goal 2   Activity/Assistive Device  lower body dressing at 08/22/2020 0704   [place]  modified independence at 08/22/2020 0704   Time  Frame  long-term goal (LTG), 4 weeks at 08/22/2020 0704   Grooming Goal 1   Activity/Assistive Device  grooming skills, all at 08/22/2020 0704   [place]  minimum assist (75% or more patient effort) at 08/22/2020 0704   Time Frame  short-term goal (STG), 1 week at 08/22/2020 0704   Grooming Goal 2   Activity/Assistive Device  grooming skills, all at 08/22/2020 0704   [place]  modified independence at 08/22/2020 0704   Time Frame  long-term goal (LTG), 4 weeks at 08/22/2020 0704   Toileting Goal 1   Activity/Assistive Device  toileting skills, all at 08/22/2020 0704   [place]  minimum assist (75% or more patient effort) at 08/22/2020 0704   Time Frame  short-term goal (STG), 1 week at 08/22/2020 0704   Toileting Goal 2   Activity/Assistive Device  toileting skills, all at 08/22/2020 0704   [place]  modified independence at 08/22/2020 0704   Time Frame  long-term goal (LTG), 4 weeks at 08/22/2020 0704

## 2020-08-25 NOTE — PROGRESS NOTES
Daily Progress Note    Patient was seen and examined.   Attestation Notes: Face to face encounter completed    Subjective     Interval History:   Patient seen this morning sitting in his wheelchair eating breakfast with no acute complaints and no acute events overnight.  Patient says he has been eating well and that his ankles has been weak but getting better.  Nursing staff does not endorse any acute events.  Patient denies cough, fever, shortness of breath, chills no nausea no vomiting no diarrhea no constipation no numbness no tingling.    Objective     Vital signs in last 24 hours:  Temp:  [36.4 °C (97.5 °F)-36.6 °C (97.9 °F)] 36.4 °C (97.5 °F)  Heart Rate:  [49-66] 49  Resp:  [18] 18  BP: ()/(56-80) 126/65    No intake or output data in the 24 hours ending 08/25/20 1212  Intake/Output this shift:  No intake/output data recorded.    Review of Systems:  All other systems reviewed and negative except as noted in the HPI.    Labs  Labs are pending.    Imaging  Not applicable    VTE Assessment: TBD    Full Code    Physical Exam   Constitutional: He is oriented to person, place, and time. Vital signs are normal. He appears well-developed and well-nourished.   HENT:   Head: Normocephalic.       Eyes: Pupils are equal, round, and reactive to light. Conjunctivae and EOM are normal.   Cardiovascular: Normal rate and regular rhythm.   Pulmonary/Chest: Effort normal and breath sounds normal.   Abdominal: Soft. Normal appearance and bowel sounds are normal.   Genitourinary:   Genitourinary Comments: No purvis catheter   Musculoskeletal:        Right hand: He exhibits decreased range of motion.   No jt erythema, warmth or effusion; decreased AROM R hand (which pt states is chronic related to cervical radiculopathy)   Neurological: He is alert and oriented to person, place, and time. He displays no tremor. No cranial nerve deficit or sensory deficit. He exhibits normal muscle tone. He displays no seizure  activity. Coordination abnormal.   Fluent, follows commands, facial muscle symmetric, tongue midline protrusion, sensation light touch intact, strength testing reveals 5/5 strength deltoid, biceps, triceps, wrist extensors, wrist flexors, and 4/5 strength finger flexors, 3+/5 strength dorsal interossei right upper extremity; 5/5 strength throughout right lower extremity; 4/5 strength throughout left upper extremity, 3+/5 strength hip flexors, knee flexors, knee extensors, 2/5 strength ankle plantar flexors, 0/5 strength dorsiflexors, 1/5 strength toe extensors left lower extremity.  Tone 0/4.  Impaired coordination left side, fix left upper extremity on rapid alternating movement.   Skin: Skin is warm, dry and intact.   Psychiatric: He has a normal mood and affect. His speech is normal and behavior is normal.     Plan of care was discussed with patient    Assessment & Plan  Follow up  Assessment & Plan  PCP after discharge  Neurosurgeon Dr. Beau Barrera at [facility] in 4 to 6 weeks [phone].    Oncology NP Chyna Pugh 9/15/2020 10am [phone] for next round of chemo.    Pain  Assessment & Plan  Tylenol, Tylenol 3 as needed    Seizure disorder (CMS/HCC)  Assessment & Plan  Continue Keppra    Risk for falls  Assessment & Plan  Wean off restraints as able    At high risk for pressure injury of skin  Assessment & Plan  Turns every 2 hours, Desitin to sacral area    At high risk for deep venous thrombosis  Assessment & Plan  SC heparin    Hyperlipidemia  Assessment & Plan  Continue lipitor    HTN (hypertension)  Assessment & Plan  Continue lisinopril    Metastatic melanoma (CMS/HCC)  Assessment & Plan  Mr. Jarrett is a 70-year-old male with known metastatic melanoma diagnosed in August 2019 receiving chemotherapy diagnosed with brain metastasis on 4/22/2020 in the right temporal lobe treated with gamma knife on 5/5/2020 found to have rapidly enlarging right frontal metastases on subsequent surveillance imaging.  He was  admitted to [facility] on 8/18/2020.  Dr. Barrera from neurosurgery was consulted and performed craniotomy and mass resection.  He was started on Keppra for intraoperative seizure.  Postoperatively he was started on Decadron taper.   He was cleared to initiate heparin for DVT prophylaxis.   Next dose Nivolumab due 9/15/20.  He was ultimately determined to medically stable for transfer to [facility] rehab on 8/21/2020 for an acute inpatient rehabilitation program to address deficits related to metastatic melanoma with brain metastases status post craniotomy and resection.    He is weightbearing as tolerated.  He will participate in 3 hours of physical therapy, Occupational Therapy, and speech therapy as well as evaluation by psychology and 24-hour rehab nursing care.  We will monitor his scalp incision daily.  Remove scalp incision staples by 9/1/2020.  He will follow-up with his surgeon Dr. Barrera at [facility] in 4 to 6 weeks [phone].  Follow-up with his oncology NP Chyna Pugh 9/15/2020 10am [phone] for next round of chemo.        Expected Discharge Date:      Dr. Kapil Hidalgo, PGY-2  Case discussed with TG Ambrose

## 2020-08-25 NOTE — PLAN OF CARE
Problem: Rehabilitation (IRF) Plan of Care  Goal: Plan of Care Review  Outcome: Progressing  Flowsheets (Taken 8/25/2020 0795)  Plan of Care Reviewed With: patient  IRF Plan of Care Review: progress ongoing, continue  Outcome Summary: Discussing options for equipment use when ambulating.

## 2020-08-25 NOTE — SUBJECTIVE & OBJECTIVE
Patient was seen and examined.   Attestation Notes: Face to face encounter completed    Subjective     Interval History:   Patient seen this morning sitting in his wheelchair eating breakfast with no acute complaints and no acute events overnight.  Patient says he has been eating well and that his ankles has been weak but getting better.  Nursing staff does not endorse any acute events.  Patient denies cough, fever, shortness of breath, chills no nausea no vomiting no diarrhea no constipation no numbness no tingling.    Objective     Vital signs in last 24 hours:  Temp:  [36.4 °C (97.5 °F)-36.6 °C (97.9 °F)] 36.4 °C (97.5 °F)  Heart Rate:  [49-66] 49  Resp:  [18] 18  BP: ()/(56-80) 126/65    No intake or output data in the 24 hours ending 08/25/20 1212  Intake/Output this shift:  No intake/output data recorded.    Review of Systems:  All other systems reviewed and negative except as noted in the HPI.    Labs  Labs are pending.    Imaging  Not applicable    VTE Assessment: TBD    Full Code    Physical Exam   Constitutional: He is oriented to person, place, and time. Vital signs are normal. He appears well-developed and well-nourished.   HENT:   Head: Normocephalic.       Eyes: Pupils are equal, round, and reactive to light. Conjunctivae and EOM are normal.   Cardiovascular: Normal rate and regular rhythm.   Pulmonary/Chest: Effort normal and breath sounds normal.   Abdominal: Soft. Normal appearance and bowel sounds are normal.   Genitourinary:   Genitourinary Comments: No purvis catheter   Musculoskeletal:        Right hand: He exhibits decreased range of motion.   No jt erythema, warmth or effusion; decreased AROM R hand (which pt states is chronic related to cervical radiculopathy)   Neurological: He is alert and oriented to person, place, and time. He displays no tremor. No cranial nerve deficit or sensory deficit. He exhibits normal muscle tone. He displays no seizure activity. Coordination abnormal.    Fluent, follows commands, facial muscle symmetric, tongue midline protrusion, sensation light touch intact, strength testing reveals 5/5 strength deltoid, biceps, triceps, wrist extensors, wrist flexors, and 4/5 strength finger flexors, 3+/5 strength dorsal interossei right upper extremity; 5/5 strength throughout right lower extremity; 4/5 strength throughout left upper extremity, 3+/5 strength hip flexors, knee flexors, knee extensors, 2/5 strength ankle plantar flexors, 0/5 strength dorsiflexors, 1/5 strength toe extensors left lower extremity.  Tone 0/4.  Impaired coordination left side, fix left upper extremity on rapid alternating movement.   Skin: Skin is warm, dry and intact.   Psychiatric: He has a normal mood and affect. His speech is normal and behavior is normal.     Plan of care was discussed with patient

## 2020-08-25 NOTE — PROGRESS NOTES
Patient: Guy Jarrett  Location: [facility] [address]  MRN: [id number]  Today's date: 8/25/2020    History of Present Illness  Guy is a 70 y.o. male admitted on 8/21/2020 with Brain metastases (CMS/HCC).     Pt is a 71 y/o male admitted to [facility] on 8/21/20 s/p craniotomy and mass resection. Pt with known metastatic melanoma diagnosed in August 2019 receiving chemotherapy diagnosed with brain metastasis on 4/22/2020 in the right temporal lobe treated with gamma knife on 5/5/2020 found to have rapidly enlarging right frontal metastases on subsequent surveillance imaging.  He was admitted to [facility] on 8/18/2020.  Dr. Barrera from neurosurgery was consulted and performed craniotomy and mass resection.    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    PT Vitals    Date/Time Pulse BP BP Location BP Method Pt Position [facility]   08/25/20 1006 59 119/80 Left upper arm Automatic Sitting LB      PT Pain    Date/Time Rating: Rest [facility]   08/25/20 1006 0 - no pain LB   08/25/20 1055 0 - no pain LB          Prior Living Environment      Most Recent Value   Lives With  spouse   Living Arrangements  house [2SH]   Living Environment Comment  Pt lives with wife Malka,   Location, Kitchen  first (main) floor   Kitchen Access Comment  wife performs cooking tasks   Location, Laundry Room  second floor, must negotiate stairs to access   Laundry Room Access Comment  wife performs laundry (pt reports he assists at times)   Location, Patient Bedroom  second floor, must negotiate stairs to access   Location, Bathroom  second floor, must negotiate stairs to access   Bathroom Access Comment  walk-in shower s GB, standard height toilet          Prior Level of Function      Most Recent Value   Dominant Hand  right   Ambulation  independent   Transferring   "independent   Toileting  independent   Bathing  independent   Dressing  independent   Eating  independent   Prior Level of Function Comment  Independent PTA. (+) . Enjoys golf and household projects.   Assistive Device/Animal Currently Used at Home  none          IRF PT Evaluation and Treatment - 08/25/20 1013        Time Calculation    Start Time  1000     Stop Time  1100     Time Calculation (min)  60 min        Session Details    Document Type  daily treatment/progress note     Mode of Treatment  physical therapy;individual therapy        Sit to Stand Transfer    [place], Sit to Stand Transfer  close supervision;verbal cues     Verbal Cues  safety     Assistive Device  walker, front-wheeled     Comment  Cl S with RW, steadying assist without AD        Stand to Sit Transfer    [place], Stand to Sit Transfer  close supervision;verbal cues     Verbal Cues  preparatory posture;safety     Assistive Device  walker, front-wheeled     Comment  Cl S with RW, steadying assist without AD        Stand Pivot Transfer    [place], Stand Pivot/Stand Step Transfer  close supervision;verbal cues     Verbal Cues  safety     Assistive Device  walker, front-wheeled     Comment  ambulatory approach, Cl S with RW, steadying assist without AD        Gait Training    [place], Gait  minimum assist (75% or more patient effort);verbal cues     Assistive Device  none     Distance in Feet  200 feet    x2    Deviations/Abnormal Patterns (Gait)  stride length decreased     Left Sided Gait Deviations  heel strike decreased     Comment  without AD: 200' x2 with VC for safe speed; with ' with Cl S, VC for safe speed        Curb Negotiation (Mobility)    [place]  close supervision;verbal cues     Comment  up/down 6+2\" curb with RW, VC for safety/sequencing        Sloped Surface Gait Skills (Mobility)    [place]  close supervision;verbal cues     Comment  up/down 5' ramp with RW, VC for safety        Stairs " Training    [place], Stairs  minimum assist (75% or more patient effort);verbal cues     Assistive Device  railing     Handrail Location  right side (ascending);left side (descending)     Number of Stairs  16     Stair Height  6 inches     Ascending Stairs Technique  step-over-step;step-to-step    Cl S step-to; Min A step-over    Descending Stairs Technique  step-to-step    LLE leading, Min A    Comment  VC for safety/sequencing        Lower Extremity (Therapeutic Exercise)    Exercise Position/Type (LE Therapeutic Exercise)  supine;side lying     Comment (LE Therapeutic Exercise)  L SAQ 15x2. [place] with physioroll 15x2. DKTC with physioroll, assist for L hip rotational control, x15. L hip ER 10x3        Daily Progress Summary (PT)    Daily Outcome Statement (PT)  Increasing independence with transfers and ambulation this session, both with/without AD. Noted improved active knee flexion control during sit to/from stand transitions. Plan to continue gait training, trialing SPC per patient request.              IRF PT Goals      Most Recent Value   Bed Mobility Goal 1   Activity/Assistive Device  sit to supine/supine to sit, rolling to left, rolling to right at 08/22/2020 1100   [place]  supervision required at 08/22/2020 1100   Time Frame  short-term goal (STG), 1 week at 08/22/2020 1100   Bed Mobility Goal 2   Activity/Assistive Device  sit to supine/supine to sit, rolling to left, rolling to right at 08/22/2020 1100   [place]  modified independence at 08/22/2020 1100   Time Frame  long-term goal (LTG), 21 days or less at 08/22/2020 1100   Transfer Goal 1   Activity/Assistive Device  sit-to-stand/stand-to-sit, bed-to-chair/chair-to-bed, stand pivot at 08/22/2020 1100   [place]  minimum assist (75% or more patient effort), verbal cues required at 08/22/2020 1100   Time Frame  short-term goal (STG), 1 week at 08/22/2020 1100   Transfer Goal 2   Activity/Assistive Device   sit-to-stand/stand-to-sit, bed-to-chair/chair-to-bed, car transfer, stand pivot at 08/22/2020 1100   [place]  modified independence at 08/22/2020 1100   Time Frame  long-term goal (LTG), 21 days or less at 08/22/2020 1100   Gait/Walking Locomotion Goal 1   Activity/Assistive Device  gait (walking locomotion) at 08/22/2020 1100   Distance  150 feet at 08/22/2020 1100   [place]  minimum assist (75% or more patient effort), verbal cues required at 08/22/2020 1100   Time Frame  short-term goal (STG), 1 week at 08/22/2020 1100   Gait/Walking Locomotion Goal 2   Activity/Assistive Device  gait (walking locomotion) at 08/22/2020 1100   Distance  200 feet at 08/22/2020 1100   [place]  modified independence at 08/22/2020 1100   Time Frame  long-term goal (LTG), 21 days or less at 08/22/2020 1100   Stairs Goal 1   Activity/Assistive Device  ascending stairs, descending stairs, using handrail, right, step-to-step at 08/22/2020 1100   Number of Stairs  8 at 08/22/2020 1100   [place]  minimum assist (75% or more patient effort), verbal cues required at 08/22/2020 1100   Time Frame  short-term goal (STG), 1 week at 08/22/2020 1100   Stairs Goal 2   Activity/Assistive Device  ascending stairs, descending stairs, using handrail, right, step-to-step at 08/22/2020 1100   Number of Stairs  12 at 08/22/2020 1100   [place]  modified independence at 08/22/2020 1100   Time Frame  long-term goal (LTG), 21 days or less at 08/22/2020 1100

## 2020-08-25 NOTE — PROGRESS NOTES
Patient: Guy Jarrett  Location: [facility] [address]  MRN: [id number]  Today's date: 8/25/2020    History of Present Illness  Guy is a 70 y.o. male admitted on 8/21/2020 with Brain metastases (CMS/HCC).     Pt is a 69 y/o male admitted to [facility] on 8/21/20 s/p craniotomy and mass resection. Pt with known metastatic melanoma diagnosed in August 2019 receiving chemotherapy diagnosed with brain metastasis on 4/22/2020 in the right temporal lobe treated with gamma knife on 5/5/2020 found to have rapidly enlarging right frontal metastases on subsequent surveillance imaging.  He was admitted to [facility] on 8/18/2020.  Dr. Barrera from neurosurgery was consulted and performed craniotomy and mass resection.    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    OT Vitals    Date/Time Pulse HR Source BP BP Location BP Method Pt Position [facility]   08/25/20 1101 49 Monitor 126/65 Left upper arm Automatic Sitting DT      OT Pain    Date/Time Pain Type Pref Pain Scale Rating: Rest Rating: Activity [facility]   08/25/20 1101 Pain Assessment word (verbal rating pain scale) 0 - no pain 0 - no pain DT   08/25/20 1159 Post Activity word (verbal rating pain scale) 0 - no pain 0 - no pain DT          Prior Living Environment      Most Recent Value   Lives With  spouse   Living Arrangements  house [2SH]   Living Environment Comment  Pt lives with wife Malka,   Location, Kitchen  first (main) floor   Kitchen Access Comment  wife performs cooking tasks   Location, Laundry Room  second floor, must negotiate stairs to access   Laundry Room Access Comment  wife performs laundry (pt reports he assists at times)   Location, Patient Bedroom  second floor, must negotiate stairs to access   Location, Bathroom  second floor, must negotiate stairs to access   Bathroom Access  Comment  walk-in shower s GB, standard height toilet          Prior Level of Function      Most Recent Value   Dominant Hand  right   Ambulation  independent   Transferring  independent   Toileting  independent   Bathing  independent   Dressing  independent   Eating  independent   Prior Level of Function Comment  Independent PTA. (+) . Enjoys golf and household projects.   Assistive Device/Animal Currently Used at Home  none          IRF OT Evaluation and Treatment - 08/25/20 1101        Time Calculation    Start Time  1100     Stop Time  1200     Time Calculation (min)  60 min        Session Details    Document Type  daily treatment/progress note     Mode of Treatment  individual therapy;occupational therapy        Sit to Stand Transfer    [place], Sit to Stand Transfer  close supervision     Verbal Cues  safety     Assistive Device  walker, front-wheeled        Stand to Sit Transfer    [place], Stand to Sit Transfer  close supervision     Verbal Cues  safety     Assistive Device  walker, front-wheeled        Stand Pivot Transfer    [place], Stand Pivot/Stand Step Transfer  close supervision     Verbal Cues  safety     Assistive Device  walker, front-wheeled     Comment  ambulatory approach        Safety Issues, Functional Mobility    Comment, Safety Issues/Impairments (Mobility)  CLS-MIN A ambulating c RW on unit        Motor Skills    Motor Skills  writing     Functional Endurance  Completed hand exerciser/theraputty and edu on exercise to complete in room     Motor Control/Coordination Interventions  neuro-muscular re-education        Writing Skills    Writing Skills  mild impairment;words;dominant hand     Comment (Writing)  RUE, noted improvment in ease and legibility utilizing built up pen. Issued foam built up handles to add to pen, pencil, and eating utensils.        Neuromuscular Re-education    Interventions  weight bearing     Positioning  quadruped     Comment  While in quadruped,  pt raised one extremity at time requiring MOD cues to maintain balance at hips and assist to maintain positioning of RUE. x5        Upper Extremity (Therapeutic Exercise)    Exercise Position/Type (UE Therapeutic Exercise)  supine;AROM (active range of motion);prone     Reps and Sets (Upper Extremity Therapeutic Exercise)  see comments for details, holding end ranges for 5 seconds     Comment (UE Therapeutic Exercise)  While in supine, pt sliding BUE overhead while maintaining contact with the mat 3x10. While in prone, pt performed scapular retraction 3x5        Daily Progress Summary (OT)    Daily Outcome Statement (OT)  Pt engaged in overall strengthening and RUE coordination and strengthening. Issued built up handles to add to utensils to improve each during self-feeding and improve legibility.                        Education provided this session. See the Patient Education summary report for full details.    IRF OT Goals      Most Recent Value   Transfer Goal 1   Activity/Assistive Device  toilet at 08/22/2020 0704   [place]  moderate assist (50-74% patient effort) at 08/22/2020 0704   Time Frame  short-term goal (STG), 1 week at 08/22/2020 0704   Transfer Goal 2   Activity/Assistive Device  toilet at 08/22/2020 0704   [place]  modified independence at 08/22/2020 0704   Time Frame  long-term goal (LTG), 4 weeks at 08/22/2020 0704   Transfer Goal 3   Activity/Assistive Device  shower at 08/22/2020 0704   [place]  minimum assist (75% or more patient effort) at 08/22/2020 0704   Time Frame  short-term goal (STG), 1 week at 08/22/2020 0704   Transfer Goal 4   Activity/Assistive Device  shower at 08/22/2020 0704   [place]  modified independence at 08/22/2020 0704   Time Frame  long-term goal (LTG), 4 weeks at 08/22/2020 0704   Bathing Goal 1   Activity/Assistive Device  bathing skills, all at 08/22/2020 0704   [place]  other (see comments) [touching/steadying assist] at 08/22/2020 0704    Time Frame  short-term goal (STG), 1 week at 08/22/2020 0704   Bathing Goal 2   Activity/Assistive Device  bathing skills, all at 08/22/2020 0704   [place]  modified independence at 08/22/2020 0704   Time Frame  long-term goal (LTG), 4 weeks at 08/22/2020 0704   UB Dressing Goal 1   Activity/Assistive Device  upper body dressing at 08/22/2020 0704   [place]  minimum assist (75% or more patient effort) at 08/22/2020 0704   Time Frame  short-term goal (STG), 1 week at 08/22/2020 0704   UB Dressing Goal 2   Activity/Assistive Device  upper body dressing at 08/22/2020 0704   [place]  modified independence at 08/22/2020 0704   Time Frame  long-term goal (LTG), 4 weeks at 08/22/2020 0704   LB Dressing Goal 1   Activity/Assistive Device  lower body dressing at 08/22/2020 0704   [place]  moderate assist (50-74% patient effort) at 08/22/2020 0704   Time Frame  short-term goal (STG), 1 week at 08/22/2020 0704   LB Dressing Goal 2   Activity/Assistive Device  lower body dressing at 08/22/2020 0704   [place]  modified independence at 08/22/2020 0704   Time Frame  long-term goal (LTG), 4 weeks at 08/22/2020 0704   Grooming Goal 1   Activity/Assistive Device  grooming skills, all at 08/22/2020 0704   [place]  minimum assist (75% or more patient effort) at 08/22/2020 0704   Time Frame  short-term goal (STG), 1 week at 08/22/2020 0704   Grooming Goal 2   Activity/Assistive Device  grooming skills, all at 08/22/2020 0704   [place]  modified independence at 08/22/2020 0704   Time Frame  long-term goal (LTG), 4 weeks at 08/22/2020 0704   Toileting Goal 1   Activity/Assistive Device  toileting skills, all at 08/22/2020 0704   [place]  minimum assist (75% or more patient effort) at 08/22/2020 0704   Time Frame  short-term goal (STG), 1 week at 08/22/2020 0704   Toileting Goal 2   Activity/Assistive Device  toileting skills, all at 08/22/2020 0704   [place]  modified independence at  08/22/2020 0704   Time Frame  long-term goal (LTG), 4 weeks at 08/22/2020 0704

## 2020-08-25 NOTE — PLAN OF CARE
Problem: Rehabilitation (IRF) Plan of Care  Goal: Plan of Care Review  Outcome: Progressing  Flowsheets (Taken 8/25/2020 0405)  Plan of Care Reviewed With: patient  IRF Plan of Care Review: progress ongoing, continue  Outcome Summary: Pt. selpt well, in no acute distress, no events overnight, no c/o pain, continent of bowel and bladder, fall/safety precautions maintained, SR x4, bed alarm activated, call bell in reach

## 2020-08-25 NOTE — ASSESSMENT & PLAN NOTE
Mr. Jarrett is a 70-year-old male with known metastatic melanoma diagnosed in August 2019 receiving chemotherapy diagnosed with brain metastasis on 4/22/2020 in the right temporal lobe treated with gamma knife on 5/5/2020 found to have rapidly enlarging right frontal metastases on subsequent surveillance imaging.  He was admitted to [facility] on 8/18/2020.  Dr. Barrera from neurosurgery was consulted and performed craniotomy and mass resection.  He was started on Keppra for intraoperative seizure.  Postoperatively he was started on Decadron taper.   He was cleared to initiate heparin for DVT prophylaxis.   Next dose Nivolumab due 9/15/20.  He was ultimately determined to medically stable for transfer to [place] [facility] on 8/21/2020 for an acute inpatient rehabilitation program to address deficits related to metastatic melanoma with brain metastases status post craniotomy and resection.    He is weightbearing as tolerated.  He will participate in 3 hours of physical therapy, Occupational Therapy, and speech therapy as well as evaluation by psychology and 24-hour rehab nursing care.  We will monitor his scalp incision daily.  Remove scalp incision staples by 9/1/2020.  He will follow-up with his surgeon Dr. Barrera at [facility] in 4 to 6 weeks [phone].  Follow-up with his oncology NP Chyna Pugh 9/15/2020 10am [phone] for next round of chemo.

## 2020-08-26 ENCOUNTER — APPOINTMENT (INPATIENT)
Dept: PHYSICAL THERAPY | Facility: REHABILITATION | Age: 70
DRG: 949 | End: 2020-08-26
Payer: COMMERCIAL

## 2020-08-26 ENCOUNTER — APPOINTMENT (INPATIENT)
Dept: OCCUPATIONAL THERAPY | Facility: REHABILITATION | Age: 70
DRG: 949 | End: 2020-08-26
Payer: COMMERCIAL

## 2020-08-26 LAB
GLUCOSE BLD-MCNC: 104 MG/DL (ref 70–99)
GLUCOSE BLD-MCNC: 147 MG/DL (ref 70–99)
POCT TEST: ABNORMAL
POCT TEST: ABNORMAL

## 2020-08-26 PROCEDURE — 97110 THERAPEUTIC EXERCISES: CPT | Mod: GP

## 2020-08-26 PROCEDURE — 12800001 HC ROOM AND CARE SEMIPRIVATE REHAB-BRAIN INJ

## 2020-08-26 PROCEDURE — 63700000 HC SELF-ADMINISTRABLE DRUG: Performed by: PHYSICAL MEDICINE & REHABILITATION

## 2020-08-26 PROCEDURE — 63700000 HC SELF-ADMINISTRABLE DRUG: Performed by: HOSPITALIST

## 2020-08-26 PROCEDURE — 63600000 HC DRUGS/DETAIL CODE: Performed by: STUDENT IN AN ORGANIZED HEALTH CARE EDUCATION/TRAINING PROGRAM

## 2020-08-26 PROCEDURE — 97112 NEUROMUSCULAR REEDUCATION: CPT | Mod: GP

## 2020-08-26 PROCEDURE — 97535 SELF CARE MNGMENT TRAINING: CPT | Mod: GO

## 2020-08-26 PROCEDURE — 97530 THERAPEUTIC ACTIVITIES: CPT | Mod: GO

## 2020-08-26 PROCEDURE — 63600000 HC DRUGS/DETAIL CODE: Performed by: PHYSICAL MEDICINE & REHABILITATION

## 2020-08-26 PROCEDURE — 97116 GAIT TRAINING THERAPY: CPT | Mod: GP

## 2020-08-26 RX ORDER — INSULIN ASPART 100 [IU]/ML
3-5 INJECTION, SOLUTION INTRAVENOUS; SUBCUTANEOUS
Status: DISCONTINUED | OUTPATIENT
Start: 2020-08-26 | End: 2020-08-29 | Stop reason: HOSPADM

## 2020-08-26 RX ADMIN — PANTOPRAZOLE SODIUM 40 MG: 40 TABLET, DELAYED RELEASE ORAL at 08:07

## 2020-08-26 RX ADMIN — DEXAMETHASONE 4 MG: 4 TABLET ORAL at 08:07

## 2020-08-26 RX ADMIN — LEVETIRACETAM 1000 MG: 500 TABLET ORAL at 08:08

## 2020-08-26 RX ADMIN — ATORVASTATIN CALCIUM 10 MG: 10 TABLET, FILM COATED ORAL at 08:08

## 2020-08-26 RX ADMIN — HEPARIN SODIUM 5000 UNITS: 5000 INJECTION, SOLUTION INTRAVENOUS; SUBCUTANEOUS at 05:06

## 2020-08-26 RX ADMIN — DEXAMETHASONE 4 MG: 4 TABLET ORAL at 21:06

## 2020-08-26 RX ADMIN — LEVETIRACETAM 1000 MG: 500 TABLET ORAL at 21:06

## 2020-08-26 RX ADMIN — LISINOPRIL 10 MG: 10 TABLET ORAL at 08:07

## 2020-08-26 RX ADMIN — HEPARIN SODIUM 5000 UNITS: 5000 INJECTION, SOLUTION INTRAVENOUS; SUBCUTANEOUS at 21:07

## 2020-08-26 RX ADMIN — HEPARIN SODIUM 5000 UNITS: 5000 INJECTION, SOLUTION INTRAVENOUS; SUBCUTANEOUS at 14:13

## 2020-08-26 NOTE — PLAN OF CARE
Problem: Rehabilitation (IRF) Plan of Care  Goal: Plan of Care Review  Flowsheets (Taken 8/26/2020 1234)  Plan of Care Reviewed With: patient  IRF Plan of Care Review: progress ongoing, continue  Outcome Summary: Patient Cl S with intermittent steadying assist for all mobility without AD. Motivated to participate. LLE strength continues to improve.

## 2020-08-26 NOTE — PLAN OF CARE
Problem: Rehabilitation (IRF) Plan of Care  Goal: Plan of Care Review  Flowsheets (Taken 8/26/2020 3244)  Plan of Care Reviewed With: patient; spouse ([place])  IRF Plan of Care Review: discharge pending  Outcome Summary: Team held today.  D/c to home on Sat 8/29 with OP.  Pt was doing OP at [facility] and wants to resume OP there for PT/OT.  Wife will coordinate the appointments for OP.  Rx to be given to pt for d/c.  Reviewed POC/LOF and dcp.  Following thru d/c for needs/supports.

## 2020-08-26 NOTE — SUBJECTIVE & OBJECTIVE
Patient was seen and examined.   Attestation Notes: Face to face encounter completed and patient discussed in team conference    Subjective     Interval History: has no complaint of headache, fever, chills, sweats, abd discomfort, n/v, new numbness, tingling or weakness.. spoke with nursing and  and there were no overnight issues reported.   History also provided by:     Objective     Vital signs in last 24 hours:  Temp:  [36.4 °C (97.6 °F)-36.6 °C (97.8 °F)] 36.4 °C (97.6 °F)  Heart Rate:  [46-54] 54  Resp:  [18-20] 18  BP: (101-138)/(58-79) 118/79    No intake or output data in the 24 hours ending 08/26/20 1018  Intake/Output this shift:  No intake/output data recorded.    Review of Systems:  All other systems reviewed and negative except as noted in the HPI.    Labs  No new labs.    Imaging  Not applicable    VTE Assessment: TBD    Full Code    Physical Exam  Physical Exam   Constitutional: He is oriented to person, place, and time. Vital signs are normal. He appears well-developed and well-nourished.   HENT:   Head: Normocephalic.       Eyes: Pupils are equal, round, and reactive to light. Conjunctivae and EOM are normal.   Cardiovascular: Normal rate and regular rhythm.   Pulmonary/Chest: Effort normal and breath sounds normal.   Abdominal: Soft. Normal appearance and bowel sounds are normal.   Genitourinary:   Genitourinary Comments: No purvis catheter   Musculoskeletal: Normal range of motion.   No jt erythema, warmth or effusion   Neurological: He is alert and oriented to person, place, and time. He displays no tremor. No cranial nerve deficit or sensory deficit. He exhibits normal muscle tone. He displays no seizure activity. Coordination and gait abnormal.   Fluent, follows commands, strength LLE improving now grossly >3/5 throughout, tone 0/4.  Impaired coordination L side.   Skin: Skin is warm, dry and intact.   Psychiatric: He has a normal mood and affect. His speech is normal and behavior is normal.  Cognition and memory are impaired.           Plan of care was discussed with patient

## 2020-08-26 NOTE — PATIENT CARE CONFERENCE
Inpatient Rehabilitation Team Conference Note  Date: 8/26/2020  Time: 10:19 AM       Patient Name: Guy Jarrett     Medical Record Number: [id number]   YOB: 1950  Sex: Male      Room/Bed: 201/201D  Payor Info: Payor: IBC / Plan: [employer] BASIC / Product Type:  Managed Care /     Admitting Diagnosis: Brain metastases (CMS/HCC) [C79.31]   Admit Date/Time: 8/21/2020  5:44 PM  Admission Comments: No comment available     Primary Diagnosis: No Principal Problem: There is no principal problem currently on the Problem List. Please update the Problem List and refresh.  Principal Problem: No Principal Problem: There is no principal problem currently on the Problem List. Please update the Problem List and refresh.    Patient Active Problem List    Diagnosis Date Noted   • Arthritis 08/24/2020   • Metastatic melanoma (CMS/HCC) 08/21/2020   • Brain metastases (CMS/HCC) 08/21/2020   • HTN (hypertension) 08/21/2020   • Hyperlipidemia 08/21/2020   • S/P craniotomy 08/21/2020   • At high risk for deep venous thrombosis 08/21/2020   • At high risk for pressure injury of skin 08/21/2020   • Risk for falls 08/21/2020   • Seizure disorder (CMS/HCC) 08/21/2020   • Pain 08/21/2020   • Follow up 08/21/2020       Premorbid Status:  Dominant Hand: right  Ambulation: independent  Transferring: independent  Toileting: independent  Bathing: independent  Dressing: independent  Eating: independent  Communication: understands/communicates without difficulty  Swallowing: swallows foods/liquids without difficulty  Baseline Diet/Method of Nutritional Intake: thin liquids;regular solids  Assistive Device/Animal Currently Used at Home: none  Prior Level of Function Comment: Independent PTA. (+) . Enjoys golf and household projects.      Current Functional Status:  Mobility  Gait  [place], Gait: touching/steadying assist;close supervision;verbal cues  Assistive Device: none  Comment: 400' x 2 with min A/cl  s    Stairs  [place], Stairs: close supervision;verbal cues  Assistive Device: railing  Handrail Location: right side (ascending);left side (descending)  Number of Stairs: 16  Stair Height: 6 inches  Comment: VC for safety/sequencing    Wheelchair  Functional Mobility Training: forward propulsion  [place], Forward Propulsion: dependent (less than 25% patient effort)  Distance Propelled in Feet: 0 feet  Comment, Functional Mobility: Patient utilizing W/C for transportation purposes within hospital setting. Do not anticipate need for W/C for locomotion upon D/C from BMR.    Transfers  [place], Roll Left: close supervision  [place], Roll Right: supervision  [place], Supine to Sit: supervision  [place], Sit to Supine: supervision  Assistive Device (Bed Mobility): none  Comment (Bed Mobility): on mat table  Comment: Pt engaged in sit to stand transitions focusing on WBing through LLE, required MIN A to complete.   [place], Bed to Chair: moderate assist (50-74% patient effort);verbal cues  Verbal Cues: hand placement;safety;technique  Assistive Device: other (see comments) (none)  Comment: Min/Mod A SPT without AD  [place], Chair to Bed: moderate assist (50-74% patient effort);verbal cues  Verbal Cues: hand placement;safety;technique  Comment: Min/Mod A SPT without AD  [place], Sit to Stand Transfer: close supervision;verbal cues  Verbal Cues: safety  Assistive Device: none  Comment: Cl S with RW, steadying assist without AD  [place], Stand to Sit Transfer: close supervision;verbal cues  Verbal Cues: preparatory posture;safety  Assistive Device: none  Comment: Cl S with RW, steadying assist without AD  [place], Stand Pivot/Stand Step Transfer: close supervision  Verbal Cues: safety  Assistive Device: walker, front-wheeled  Comment: ambulatory approach    Transfer Technique: stand pivot  [place], Toilet Transfer: close supervision  Assistive Device: cane,  straight;raised toilet seat;grab bars/safety frame  Comment: ambulatory approach, edu on toilet safety frame for home  Transfer Technique: stand pivot  [place], Shower Transfer: close supervision  Assistive Device: shower chair;grab bars/tub rail;cane, straight  Comment: ambulatory approach, CLS in simulated walk-in shower. edu on DME recommendation including shower chair and GB - pt agreeable      Self Care  Self-Performance: don;obtains clothes;threads left arm, shirt;threads right arm, shirt;pulls shirt over head/around back;pulls shirt down/adjusts  [place] Assistance: threads left arm, shirt;pulls shirt down/adjusts  Adaptive Equipment: none  Self-Performance: don;obtains clothes;threads left leg, underpants;threads right leg, underpants;pulls underpants up or down;threads left leg, pants/shorts;threads right leg, pants/shorts;pulls pants/shorts up or down;dons/doffs left sock;dons/doffs right sock;dons/doffs left shoe;dons/doffs right shoe;shoelaces, left;shoelaces, right  [place] Assistance: obtains clothes;threads left leg, underpants;threads right leg, underpants;pulls underpants up or down;threads left leg, pants/shorts;threads right leg, pants/shorts;pulls pants/shorts up or down;dons/doffs left sock;dons/doffs right sock  Adaptive Equipment: none  [place]: close supervision  Self-Performance: chest;left arm;right arm;front perineal area;abdomen;buttocks;left upper leg;right upper leg;left lower leg, including foot;right lower leg, including foot  Adaptive Equipment: grab bar/tub rail;hand-held shower spray hose;shower chair  Setup Assistance: obtain supplies  Comment: S while seated, CLS in stand to wash buttocks  [place]: close supervision;supervision  Adaptive Equipment: grab bar/safety frame;raised toilet seat  Comment: S while seated, CLS in stand for CM  Self-Performance: washes, rinses and dries face;washes, rinses and dries hands;oral care (brushing teeth, cleaning  dentures);brushes/clark hair;shaves face;applies deodorant  [place]: supervision  Comment: declined oral care    Cognition  Affect/Mental Status (Cognitive): WFL  Orientation Status (Cognition): oriented x 4  Follows Commands (Cognition): WFL  Cognitive Function (Cognitive): WFL;other (see comments) (appears attention is pt baseline )  Comment, Cognition: A&O x4. Able to follow all instructions for mobility assessments.  City: 2-->logical cuing  Kind of Place: 3-->spontaneous/free recall  Name of [facility]: 3-->spontaneous/free recall  Month: 3-->spontaneous/free recall  Date: 3-->spontaneous/free recall  Year: 3-->spontaneous/free recall  Day of Week: 3-->spontaneous/free recall  Clock Time: 3-->spontaneous/free recall  Etiology/Event: 3-->spontaneous/free recall  Pathology Deficits: 3-->spontaneous/free recall  Total Score: 29  Comment: AOx4     Communication  Speech Intelligibility (Motor Speech): WNL;conversational level;other (see comments) (pt speaks at rapid rate  )  Conversational Level, Speech Intelligibility (Motor Speech): intact  Articulation (Motor Speech): WNL  Speech Fluency (Motor Speech): WNL  Vocal Loudness (Motor Speech): WNL  Breath Support (Motor Speech): intact  Rate/Prosody (Motor Speech): WNL  Resonance (Motor Speech): WNL  Comment, Motor Speech Assessment: 100% intelligible at the conversational level   Follows Commands (Auditory Comprehension): 2-step commands  Yes/No Questions (Auditory Comprehension): WFL;complex questions;simple/factual questions;biographical/personal questions  Complex Questions (Auditory Comprehension): intact  Simple/Factual Questions (Auditory Comprehension): intact;over 90% accuracy  Biographical/Personal Questions (Auditory Comprehension): intact;over 90% accuracy  Comment, Assessment (Auditory Comprehension): Auditory comprehension for complex language WFL. Pt self advocates if needs information repeated. Pt reports this as baseline. + for all tasks on initial  evaluation.       Frequency of Treatment (OT): 5-7 times per week, 60-90 minutes per day  Frequency of Treatment (PT): 60-90 minutes per day, 5-7 times per week  Therapy Frequency (SLP): other (see comments)(ST not recommended upon completition of IE.)  Rec Therapy Frequency of Treatment: 60-90 minutes per session, 3-5 times per week, 30 minutes per session    Weekly Outcome Summaries:  Weekly Progress Summary (PT)  Progress Toward Functional Goals (PT): progressing toward functional goals as expected  Weekly Outcome Statement: Patient has made significant improvements in PT since initial evaluation. He has improved from Min/Mod A for transfers to steadying assistance. Gait has improved from assist of 2 to Min A to Cl S of 1 person, assist level dependent on device used (Cl S with RW, Min A without AD). He demonstrates significant improvement in LLE strength (e.g., DF improved from 1/5 at initial evaluation to 3-/5 currently). [place]rella balance Scale score of 36/56 indicates increased fall risk at this time. The patient will continue to benefit from skilled I/P PT in order to address below-stated deficits, to ensure the highest level of safety and independence upon discharge.   Impairments Continuing to Limit Function: decreased strength, hemiparesis/hemiplegia, impaired balance, impaired coordination, impaired motor control, impaired safety awareness  Recommendations (PT): Continue I/P PT 1-2 hours/day 5+ days/week with interventions including: balance training;bed mobility training;gait training;motor coordination training;neuromuscular re-education;orthotic fitting/training;patient/family education;stair training;strengthening;stretching;transfer training  Weekly Progress Summary (OT)  Progress Toward Functional Goals (OT): progressing toward functional goals as expected  Weekly Outcome Statement: Completed MoCA scoring 24/30 (WFL is greater or equal to 26/30), with deficits in language and delayed recall. Noted with  "B/L coordination deficits; impaired FMC on RUE compared to LUE due to hx of cervical deficits PTA. Vision: saccadic intrusions and anisocoria (L larger than R). Pt with impaired safety awareness, requires frequent cues for safety and improving pacing during HHM and functional transfers. Difficulties with motor planning.   Impairments Continuing to Limit Function: abnormal muscle tone, decreased strength, impaired balance, impaired coordination, impaired motor control, impaired postural control, impaired safety awareness  Recommendations (OT): Pt would continued to benefit from skilled OT services to enhance independence in ADLs and other functional tasks.   Weekly Outcome Summary (Interdisciplinary)  Weekly Outcome Statement: Patient was oriented to self, situation, date, and location. He denied significant changes in cognitive abilities. He described his mood as \"pretty good\" although he noted that he can be melancholy when thinking about his prognosis and the impact it may have on his family. He was appropriately tearful when discussing emotional topics. He indicated that talking to family/friends, reading, and using humor often helps him cope with distressing thoughts. He identified goals which included traveling, golf, and seeing friends. He described his sleep as okay, his energy as okay, and his appetite as good. Psychology will continue to follow while he is inpatient.     Problem Resolution:   Bladder Management: fluid intake encouraged  Strategies/Techniques (Bowel Management): upright positioning to facilitate evacuation  Communication Enhancement Strategies: call light answered in person  Activities: integration into community life(vacation home in [facility])  Counseling (Coping Strategies): active listening utilized   Identified Problems & Impairments: (not recorded)  Comment, Identified Problems & Impairments: (not recorded)  Resolved Problems & Impairments: (not recorded)  Comment, Resolved Problems & " Impairments: (not recorded) Team Weekly Outcome Statement: Alert oriented Continent   No complaints of pain. Staples wash daily  min A.    reg thins  BID accuchecks.   (08/26/20 1013)        Goals/Support System:  Patient/Family Goals  Patient's Goals For Discharge: take care of myself at home, return home, return to all previous roles/activities  Family Goals For Discharge: patient able to return to all previous activities/roles  Family/Support System  Health Advocacy: self-advocacy for health, family advocates for patient's health  Caregiver Training  Caregiver(s) to be Trained: spouse/significant other  Visit Schedule: to be arranged     IRF PT Goals      Most Recent Value   Bed Mobility Goal 1   Activity/Assistive Device  sit to supine/supine to sit, rolling to left, rolling to right at 08/26/2020 0836   [place]  modified independence at 08/26/2020 0836   Time Frame  short-term goal (STG), 1 week at 08/26/2020 0836   Progress/Outcome  goal met, goal revised this date at 08/26/2020 0836   Bed Mobility Goal 2   Activity/Assistive Device  sit to supine/supine to sit, rolling to left, rolling to right at 08/22/2020 1100   [place]  modified independence at 08/22/2020 1100   Time Frame  long-term goal (LTG), 21 days or less at 08/22/2020 1100   Progress/Outcome  goal ongoing at 08/26/2020 0836   Transfer Goal 1   Activity/Assistive Device  sit-to-stand/stand-to-sit, bed-to-chair/chair-to-bed, stand pivot, no assistive device at 08/26/2020 0836   [place]  supervision required, verbal cues required at 08/26/2020 0836   Time Frame  short-term goal (STG), 1 week at 08/26/2020 0836   Progress/Outcome  goal met, goal revised this date at 08/26/2020 0836   Transfer Goal 2   Activity/Assistive Device  sit-to-stand/stand-to-sit, bed-to-chair/chair-to-bed, car transfer, stand pivot at 08/22/2020 1100   [place]  modified independence at 08/22/2020 1100   Time Frame  long-term goal (LTG), 21 days or less at  08/22/2020 1100   Progress/Outcome  goal ongoing at 08/26/2020 0836   Gait/Walking Locomotion Goal 1   Activity/Assistive Device  gait (walking locomotion), no assistive device at 08/26/2020 0836   Distance  200 feet at 08/26/2020 0836   [place]  supervision required, verbal cues required at 08/26/2020 0836   Time Frame  short-term goal (STG), 1 week at 08/26/2020 0836   Progress/Outcome  goal met, goal revised this date at 08/26/2020 0836   Gait/Walking Locomotion Goal 2   Activity/Assistive Device  gait (walking locomotion) at 08/22/2020 1100   Distance  200 feet at 08/22/2020 1100   [place]  modified independence at 08/22/2020 1100   Time Frame  long-term goal (LTG), 21 days or less at 08/22/2020 1100   Progress/Outcome  goal ongoing at 08/26/2020 0836   Stairs Goal 1   Activity/Assistive Device  ascending stairs, descending stairs, using handrail, right, step-to-step at 08/26/2020 0836   Number of Stairs  12 at 08/26/2020 0836   [place]  supervision required, verbal cues required at 08/26/2020 0836   Time Frame  short-term goal (STG), 1 week at 08/26/2020 0836   Progress/Outcome  goal met, goal revised this date at 08/26/2020 0836   Stairs Goal 2   Activity/Assistive Device  ascending stairs, descending stairs, using handrail, right, step-to-step at 08/22/2020 1100   Number of Stairs  12 at 08/22/2020 1100   [place]  modified independence at 08/22/2020 1100   Time Frame  long-term goal (LTG), 21 days or less at 08/22/2020 1100   Progress/Outcome  goal ongoing at 08/26/2020 0836        IRF OT Goals      Most Recent Value   Transfer Goal 1   Activity/Assistive Device  toilet at 08/25/2020 1434   [place]  supervision required at 08/25/2020 1434   Time Frame  short-term goal (STG), 1 week at 08/25/2020 1434   Progress/Outcome  goal revised this date at 08/25/2020 1434   Transfer Goal 2   Activity/Assistive Device  toilet at 08/22/2020 0704   [place]  modified independence at 08/22/2020  0704   Time Frame  long-term goal (LTG), 4 weeks at 08/22/2020 0704   Progress/Outcome  goal ongoing at 08/25/2020 1434   Transfer Goal 3   Activity/Assistive Device  shower at 08/25/2020 1434   [place]  supervision required at 08/25/2020 1434   Time Frame  short-term goal (STG), 1 week at 08/25/2020 1434   Progress/Outcome  goal revised this date at 08/25/2020 1434   Transfer Goal 4   Activity/Assistive Device  shower at 08/22/2020 0704   [place]  modified independence at 08/22/2020 0704   Time Frame  long-term goal (LTG), 4 weeks at 08/22/2020 0704   Progress/Outcome  goal ongoing at 08/25/2020 1434   Bathing Goal 1   Activity/Assistive Device  bathing skills, all at 08/25/2020 1434   [place]  supervision required at 08/25/2020 1434   Time Frame  short-term goal (STG), 1 week at 08/25/2020 1434   Progress/Outcome  goal revised this date at 08/25/2020 1434   Bathing Goal 2   Activity/Assistive Device  bathing skills, all at 08/22/2020 0704   [place]  modified independence at 08/22/2020 0704   Time Frame  long-term goal (LTG), 4 weeks at 08/22/2020 0704   Progress/Outcome  goal ongoing at 08/25/2020 1434   UB Dressing Goal 1   Activity/Assistive Device  upper body dressing at 08/25/2020 1434   [place]  supervision required at 08/25/2020 1434   Time Frame  short-term goal (STG), 1 week at 08/25/2020 1434   Strategies/Barriers  including item retrieval at 08/25/2020 1434   Progress/Outcome  goal revised this date at 08/25/2020 1434   UB Dressing Goal 2   Activity/Assistive Device  upper body dressing at 08/22/2020 0704   [place]  modified independence at 08/22/2020 0704   Time Frame  long-term goal (LTG), 4 weeks at 08/22/2020 0704   Progress/Outcome  goal ongoing at 08/25/2020 1434   LB Dressing Goal 1   Activity/Assistive Device  lower body dressing at 08/25/2020 1434   [place]  supervision required at 08/25/2020 1434   Time Frame  short-term goal (STG), 1 week at 08/25/2020 1434    Strategies/Barriers  including item retrieval at 08/25/2020 1434   Progress/Outcome  goal revised this date at 08/25/2020 1434   LB Dressing Goal 2   Activity/Assistive Device  lower body dressing at 08/22/2020 0704   [place]  modified independence at 08/22/2020 0704   Time Frame  long-term goal (LTG), 4 weeks at 08/22/2020 0704   Progress/Outcome  goal ongoing at 08/25/2020 1434   Grooming Goal 1   Activity/Assistive Device  grooming skills, all at 08/25/2020 1434   [place]  supervision required at 08/25/2020 1434   Time Frame  short-term goal (STG), 1 week at 08/25/2020 1434   Progress/Outcome  goal revised this date at 08/25/2020 1434   Grooming Goal 2   Activity/Assistive Device  grooming skills, all at 08/22/2020 0704   [place]  modified independence at 08/22/2020 0704   Time Frame  long-term goal (LTG), 4 weeks at 08/22/2020 0704   Progress/Outcome  goal ongoing at 08/25/2020 1434   Toileting Goal 1   Activity/Assistive Device  toileting skills, all at 08/25/2020 1434   [place]  supervision required at 08/25/2020 1434   Time Frame  short-term goal (STG), 1 week at 08/25/2020 1434   Progress/Outcome  goal revised this date at 08/25/2020 1434   Toileting Goal 2   Activity/Assistive Device  toileting skills, all at 08/22/2020 0704   [place]  modified independence at 08/22/2020 0704   Time Frame  long-term goal (LTG), 4 weeks at 08/22/2020 0704   Progress/Outcome  goal ongoing at 08/25/2020 1434          Risk for Complications  DVT: Moderate  Falls: Moderate  Infection: Moderate      The patient's medical prognosis is excellent to achieve the stated goals below.    Expected Level of Function  Expected Functional Improvement: cognitive function;communication;mobility;motor dysfunction;safety;self-care  Self-Care: Independent  Sphincter Control: Independent  Transfers: Independent  Locomotion: Independent  Communication: Independent  Social Cognition: Independent       Discharge  Planning:  Anticipated Discharge Disposition: home with outpatient services;home with home health services  Type of Outpatient Services: physical therapy ([facility] )    Equipment/Device Needs at Discharge  Assistive Device/Animal Currently Used at Home: none  Discharge Planning  Does the patient need discharge transport arranged?: No    Anticipated Discharge Date: 8/29/2020    Needs Identified:  Community Reintegration  Activities: integration into community life(vacation home in [facility])  Barriers: type of disability      Team Members Present     Rehab Attending Present:  Brando Love DO    Care Coordinator Present:  Gabriela Solis LSW    Nurse Present:  Clau Fowler LPN    OT Present:  Bernie Pagan OT    PT Present:  [place], Leslie, PT    SLP Present:  Tessa Vincent CCC-SLP    Psychologist Present:  Johnna Allen PSY.D    Other Team Member Present:  Edna Schneider, RN        Next Team Conference Date: 09/02/20

## 2020-08-26 NOTE — PROGRESS NOTES
Patient: Guy Jarrett  Location: [facility] [address]  MRN: [id number]  Today's date: 8/26/2020    History of Present Illness  Guy is a 70 y.o. male admitted on 8/21/2020 with Brain metastases (CMS/HCC).     Pt is a 71 y/o male admitted to [facility] on 8/21/20 s/p craniotomy and mass resection. Pt with known metastatic melanoma diagnosed in August 2019 receiving chemotherapy diagnosed with brain metastasis on 4/22/2020 in the right temporal lobe treated with gamma knife on 5/5/2020 found to have rapidly enlarging right frontal metastases on subsequent surveillance imaging.  He was admitted to [facility] on 8/18/2020.  Dr. Barrera from neurosurgery was consulted and performed craniotomy and mass resection.    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    OT Vitals    Date/Time Pulse HR Source BP BP Location BP Method Pt Position [facility]   08/26/20 0710 46 Monitor 138/69 Left upper arm Automatic Lying DT      OT Pain    Date/Time Pain Type Pref Pain Scale Rating: Rest Rating: Activity [facility]   08/26/20 0710 Pain Assessment word (verbal rating pain scale) 0 - no pain 0 - no pain DT   08/26/20 0828 Post Activity word (verbal rating pain scale) 0 - no pain 0 - no pain DT          Prior Living Environment      Most Recent Value   Lives With  spouse   Living Arrangements  house [2SH]   Living Environment Comment  Pt lives with wife Malka,   Location, Kitchen  first (main) floor   Kitchen Access Comment  wife performs cooking tasks   Location, Laundry Room  second floor, must negotiate stairs to access   Laundry Room Access Comment  wife performs laundry (pt reports he assists at times)   Location, Patient Bedroom  second floor, must negotiate stairs to access   Location, Bathroom  second floor, must negotiate stairs to access   Bathroom Access  Comment  walk-in shower s GB, standard height toilet          Prior Level of Function      Most Recent Value   Dominant Hand  right   Ambulation  independent   Transferring  independent   Toileting  independent   Bathing  independent   Dressing  independent   Eating  independent   Prior Level of Function Comment  Independent PTA. (+) . Enjoys [employer] and household projects.   Assistive Device/Animal Currently Used at Home  none          IRF OT Evaluation and Treatment - 08/26/20 0709        Time Calculation    Start Time  0700     Stop Time  0830     Time Calculation (min)  90 min        Session Details    Document Type  daily treatment/progress note     Mode of Treatment  individual therapy;occupational therapy        Bed Mobility    [place], Supine to Sit  supervision        Sit to Stand Transfer    [place], Sit to Stand Transfer  close supervision     Verbal Cues  safety;hand placement     Assistive Device  none        Stand to Sit Transfer    [place], Stand to Sit Transfer  close supervision     Verbal Cues  safety     Assistive Device  cane, straight        Toilet Transfer    Transfer Technique  stand pivot     [place], Toilet Transfer  close supervision     Assistive Device  cane, straight;raised toilet seat;grab bars/safety frame     Comment  ambulatory approach, edu on toilet safety frame for home        Shower Transfer    Transfer Technique  stand pivot     [place], Shower Transfer  close supervision     Assistive Device  shower chair;grab bars/tub rail;cane, straight     Comment  ambulatory approach, CLS in simulated walk-in shower. edu on DME recommendation including shower chair and GB - pt agreeable        Safety Issues, Functional Mobility    Comment, Safety Issues/Impairments (Mobility)  CLS-MIN A ambulating c SPC, required cues for speed (MIN A with longer distances        Balance    Comment, Balance  CLS for item retrieval/item transport in stand with use of SPC         Bathing    Self-Performance  chest;left arm;right arm;front perineal area;abdomen;buttocks;left upper leg;right upper leg;left lower leg, including foot;right lower leg, including foot     [place]  close supervision;supervision     Position  unsupported sitting;supported standing     Setup Assistance  obtain supplies     Adaptive Equipment  grab bar/tub rail;hand-held shower spray hose;shower chair     Comment  S while seated, CLS in stand to wash buttocks        Upper Body Dressing    Self-Performance  don;obtains clothes;threads left arm, shirt;threads right arm, shirt;pulls shirt over head/around back;pulls shirt down/adjusts     [place]  supervision     Position  unsupported sitting;edge of bed sitting     Adaptive Equipment  none        Lower Body Dressing    Self-Performance  don;obtains clothes;threads left leg, underpants;threads right leg, underpants;pulls underpants up or down;threads left leg, pants/shorts;threads right leg, pants/shorts;pulls pants/shorts up or down;dons/doffs left sock;dons/doffs right sock;dons/doffs left shoe;dons/doffs right shoe;shoelaces, left;shoelaces, right     [place]  close supervision     Position  unsupported sitting;unsupported standing     Adaptive Equipment  none     [place], Footwear  supervision        Grooming    Self-Performance  washes, rinses and dries face;washes, rinses and dries hands;oral care (brushing teeth, cleaning dentures);brushes/clark hair;shaves face;applies deodorant     [place]  supervision     Position  unsupported standing;sink side     [place], Oral Hygiene  supervision        Toileting    [place]  close supervision;supervision     Position  unsupported standing;unsupported sitting     Adaptive Equipment  grab bar/safety frame;raised toilet seat     Comment  S while seated, CLS in stand for CM        Daily Progress Summary (OT)    Daily Outcome Statement (OT)  Pt improved in dressing, bathing, and groomnig  activities. Also participating in item retrieval and item transport level.                        Education provided this session. See the Patient Education summary report for full details.    IRF OT Goals      Most Recent Value   Transfer Goal 1   Activity/Assistive Device  toilet at 08/25/2020 1434   [place]  supervision required at 08/25/2020 1434   Time Frame  short-term goal (STG), 1 week at 08/25/2020 1434   Progress/Outcome  goal revised this date at 08/25/2020 1434   Transfer Goal 2   Activity/Assistive Device  toilet at 08/22/2020 0704   [place]  modified independence at 08/22/2020 0704   Time Frame  long-term goal (LTG), 4 weeks at 08/22/2020 0704   Progress/Outcome  goal ongoing at 08/25/2020 1434   Transfer Goal 3   Activity/Assistive Device  shower at 08/25/2020 1434   [place]  supervision required at 08/25/2020 1434   Time Frame  short-term goal (STG), 1 week at 08/25/2020 1434   Progress/Outcome  goal revised this date at 08/25/2020 1434   Transfer Goal 4   Activity/Assistive Device  shower at 08/22/2020 0704   [place]  modified independence at 08/22/2020 0704   Time Frame  long-term goal (LTG), 4 weeks at 08/22/2020 0704   Progress/Outcome  goal ongoing at 08/25/2020 1434   Bathing Goal 1   Activity/Assistive Device  bathing skills, all at 08/25/2020 1434   [place]  supervision required at 08/25/2020 1434   Time Frame  short-term goal (STG), 1 week at 08/25/2020 1434   Progress/Outcome  goal revised this date at 08/25/2020 1434   Bathing Goal 2   Activity/Assistive Device  bathing skills, all at 08/22/2020 0704   [place]  modified independence at 08/22/2020 0704   Time Frame  long-term goal (LTG), 4 weeks at 08/22/2020 0704   Progress/Outcome  goal ongoing at 08/25/2020 1434   UB Dressing Goal 1   Activity/Assistive Device  upper body dressing at 08/25/2020 1434   [place]  supervision required at 08/25/2020 1434   Time Frame  short-term goal (STG), 1 week at  08/25/2020 1434   Strategies/Barriers  including item retrieval at 08/25/2020 1434   Progress/Outcome  goal revised this date at 08/25/2020 1434   UB Dressing Goal 2   Activity/Assistive Device  upper body dressing at 08/22/2020 0704   [place]  modified independence at 08/22/2020 0704   Time Frame  long-term goal (LTG), 4 weeks at 08/22/2020 0704   Progress/Outcome  goal ongoing at 08/25/2020 1434   LB Dressing Goal 1   Activity/Assistive Device  lower body dressing at 08/25/2020 1434   [place]  supervision required at 08/25/2020 1434   Time Frame  short-term goal (STG), 1 week at 08/25/2020 1434   Strategies/Barriers  including item retrieval at 08/25/2020 1434   Progress/Outcome  goal revised this date at 08/25/2020 1434   LB Dressing Goal 2   Activity/Assistive Device  lower body dressing at 08/22/2020 0704   [place]  modified independence at 08/22/2020 0704   Time Frame  long-term goal (LTG), 4 weeks at 08/22/2020 0704   Progress/Outcome  goal ongoing at 08/25/2020 1434   Grooming Goal 1   Activity/Assistive Device  grooming skills, all at 08/25/2020 1434   [place]  supervision required at 08/25/2020 1434   Time Frame  short-term goal (STG), 1 week at 08/25/2020 1434   Progress/Outcome  goal revised this date at 08/25/2020 1434   Grooming Goal 2   Activity/Assistive Device  grooming skills, all at 08/22/2020 0704   [place]  modified independence at 08/22/2020 0704   Time Frame  long-term goal (LTG), 4 weeks at 08/22/2020 0704   Progress/Outcome  goal ongoing at 08/25/2020 1434   Toileting Goal 1   Activity/Assistive Device  toileting skills, all at 08/25/2020 1434   [place]  supervision required at 08/25/2020 1434   Time Frame  short-term goal (STG), 1 week at 08/25/2020 1434   Progress/Outcome  goal revised this date at 08/25/2020 1434   Toileting Goal 2   Activity/Assistive Device  toileting skills, all at 08/22/2020 0704   [place]  modified independence at 08/22/2020 0704    Time Frame  long-term goal (LTG), 4 weeks at 08/22/2020 0704   Progress/Outcome  goal ongoing at 08/25/2020 1418

## 2020-08-26 NOTE — ASSESSMENT & PLAN NOTE
Mr. Jarrett is a 70-year-old male with known metastatic melanoma diagnosed in August 2019 receiving chemotherapy diagnosed with brain metastasis on 4/22/2020 in the right temporal lobe treated with gamma knife on 5/5/2020 found to have rapidly enlarging right frontal metastases on subsequent surveillance imaging.  He was admitted to [facility] on 8/18/2020.  Dr. Barrera from neurosurgery was consulted and performed craniotomy and mass resection.  He was started on Keppra for intraoperative seizure.  Postoperatively he was started on Decadron taper.   He was cleared to initiate heparin for DVT prophylaxis.   Next dose Nivolumab due 9/15/20.  He was ultimately determined to medically stable for transfer to [facility] on 8/21/2020 for an acute inpatient rehabilitation program to address deficits related to metastatic melanoma with brain metastases status post craniotomy and resection.    He is weightbearing as tolerated.  He will participate in 3 hours of physical therapy, Occupational Therapy, and speech therapy as well as evaluation by psychology and 24-hour rehab nursing care.  We will monitor his scalp incision daily.  Remove scalp incision staples by 9/1/2020.  He will follow-up with his surgeon Dr. Barrera at [facility] in 4 to 6 weeks [phone].  Follow-up with his oncology NP Chyna Pugh 9/15/2020 10am [phone] for next round of chemo.

## 2020-08-26 NOTE — PROGRESS NOTES
Patient: Guy Jarrett  Location: [facility] [address]  MRN: [id number]  Today's date: 8/26/2020    History of Present Illness  Guy is a 70 y.o. male admitted on 8/21/2020 with Brain metastases (CMS/HCC).     Pt is a 69 y/o male admitted to [facility] on 8/21/20 s/p craniotomy and mass resection. Pt with known metastatic melanoma diagnosed in August 2019 receiving chemotherapy diagnosed with brain metastasis on 4/22/2020 in the right temporal lobe treated with gamma knife on 5/5/2020 found to have rapidly enlarging right frontal metastases on subsequent surveillance imaging.  He was admitted to [facility] on 8/18/2020.  Dr. Barrera from neurosurgery was consulted and performed craniotomy and mass resection.    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    PT Vitals    Date/Time Pulse BP BP Location BP Method Pt Position [facility]   08/26/20 0905 54 118/79 Left upper arm Automatic Sitting LB      PT Pain    Date/Time Rating: Rest [facility]   08/26/20 0905 0 - no pain LB   08/26/20 0955 0 - no pain LB          Prior Living Environment      Most Recent Value   Lives With  spouse   Living Arrangements  house [2SH]   Living Environment Comment  Pt lives with wife Malka,   Location, Kitchen  first (main) floor   Kitchen Access Comment  wife performs cooking tasks   Location, Laundry Room  second floor, must negotiate stairs to access   Laundry Room Access Comment  wife performs laundry (pt reports he assists at times)   Location, Patient Bedroom  second floor, must negotiate stairs to access   Location, Bathroom  second floor, must negotiate stairs to access   Bathroom Access Comment  walk-in shower s GB, standard height toilet          Prior Level of Function      Most Recent Value   Dominant Hand  right   Ambulation  independent   Transferring   "independent   Toileting  independent   Bathing  independent   Dressing  independent   Eating  independent   Prior Level of Function Comment  Independent PTA. (+) . Enjoys golf and household projects.   Assistive Device/Animal Currently Used at Home  none          IRF PT Evaluation and Treatment - 08/26/20 0905        Time Calculation    Start Time  0900     Stop Time  1000     Time Calculation (min)  60 min        Session Details    Document Type  daily treatment/progress note     Mode of Treatment  physical therapy;individual therapy        Sit to Stand Transfer    [place], Sit to Stand Transfer  close supervision;verbal cues     Verbal Cues  safety     Assistive Device  none        Stand to Sit Transfer    [place], Stand to Sit Transfer  close supervision;verbal cues     Verbal Cues  preparatory posture;safety     Assistive Device  none        Stand Pivot Transfer    [place], Stand Pivot/Stand Step Transfer  touching/steadying assist;verbal cues     Verbal Cues  safety     Assistive Device  none     Comment  ambulatory approach        Gait Training    [place], Gait  touching/steadying assist;close supervision;verbal cues     Assistive Device  none     Distance in Feet  400 feet    600', 400'    Deviations/Abnormal Patterns (Gait)  stride length decreased     Left Sided Gait Deviations  heel strike decreased     Comment  VC for heel-toe pattern and safe speed, good carryover        Curb Negotiation (Mobility)    [place]  touching/steadying assist;verbal cues     Comment  up/down 6+2\" curb without AD, VC for safety/sequencing        Sloped Surface Gait Skills (Mobility)    [place]  touching/steadying assist;verbal cues     Comment  up/down 5' ramp without AD, VC for safety        Stairs Training    [place], Stairs  close supervision;verbal cues     Assistive Device  railing     Handrail Location  right side (ascending);left side (descending)     Number of Stairs  16     Stair " "Height  6 inches     Ascending Stairs Technique  step-over-step     Descending Stairs Technique  step-to-step    LLE leading    Comment  VC for safety/sequencing        Balance    Comment, Balance  Mini squats on foam 10x2 Min A. Alt toe taps to 8\" block 20x2 Min A.         Aerobic Exercise    Type (Aerobic Exercise)  recumbent elliptical      Time Performed (Aerobic Exercise)  10     Comment (Aerobic Exercise)  NuStep BLE only @ level 6-7 with intermittent rest breaks d/t fatigue        Daily Progress Summary (PT)    Daily Outcome Statement (PT)  Patient with improving independence during gait without AD. Plan for continued mobility training without device. Will continue to benefit from LLE strengthening, balance training, and mobility training.               IRF PT Goals      Most Recent Value   Bed Mobility Goal 1   Activity/Assistive Device  sit to supine/supine to sit, rolling to left, rolling to right at 08/26/2020 0836   [place]  modified independence at 08/26/2020 0836   Time Frame  short-term goal (STG), 1 week at 08/26/2020 0836   Progress/Outcome  goal met, goal revised this date at 08/26/2020 0836   Bed Mobility Goal 2   Activity/Assistive Device  sit to supine/supine to sit, rolling to left, rolling to right at 08/22/2020 1100   [place]  modified independence at 08/22/2020 1100   Time Frame  long-term goal (LTG), 21 days or less at 08/22/2020 1100   Progress/Outcome  goal ongoing at 08/26/2020 0836   Transfer Goal 1   Activity/Assistive Device  sit-to-stand/stand-to-sit, bed-to-chair/chair-to-bed, stand pivot, no assistive device at 08/26/2020 0836   [place]  supervision required, verbal cues required at 08/26/2020 0836   Time Frame  short-term goal (STG), 1 week at 08/26/2020 0836   Progress/Outcome  goal met, goal revised this date at 08/26/2020 0836   Transfer Goal 2   Activity/Assistive Device  sit-to-stand/stand-to-sit, bed-to-chair/chair-to-bed, car transfer, stand pivot at " 08/22/2020 1100   [place]  modified independence at 08/22/2020 1100   Time Frame  long-term goal (LTG), 21 days or less at 08/22/2020 1100   Progress/Outcome  goal ongoing at 08/26/2020 0836   Gait/Walking Locomotion Goal 1   Activity/Assistive Device  gait (walking locomotion), no assistive device at 08/26/2020 0836   Distance  200 feet at 08/26/2020 0836   [place]  supervision required, verbal cues required at 08/26/2020 0836   Time Frame  short-term goal (STG), 1 week at 08/26/2020 0836   Progress/Outcome  goal met, goal revised this date at 08/26/2020 0836   Gait/Walking Locomotion Goal 2   Activity/Assistive Device  gait (walking locomotion) at 08/22/2020 1100   Distance  200 feet at 08/22/2020 1100   [place]  modified independence at 08/22/2020 1100   Time Frame  long-term goal (LTG), 21 days or less at 08/22/2020 1100   Progress/Outcome  goal ongoing at 08/26/2020 0836   Stairs Goal 1   Activity/Assistive Device  ascending stairs, descending stairs, using handrail, right, step-to-step at 08/26/2020 0836   Number of Stairs  12 at 08/26/2020 0836   [place]  supervision required, verbal cues required at 08/26/2020 0836   Time Frame  short-term goal (STG), 1 week at 08/26/2020 0836   Progress/Outcome  goal met, goal revised this date at 08/26/2020 0836   Stairs Goal 2   Activity/Assistive Device  ascending stairs, descending stairs, using handrail, right, step-to-step at 08/22/2020 1100   Number of Stairs  12 at 08/22/2020 1100   [place]  modified independence at 08/22/2020 1100   Time Frame  long-term goal (LTG), 21 days or less at 08/22/2020 1100   Progress/Outcome  goal ongoing at 08/26/2020 0836

## 2020-08-26 NOTE — PLAN OF CARE
Problem: Rehabilitation (IRF) Plan of Care  Goal: Plan of Care Review  Outcome: Progressing  Flowsheets (Taken 8/25/2020 2124)  Plan of Care Reviewed With: patient  IRF Plan of Care Review: progress ongoing, continue  Outcome Summary: Pt. AA&Ox3, pleasant and cooperative, in no acute distress, VSS, continent of bowel and bladder, denies pain, care and medications reviewed, fall/safety precautions maintained, SR x4, bed alarm activated, call bell in reach.

## 2020-08-26 NOTE — PROGRESS NOTES
Daily Progress Note    Patient was seen and examined.   Attestation Notes: Face to face encounter completed and patient discussed in team conference    Subjective     Interval History: has no complaint of headache, fever, chills, sweats, abd discomfort, n/v, new numbness, tingling or weakness.. spoke with nursing and  and there were no overnight issues reported.   History also provided by:     Objective     Vital signs in last 24 hours:  Temp:  [36.4 °C (97.6 °F)-36.6 °C (97.8 °F)] 36.4 °C (97.6 °F)  Heart Rate:  [46-54] 54  Resp:  [18-20] 18  BP: (101-138)/(58-79) 118/79    No intake or output data in the 24 hours ending 08/26/20 1018  Intake/Output this shift:  No intake/output data recorded.    Review of Systems:  All other systems reviewed and negative except as noted in the HPI.    Labs  No new labs.    Imaging  Not applicable    VTE Assessment: TBD    Full Code    Physical Exam  Physical Exam   Constitutional: He is oriented to person, place, and time. Vital signs are normal. He appears well-developed and well-nourished.   HENT:   Head: Normocephalic.       Eyes: Pupils are equal, round, and reactive to light. Conjunctivae and EOM are normal.   Cardiovascular: Normal rate and regular rhythm.   Pulmonary/Chest: Effort normal and breath sounds normal.   Abdominal: Soft. Normal appearance and bowel sounds are normal.   Genitourinary:   Genitourinary Comments: No purvis catheter   Musculoskeletal: Normal range of motion.   No jt erythema, warmth or effusion   Neurological: He is alert and oriented to person, place, and time. He displays no tremor. No cranial nerve deficit or sensory deficit. He exhibits normal muscle tone. He displays no seizure activity. Coordination and gait abnormal.   Fluent, follows commands, strength LLE improving now grossly >3/5 throughout, tone 0/4.  Impaired coordination L side.   Skin: Skin is warm, dry and intact.   Psychiatric: He has a normal mood and affect. His speech is normal and  behavior is normal. Cognition and memory are impaired.           Plan of care was discussed with patient    Assessment & Plan  Follow up  Assessment & Plan  PCP after discharge  Neurosurgeon Dr. Beau Barrera at [facility] in 4 to 6 weeks [phone].    Oncology NP Chyna Pugh 9/15/2020 10am [phone] for next round of chemo.    Pain  Assessment & Plan  Tylenol, Tylenol 3 as needed    Seizure disorder (CMS/HCC)  Assessment & Plan  Continue Keppra    Risk for falls  Assessment & Plan  Wean off restraints as able    At high risk for pressure injury of skin  Assessment & Plan  Turns every 2 hours, Desitin to sacral area    At high risk for deep venous thrombosis  Assessment & Plan  SC heparin    Hyperlipidemia  Assessment & Plan  Continue lipitor    HTN (hypertension)  Assessment & Plan  Continue lisinopril    Metastatic melanoma (CMS/HCC)  Assessment & Plan  Mr. Jarrett is a 70-year-old male with known metastatic melanoma diagnosed in August 2019 receiving chemotherapy diagnosed with brain metastasis on 4/22/2020 in the right temporal lobe treated with gamma knife on 5/5/2020 found to have rapidly enlarging right frontal metastases on subsequent surveillance imaging.  He was admitted to [facility] on 8/18/2020.  Dr. Barrera from neurosurgery was consulted and performed craniotomy and mass resection.  He was started on Keppra for intraoperative seizure.  Postoperatively he was started on Decadron taper.   He was cleared to initiate heparin for DVT prophylaxis.   Next dose Nivolumab due 9/15/20.  He was ultimately determined to medically stable for transfer to [facility] on 8/21/2020 for an acute inpatient rehabilitation program to address deficits related to metastatic melanoma with brain metastases status post craniotomy and resection.    He is weightbearing as tolerated.  He will participate in 3 hours of physical therapy, Occupational Therapy, and speech therapy as well as evaluation by psychology and 24-hour rehab nursing  care.  We will monitor his scalp incision daily.  Remove scalp incision staples by 9/1/2020.  He will follow-up with his surgeon Dr. Barrera at [facility] in 4 to 6 weeks [phone].  Follow-up with his oncology NP Chyna Pugh 9/15/2020 10am [phone] for next round of chemo.        Expected Discharge Date:  8/29/2020  home

## 2020-08-26 NOTE — PROGRESS NOTES
Patient: Guy Jarrett  Location: [facility] [address]  MRN: [id number]  Today's date: 8/26/2020    History of Present Illness  Guy is a 70 y.o. male admitted on 8/21/2020 with Brain metastases (CMS/HCC).     Pt is a 69 y/o male admitted to [facility] on 8/21/20 s/p craniotomy and mass resection. Pt with known metastatic melanoma diagnosed in August 2019 receiving chemotherapy diagnosed with brain metastasis on 4/22/2020 in the right temporal lobe treated with gamma knife on 5/5/2020 found to have rapidly enlarging right frontal metastases on subsequent surveillance imaging.  He was admitted to [facility] on 8/18/2020.  Dr. Barrera from neurosurgery was consulted and performed craniotomy and mass resection.    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    PT Vitals    Date/Time Pulse HR Source BP [facility]   08/26/20 1332 52 Monitor 113/64 TL      PT Pain    Date/Time Pain Type Pref Pain Scale Rating: Activity [facility]   08/26/20 1332 Pain Assessment number (Numeric Rating Pain Scale) 0 TL          Prior Living Environment      Most Recent Value   Lives With  spouse   Living Arrangements  house [2SH]   Living Environment Comment  Pt lives with wife Malka,   Location, Kitchen  first (main) floor   Kitchen Access Comment  wife performs cooking tasks   Location, Laundry Room  second floor, must negotiate stairs to access   Laundry Room Access Comment  wife performs laundry (pt reports he assists at times)   Location, Patient Bedroom  second floor, must negotiate stairs to access   Location, Bathroom  second floor, must negotiate stairs to access   Bathroom Access Comment  walk-in shower s GB, standard height toilet          Prior Level of Function      Most Recent Value   Dominant Hand  right   Ambulation  independent   Transferring  independent    Toileting  independent   Bathing  independent   Dressing  independent   Eating  independent   Prior Level of Function Comment  Independent PTA. (+) . Enjoys golf and household projects.   Assistive Device/Animal Currently Used at Home  none          IRF PT Evaluation and Treatment - 08/26/20 1331        Time Calculation    Start Time  1330    Stop Time  1400     Time Calculation (min)  30 min        Session Details    Document Type  daily treatment/progress note     Mode of Treatment  individual therapy;physical therapy        General Information    Patient Profile Reviewed?  yes     General Observations of Patient  No reports of pain during therapy session.        Sit to Stand Transfer    [place], Sit to Stand Transfer  close supervision     Verbal Cues  safety     Assistive Device  none     Comment  Cl S w/o AD        Stand to Sit Transfer    [place], Stand to Sit Transfer  close supervision     Verbal Cues  safety     Assistive Device  none     Comment  Cl S w/o AD        Stand Pivot Transfer    [place], Stand Pivot/Stand Step Transfer  touching/steadying assist     Verbal Cues  safety     Assistive Device  none        Gait Training    [place], Gait  touching/steadying assist     Assistive Device  none     Distance in Feet  400 feet     Gait Pattern Utilized  step-through     Deviations/Abnormal Patterns (Gait)  stride length decreased     Left Sided Gait Deviations  heel strike decreased     Comment  vc's for pacing, even strep length and heel strike        Balance    Comment, Balance  Standing balance training with target cone tapping with named foot and color with light fingertip support needed        Lower Extremity (Therapeutic Exercise)    Exercise Position/Type (LE Therapeutic Exercise)  standing;AROM (active range of motion)     Reps and Sets (LE Therapeutic Exercise)  10     Comment (LE Therapeutic Exercise)  Heel raises, partial squats, hip abd, hip flex, hams curls         Daily Progress Summary (PT)    Daily Outcome Statement (PT)  Pt challanged with L foot target cone tapping with decreased grading of mov't and need for some fingertip support for balance.                            IRF PT Goals      Most Recent Value   Bed Mobility Goal 1   Activity/Assistive Device  sit to supine/supine to sit, rolling to left, rolling to right at 08/26/2020 0836   [place]  modified independence at 08/26/2020 0836   Time Frame  short-term goal (STG), 1 week at 08/26/2020 0836   Progress/Outcome  goal met, goal revised this date at 08/26/2020 0836   Bed Mobility Goal 2   Activity/Assistive Device  sit to supine/supine to sit, rolling to left, rolling to right at 08/22/2020 1100   [place]  modified independence at 08/22/2020 1100   Time Frame  long-term goal (LTG), 21 days or less at 08/22/2020 1100   Progress/Outcome  goal ongoing at 08/26/2020 0836   Transfer Goal 1   Activity/Assistive Device  sit-to-stand/stand-to-sit, bed-to-chair/chair-to-bed, stand pivot, no assistive device at 08/26/2020 0836   [place]  supervision required, verbal cues required at 08/26/2020 0836   Time Frame  short-term goal (STG), 1 week at 08/26/2020 0836   Progress/Outcome  goal met, goal revised this date at 08/26/2020 0836   Transfer Goal 2   Activity/Assistive Device  sit-to-stand/stand-to-sit, bed-to-chair/chair-to-bed, car transfer, stand pivot at 08/22/2020 1100   [place]  modified independence at 08/22/2020 1100   Time Frame  long-term goal (LTG), 21 days or less at 08/22/2020 1100   Progress/Outcome  goal ongoing at 08/26/2020 0836   Gait/Walking Locomotion Goal 1   Activity/Assistive Device  gait (walking locomotion), no assistive device at 08/26/2020 0836   Distance  200 feet at 08/26/2020 0836   [place]  supervision required, verbal cues required at 08/26/2020 0836   Time Frame  short-term goal (STG), 1 week at 08/26/2020 0836   Progress/Outcome  goal met, goal revised this date at  08/26/2020 0836   Gait/Walking Locomotion Goal 2   Activity/Assistive Device  gait (walking locomotion) at 08/22/2020 1100   Distance  200 feet at 08/22/2020 1100   [place]  modified independence at 08/22/2020 1100   Time Frame  long-term goal (LTG), 21 days or less at 08/22/2020 1100   Progress/Outcome  goal ongoing at 08/26/2020 0836   Stairs Goal 1   Activity/Assistive Device  ascending stairs, descending stairs, using handrail, right, step-to-step at 08/26/2020 0836   Number of Stairs  12 at 08/26/2020 0836   [place]  supervision required, verbal cues required at 08/26/2020 0836   Time Frame  short-term goal (STG), 1 week at 08/26/2020 0836   Progress/Outcome  goal met, goal revised this date at 08/26/2020 0836   Stairs Goal 2   Activity/Assistive Device  ascending stairs, descending stairs, using handrail, right, step-to-step at 08/22/2020 1100   Number of Stairs  12 at 08/22/2020 1100   [place]  modified independence at 08/22/2020 1100   Time Frame  long-term goal (LTG), 21 days or less at 08/22/2020 1100   Progress/Outcome  goal ongoing at 08/26/2020 0836

## 2020-08-26 NOTE — PLAN OF CARE
Problem: Rehabilitation (IRF) Plan of Care  Goal: Plan of Care Review  Outcome: Progressing  Flowsheets (Taken 8/26/2020 0889)  Plan of Care Reviewed With: patient  IRF Plan of Care Review: progress ongoing, continue  Outcome Summary: Pt improved in dressing, bathing, and groomnig activities. Also participating in item retrieval and item transport level.

## 2020-08-26 NOTE — ASSESSMENT & PLAN NOTE
PCP after discharge  Neurosurgeon Dr. Beau Barrera at [facility] in 4 to 6 weeks [phone].    Oncology NP Chyna Pugh 9/15/2020 10am [phone] for next round of chemo.

## 2020-08-27 ENCOUNTER — APPOINTMENT (INPATIENT)
Dept: PHYSICAL THERAPY | Facility: REHABILITATION | Age: 70
DRG: 949 | End: 2020-08-27
Payer: COMMERCIAL

## 2020-08-27 ENCOUNTER — APPOINTMENT (INPATIENT)
Dept: OCCUPATIONAL THERAPY | Facility: REHABILITATION | Age: 70
DRG: 949 | End: 2020-08-27
Payer: COMMERCIAL

## 2020-08-27 LAB
GLUCOSE BLD-MCNC: 108 MG/DL (ref 70–99)
GLUCOSE BLD-MCNC: 119 MG/DL (ref 70–99)
POCT TEST: ABNORMAL
POCT TEST: ABNORMAL

## 2020-08-27 PROCEDURE — 97110 THERAPEUTIC EXERCISES: CPT | Mod: GP

## 2020-08-27 PROCEDURE — 97116 GAIT TRAINING THERAPY: CPT | Mod: GP

## 2020-08-27 PROCEDURE — 63600000 HC DRUGS/DETAIL CODE: Performed by: STUDENT IN AN ORGANIZED HEALTH CARE EDUCATION/TRAINING PROGRAM

## 2020-08-27 PROCEDURE — 97530 THERAPEUTIC ACTIVITIES: CPT | Mod: GP

## 2020-08-27 PROCEDURE — 63700000 HC SELF-ADMINISTRABLE DRUG: Performed by: HOSPITALIST

## 2020-08-27 PROCEDURE — 97535 SELF CARE MNGMENT TRAINING: CPT | Mod: GO

## 2020-08-27 PROCEDURE — 12800001 HC ROOM AND CARE SEMIPRIVATE REHAB-BRAIN INJ

## 2020-08-27 PROCEDURE — 63700000 HC SELF-ADMINISTRABLE DRUG: Performed by: PHYSICAL MEDICINE & REHABILITATION

## 2020-08-27 PROCEDURE — 63600000 HC DRUGS/DETAIL CODE: Performed by: PHYSICAL MEDICINE & REHABILITATION

## 2020-08-27 PROCEDURE — 97530 THERAPEUTIC ACTIVITIES: CPT | Mod: GO

## 2020-08-27 RX ORDER — ATORVASTATIN CALCIUM 10 MG/1
10 TABLET, FILM COATED ORAL DAILY
Qty: 30 TABLET | Refills: 0 | Status: SHIPPED | OUTPATIENT
Start: 2020-08-27 | End: 2020-09-26

## 2020-08-27 RX ORDER — DEXAMETHASONE 2 MG/1
2 TABLET ORAL DAILY
Qty: 2 TABLET | Refills: 0 | Status: SHIPPED | OUTPATIENT
Start: 2020-09-03 | End: 2020-09-05

## 2020-08-27 RX ORDER — DEXAMETHASONE 2 MG/1
2 TABLET ORAL EVERY 12 HOURS
Qty: 6 TABLET | Refills: 0 | Status: SHIPPED | OUTPATIENT
Start: 2020-08-30 | End: 2020-09-02

## 2020-08-27 RX ORDER — DEXAMETHASONE 4 MG/1
4 TABLET ORAL EVERY 12 HOURS
Qty: 6 TABLET | Refills: 0 | Status: SHIPPED | OUTPATIENT
Start: 2020-08-27 | End: 2020-08-30

## 2020-08-27 RX ORDER — ACETAMINOPHEN 325 MG/1
650 TABLET ORAL EVERY 4 HOURS PRN
Qty: 30 TABLET | Refills: 0 | Status: SHIPPED | OUTPATIENT
Start: 2020-08-27 | End: 2020-09-26

## 2020-08-27 RX ORDER — LISINOPRIL 10 MG/1
10 TABLET ORAL DAILY
Qty: 30 TABLET | Refills: 0 | Status: SHIPPED | OUTPATIENT
Start: 2020-08-27 | End: 2020-09-26

## 2020-08-27 RX ORDER — LEVETIRACETAM 1000 MG/1
1000 TABLET ORAL 2 TIMES DAILY
Qty: 60 TABLET | Refills: 0 | Status: SHIPPED | OUTPATIENT
Start: 2020-08-27 | End: 2020-09-26

## 2020-08-27 RX ORDER — ONDANSETRON 4 MG/1
4 TABLET, ORALLY DISINTEGRATING ORAL EVERY 8 HOURS PRN
Qty: 20 TABLET | Refills: 0 | Status: SHIPPED | OUTPATIENT
Start: 2020-08-27 | End: 2020-09-26

## 2020-08-27 RX ADMIN — LEVETIRACETAM 1000 MG: 500 TABLET ORAL at 21:40

## 2020-08-27 RX ADMIN — ATORVASTATIN CALCIUM 10 MG: 10 TABLET, FILM COATED ORAL at 07:28

## 2020-08-27 RX ADMIN — HEPARIN SODIUM 5000 UNITS: 5000 INJECTION, SOLUTION INTRAVENOUS; SUBCUTANEOUS at 13:06

## 2020-08-27 RX ADMIN — LEVETIRACETAM 1000 MG: 500 TABLET ORAL at 07:27

## 2020-08-27 RX ADMIN — HEPARIN SODIUM 5000 UNITS: 5000 INJECTION, SOLUTION INTRAVENOUS; SUBCUTANEOUS at 21:40

## 2020-08-27 RX ADMIN — PANTOPRAZOLE SODIUM 40 MG: 40 TABLET, DELAYED RELEASE ORAL at 07:28

## 2020-08-27 RX ADMIN — LISINOPRIL 10 MG: 10 TABLET ORAL at 07:28

## 2020-08-27 RX ADMIN — DEXAMETHASONE 4 MG: 4 TABLET ORAL at 07:28

## 2020-08-27 RX ADMIN — DEXAMETHASONE 4 MG: 4 TABLET ORAL at 21:40

## 2020-08-27 RX ADMIN — HEPARIN SODIUM 5000 UNITS: 5000 INJECTION, SOLUTION INTRAVENOUS; SUBCUTANEOUS at 06:04

## 2020-08-27 NOTE — PROGRESS NOTES
Patient: Guy Jarrett  Location: [facility] [address]  MRN: [id number]  Today's date: 8/27/2020    History of Present Illness  Guy is a 70 y.o. male admitted on 8/21/2020 with Brain metastases (CMS/HCC).     Pt is a 71 y/o male admitted to [facility] on 8/21/20 s/p craniotomy and mass resection. Pt with known metastatic melanoma diagnosed in August 2019 receiving chemotherapy diagnosed with brain metastasis on 4/22/2020 in the right temporal lobe treated with gamma knife on 5/5/2020 found to have rapidly enlarging right frontal metastases on subsequent surveillance imaging.  He was admitted to [facility] on 8/18/2020.  Dr. Barrera from neurosurgery was consulted and performed craniotomy and mass resection.    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    OT Vitals    Date/Time Pulse HR Source BP BP Location BP Method Pt Position [facility]   08/27/20 1101 49 Monitor 106/59 Left upper arm Automatic Sitting DT   08/27/20 1154 49 Monitor 108/56 Left upper arm Automatic Sitting DT      OT Pain    Date/Time Pain Type Pref Pain Scale Rating: Rest Rating: Activity [facility]   08/27/20 1101 Pain Assessment word (verbal rating pain scale) 0 - no pain 0 - no pain DT   08/27/20 1154 Post Activity word (verbal rating pain scale) 0 - no pain 0 - no pain DT          Prior Living Environment      Most Recent Value   Lives With  spouse   Living Arrangements  house [2SH]   Living Environment Comment  Pt lives with wife Malka,   Location, Kitchen  first (main) floor   Kitchen Access Comment  wife performs cooking tasks   Location, Laundry Room  second floor, must negotiate stairs to access   Laundry Room Access Comment  wife performs laundry (pt reports he assists at times)   Location, Patient Bedroom  second floor, must negotiate stairs to access   Location,  Bathroom  second floor, must negotiate stairs to access   Bathroom Access Comment  walk-in shower s GB, standard height toilet          Prior Level of Function      Most Recent Value   Dominant Hand  right   Ambulation  independent   Transferring  independent   Toileting  independent   Bathing  independent   Dressing  independent   Eating  independent   Prior Level of Function Comment  Independent PTA. (+) . Enjoys golf and household projects.   Assistive Device/Animal Currently Used at Home  none          IRF OT Evaluation and Treatment - 08/27/20 1101        Time Calculation    Start Time  1100     Stop Time  1200     Time Calculation (min)  60 min        Session Details    Document Type  daily treatment/progress note     Mode of Treatment  individual therapy;occupational therapy        Sit to Stand Transfer    [place], Sit to Stand Transfer  supervision     Verbal Cues  safety     Assistive Device  none        Stand to Sit Transfer    [place], Stand to Sit Transfer  supervision     Verbal Cues  safety     Assistive Device  none        Stand Pivot Transfer    [place], Stand Pivot/Stand Step Transfer  supervision     Verbal Cues  safety     Assistive Device  none     Comment  ambulatory approach to and from w/c and bench        Safety Issues, Functional Mobility    Comment, Safety Issues/Impairments (Mobility)  S ambulating on unit s AD, CLS ambulating on uneven terrain outdoors.        Balance    Comment, Balance  Pt engaged in leisure activity of golf. First, pt putting golf ball for short distances. Able ot complete with S-DS. Pt then driving ball. Therapist kneeling to L of patient and blocking L knee for safety with MOD A at pelvis to faciliate appropriate weight shifting. After completion, pt reporting feeling nervous about WBing through LLE during swing. Pt then positioned self next to wall with LUE WBing in superior plane (on wall), and faciliating appropriate weight shifting/pelvic  rotation completed when driving golf ball. Pt expressed feeling more confident with ability to support himself with LUE.         Daily Progress Summary (OT)    Daily Outcome Statement (OT)  Pt engaged in leisure activity of golfing focusing on appropriate weight shifting and pelvic rotation when driving.                            IRF OT Goals      Most Recent Value   Transfer Goal 1   Activity/Assistive Device  toilet at 08/25/2020 1434   [place]  supervision required at 08/25/2020 1434   Time Frame  short-term goal (STG), 1 week at 08/25/2020 1434   Progress/Outcome  goal revised this date at 08/25/2020 1434   Transfer Goal 2   Activity/Assistive Device  toilet at 08/22/2020 0704   [place]  modified independence at 08/22/2020 0704   Time Frame  long-term goal (LTG), 4 weeks at 08/22/2020 0704   Progress/Outcome  goal ongoing at 08/25/2020 1434   Transfer Goal 3   Activity/Assistive Device  shower at 08/25/2020 1434   [place]  supervision required at 08/25/2020 1434   Time Frame  short-term goal (STG), 1 week at 08/25/2020 1434   Progress/Outcome  goal revised this date at 08/25/2020 1434   Transfer Goal 4   Activity/Assistive Device  shower at 08/22/2020 0704   [place]  modified independence at 08/22/2020 0704   Time Frame  long-term goal (LTG), 4 weeks at 08/22/2020 0704   Progress/Outcome  goal ongoing at 08/25/2020 1434   Bathing Goal 1   Activity/Assistive Device  bathing skills, all at 08/25/2020 1434   [place]  supervision required at 08/25/2020 1434   Time Frame  short-term goal (STG), 1 week at 08/25/2020 1434   Progress/Outcome  goal revised this date at 08/25/2020 1434   Bathing Goal 2   Activity/Assistive Device  bathing skills, all at 08/22/2020 0704   [place]  modified independence at 08/22/2020 0704   Time Frame  long-term goal (LTG), 4 weeks at 08/22/2020 0704   Progress/Outcome  goal ongoing at 08/25/2020 1434   UB Dressing Goal 1   Activity/Assistive Device  upper  body dressing at 08/25/2020 1434   [place]  supervision required at 08/25/2020 1434   Time Frame  short-term goal (STG), 1 week at 08/25/2020 1434   Strategies/Barriers  including item retrieval at 08/25/2020 1434   Progress/Outcome  goal revised this date at 08/25/2020 1434   UB Dressing Goal 2   Activity/Assistive Device  upper body dressing at 08/22/2020 0704   [place]  modified independence at 08/22/2020 0704   Time Frame  long-term goal (LTG), 4 weeks at 08/22/2020 0704   Progress/Outcome  goal ongoing at 08/25/2020 1434   LB Dressing Goal 1   Activity/Assistive Device  lower body dressing at 08/25/2020 1434   [place]  supervision required at 08/25/2020 1434   Time Frame  short-term goal (STG), 1 week at 08/25/2020 1434   Strategies/Barriers  including item retrieval at 08/25/2020 1434   Progress/Outcome  goal revised this date at 08/25/2020 1434   LB Dressing Goal 2   Activity/Assistive Device  lower body dressing at 08/22/2020 0704   [place]  modified independence at 08/22/2020 0704   Time Frame  long-term goal (LTG), 4 weeks at 08/22/2020 0704   Progress/Outcome  goal ongoing at 08/25/2020 1434   Grooming Goal 1   Activity/Assistive Device  grooming skills, all at 08/25/2020 1434   [place]  supervision required at 08/25/2020 1434   Time Frame  short-term goal (STG), 1 week at 08/25/2020 1434   Progress/Outcome  goal revised this date at 08/25/2020 1434   Grooming Goal 2   Activity/Assistive Device  grooming skills, all at 08/22/2020 0704   [place]  modified independence at 08/22/2020 0704   Time Frame  long-term goal (LTG), 4 weeks at 08/22/2020 0704   Progress/Outcome  goal ongoing at 08/25/2020 1434   Toileting Goal 1   Activity/Assistive Device  toileting skills, all at 08/25/2020 1434   [place]  supervision required at 08/25/2020 1434   Time Frame  short-term goal (STG), 1 week at 08/25/2020 1434   Progress/Outcome  goal revised this date at 08/25/2020 1434   Toileting  Goal 2   Activity/Assistive Device  toileting skills, all at 08/22/2020 0704   [place]  modified independence at 08/22/2020 0704   Time Frame  long-term goal (LTG), 4 weeks at 08/22/2020 0704   Progress/Outcome  goal ongoing at 08/25/2020 4736

## 2020-08-27 NOTE — PROGRESS NOTES
08/27/20 1000   Discharge Needs Assessment (IRF)   Concerns to be Addressed discharge planning   Patient/Family Anticipated Services at Transition rehabilitation services;outpatient care   Transportation Concerns car, none   Offered/Gave Vendor List other (see comments)  (Pt known to [facility] and wants to resume care)   Patient's Choice of Community Agency(s) [facility]   Does the patient need discharge transport arranged? Yes   Has discharge transport been arranged? Yes   Discharge Transportation Vendor private vehicle   What day is the transport expected? 08/29/20   What time is the transport expected? 1000   Plan   Plan Home OP   Patient/Family in Agreement with Plan yes   Discharge Review for Designated Lay Caregiver Designated Lay Caregiver available

## 2020-08-27 NOTE — PROGRESS NOTES
Daily Progress Note    Patient was seen and examined.   Attestation Notes: Face to face encounter completed    Subjective     Interval History:  Patient seen this morning sitting at bedside in his wheelchair eating breakfast and says he has no complaint of headache, fever, chills, sweats, abd discomfort, n/v, new numbness, tingling or weakness.. spoke with nursing and  and there were no overnight issues reported.  Sutures to be taken out tomorrow    Objective     Vital signs in last 24 hours:  Temp:  [36.5 °C (97.7 °F)-37.1 °C (98.7 °F)] 37.1 °C (98.7 °F)  Heart Rate:  [52-58] 52  Resp:  [16-20] 20  BP: ()/(56-64) 107/58    No intake or output data in the 24 hours ending 08/27/20 1031  Intake/Output this shift:  No intake/output data recorded.    Review of Systems:  All other systems reviewed and negative except as noted in the HPI.    Labs  No new labs.    Imaging  Not applicable    VTE Assessment: TBD    Full Code    Physical Exam   Constitutional: He is oriented to person, place, and time. Vital signs are normal. He appears well-developed and well-nourished.   HENT:   Head: Normocephalic.       Eyes: Pupils are equal, round, and reactive to light. Conjunctivae and EOM are normal.   Cardiovascular: Normal rate and regular rhythm.   Pulmonary/Chest: Effort normal and breath sounds normal.   Abdominal: Soft. Normal appearance and bowel sounds are normal.   Genitourinary:   Genitourinary Comments: No purvis catheter   Musculoskeletal: Normal range of motion.   No jt erythema, warmth or effusion   Neurological: He is alert and oriented to person, place, and time. He displays no tremor. No cranial nerve deficit or sensory deficit. He exhibits normal muscle tone. He displays no seizure activity. Coordination and gait abnormal.   Fluent, follows commands, strength LLE improving now grossly >3/5 throughout, tone 0/4.  Impaired coordination L side.   Skin: Skin is warm, dry and intact.   Psychiatric: He has a normal  mood and affect. His speech is normal and behavior is normal. Cognition and memory are impaired.      Plan of care was discussed with patient    Assessment & Plan  Follow up  Assessment & Plan  PCP after discharge  Neurosurgeon Dr. Beau Barrera at [facility] in 4 to 6 weeks [phone].    Oncology NP Chyna Pugh 9/15/2020 10am [phone] for next round of chemo.    Pain  Assessment & Plan  Tylenol, Tylenol 3 as needed    Seizure disorder (CMS/HCC)  Assessment & Plan  Continue Keppra    Risk for falls  Assessment & Plan  Wean off restraints as able    At high risk for pressure injury of skin  Assessment & Plan  Turns every 2 hours, Desitin to sacral area    At high risk for deep venous thrombosis  Assessment & Plan  SC heparin    Hyperlipidemia  Assessment & Plan  Continue lipitor    HTN (hypertension)  Assessment & Plan  Continue lisinopril    Metastatic melanoma (CMS/HCC)  Assessment & Plan  Mr. Jarrett is a 70-year-old male with known metastatic melanoma diagnosed in August 2019 receiving chemotherapy diagnosed with brain metastasis on 4/22/2020 in the right temporal lobe treated with gamma knife on 5/5/2020 found to have rapidly enlarging right frontal metastases on subsequent surveillance imaging.  He was admitted to [facility] on 8/18/2020.  Dr. Barrera from neurosurgery was consulted and performed craniotomy and mass resection.  He was started on Keppra for intraoperative seizure.  Postoperatively he was started on Decadron taper.   He was cleared to initiate heparin for DVT prophylaxis.   Next dose Nivolumab due 9/15/20.  He was ultimately determined to medically stable for transfer to [facility] on 8/21/2020 for an acute inpatient rehabilitation program to address deficits related to metastatic melanoma with brain metastases status post craniotomy and resection.    He is weightbearing as tolerated.  He will participate in 3 hours of physical therapy, Occupational Therapy, and speech therapy as well as evaluation by  psychology and 24-hour rehab nursing care.  We will monitor his scalp incision daily.  Remove scalp incision staples by 9/1/2020.  He will follow-up with his surgeon Dr. Barrera at [facility] in 4 to 6 weeks [phone].  Follow-up with his oncology NP Chyna Pugh 9/15/2020 10am [phone] for next round of chemo.        Expected Discharge Date:  8/29/2020      Dr. Kapil Hidalgo, PGY-2  Case discussed with TG Ambrose

## 2020-08-27 NOTE — PLAN OF CARE
Problem: Rehabilitation (IRF) Plan of Care  Goal: Plan of Care Review  Outcome: Unable to meet, plan of care revised  Flowsheets (Taken 8/26/2020 2120)  Plan of Care Reviewed With: patient  IRF Plan of Care Review: progress ongoing, continue  Outcome Summary: Pt. AA&Ox3, pleasant and cooperative, very motivated, VSS, continent of bowel and bladder, denies pain, care and medications reviewed, fall/safety precautions maintained, bed alarm activated, call bell in reach.

## 2020-08-27 NOTE — ASSESSMENT & PLAN NOTE
Mr. Jarrett is a 70-year-old male with known metastatic melanoma diagnosed in August 2019 receiving chemotherapy diagnosed with brain metastasis on 4/22/2020 in the right temporal lobe treated with gamma knife on 5/5/2020 found to have rapidly enlarging right frontal metastases on subsequent surveillance imaging.  He was admitted to [facility] on 8/18/2020.  Dr. Barrera from neurosurgery was consulted and performed craniotomy and mass resection.  He was started on Keppra for intraoperative seizure.  Postoperatively he was started on Decadron taper.   He was cleared to initiate heparin for DVT prophylaxis.   Next dose Nivolumab due 9/15/20.  He was ultimately determined to medically stable for transfer to [facility] on 8/21/2020 for an acute inpatient rehabilitation program to address deficits related to metastatic melanoma with brain metastases status post craniotomy and resection.    He is weightbearing as tolerated.  He will participate in 3 hours of physical therapy, Occupational Therapy, and speech therapy as well as evaluation by psychology and 24-hour rehab nursing care.  We will monitor his scalp incision daily.  Remove scalp incision staples by 9/1/2020.  He will follow-up with his surgeon Dr. Barrera at [facility] in 4 to 6 weeks [phone].  Follow-up with his oncology NP Chyna Pugh 9/15/2020 10am [phone] for next round of chemo.

## 2020-08-27 NOTE — PLAN OF CARE
Problem: Rehabilitation (IRF) Plan of Care  Goal: Plan of Care Review  Outcome: Progressing  Flowsheets (Taken 8/27/2020 1231)  Plan of Care Reviewed With: patient  IRF Plan of Care Review: progress ongoing, continue  Outcome Summary: Pt engaged in leisure activity of golfing focusing on appropriate weight shifting and pelvic rotation when driving.

## 2020-08-27 NOTE — PLAN OF CARE
Problem: Rehabilitation (IRF) Plan of Care  Goal: Plan of Care Review  Flowsheets (Taken 8/27/2020 1240)  Plan of Care Reviewed With: patient  IRF Plan of Care Review: progress ongoing, continue  Outcome Summary: Continues to demonstrate improving LLE strength and resulting increased independence with mobility.

## 2020-08-27 NOTE — SUBJECTIVE & OBJECTIVE
Patient was seen and examined.   Attestation Notes: Face to face encounter completed    Subjective     Interval History:  Patient seen this morning sitting at bedside in his wheelchair eating breakfast and says he has no complaint of headache, fever, chills, sweats, abd discomfort, n/v, new numbness, tingling or weakness.. spoke with nursing and  and there were no overnight issues reported.  Sutures to be taken out tomorrow    Objective     Vital signs in last 24 hours:  Temp:  [36.5 °C (97.7 °F)-37.1 °C (98.7 °F)] 37.1 °C (98.7 °F)  Heart Rate:  [52-58] 52  Resp:  [16-20] 20  BP: ()/(56-64) 107/58    No intake or output data in the 24 hours ending 08/27/20 1031  Intake/Output this shift:  No intake/output data recorded.    Review of Systems:  All other systems reviewed and negative except as noted in the HPI.    Labs  No new labs.    Imaging  Not applicable    VTE Assessment: TBD    Full Code    Physical Exam   Constitutional: He is oriented to person, place, and time. Vital signs are normal. He appears well-developed and well-nourished.   HENT:   Head: Normocephalic.       Eyes: Pupils are equal, round, and reactive to light. Conjunctivae and EOM are normal.   Cardiovascular: Normal rate and regular rhythm.   Pulmonary/Chest: Effort normal and breath sounds normal.   Abdominal: Soft. Normal appearance and bowel sounds are normal.   Genitourinary:   Genitourinary Comments: No purvis catheter   Musculoskeletal: Normal range of motion.   No jt erythema, warmth or effusion   Neurological: He is alert and oriented to person, place, and time. He displays no tremor. No cranial nerve deficit or sensory deficit. He exhibits normal muscle tone. He displays no seizure activity. Coordination and gait abnormal.   Fluent, follows commands, strength LLE improving now grossly >3/5 throughout, tone 0/4.  Impaired coordination L side.   Skin: Skin is warm, dry and intact.   Psychiatric: He has a normal mood and affect. His  speech is normal and behavior is normal. Cognition and memory are impaired.      Plan of care was discussed with patient

## 2020-08-27 NOTE — PLAN OF CARE
Problem: Rehabilitation (IRF) Plan of Care  Goal: Plan of Care Review  Outcome: Progressing  Flowsheets (Taken 8/27/2020 6940)  Plan of Care Reviewed With: patient  IRF Plan of Care Review: progress ongoing, continue  Outcome Summary: pt denies pain. steady walking to the bathroom. rings appropriatly.

## 2020-08-27 NOTE — PLAN OF CARE
Problem: Rehabilitation (IRF) Plan of Care  Goal: Plan of Care Review  Flowsheets (Taken 8/27/2020 1022)  Plan of Care Reviewed With: spouse (Humera)  IRF Plan of Care Review: other (see comments)  Outcome Summary: Provided OP Rx to pts wife Humera via scanned email [email or web address] so she can coordinate his OP appointments at [facility] where pt was previously.  Following for d/c and support as needed.

## 2020-08-27 NOTE — PROGRESS NOTES
Patient: Guy Jarrett  Location: [facility] [address]  MRN: [id number]  Today's date: 8/27/2020    History of Present Illness  Guy is a 70 y.o. male admitted on 8/21/2020 with Brain metastases (CMS/HCC).     Pt is a 71 y/o male admitted to [facility] on 8/21/20 s/p craniotomy and mass resection. Pt with known metastatic melanoma diagnosed in August 2019 receiving chemotherapy diagnosed with brain metastasis on 4/22/2020 in the right temporal lobe treated with gamma knife on 5/5/2020 found to have rapidly enlarging right frontal metastases on subsequent surveillance imaging.  He was admitted to [facility] on 8/18/2020.  Dr. Barrera from neurosurgery was consulted and performed craniotomy and mass resection.    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    PT Vitals    Date/Time Pulse BP BP Location BP Method Pt Position [facility]   08/27/20 1455 54 117/69 Left upper arm Automatic Sitting LB      PT Pain    Date/Time Rating: Rest [facility]   08/27/20 1455 0 - no pain LB   08/27/20 1520 0 - no pain LB          Prior Living Environment      Most Recent Value   Lives With  spouse   Living Arrangements  house [2SH]   Living Environment Comment  Pt lives with wife Malka,   Location, Kitchen  first (main) floor   Kitchen Access Comment  wife performs cooking tasks   Location, Laundry Room  second floor, must negotiate stairs to access   Laundry Room Access Comment  wife performs laundry (pt reports he assists at times)   Location, Patient Bedroom  second floor, must negotiate stairs to access   Location, Bathroom  second floor, must negotiate stairs to access   Bathroom Access Comment  walk-in shower s GB, standard height toilet          Prior Level of Function      Most Recent Value   Dominant Hand  right   Ambulation  independent   Transferring   "independent   Toileting  independent   Bathing  independent   Dressing  independent   Eating  independent   Prior Level of Function Comment  Independent PTA. (+) . Enjoys golf and household projects.   Assistive Device/Animal Currently Used at Home  none          IRF PT Evaluation and Treatment - 08/27/20 1450        Time Calculation    Start Time  1450     Stop Time  1525     Time Calculation (min)  35 min        Session Details    Document Type  daily treatment/progress note     Mode of Treatment  physical therapy;individual therapy;concurrent therapy    10' individual, 25' concurrent       Sit to Stand Transfer    [place], Sit to Stand Transfer  distant supervision     Assistive Device  none        Stand to Sit Transfer    [place], Stand to Sit Transfer  distant supervision     Assistive Device  none        Stand Pivot Transfer    [place], Stand Pivot/Stand Step Transfer  distant supervision     Assistive Device  none     Comment  ambulatory approach        Gait Training    [place], Gait  supervision;verbal cues     Assistive Device  none     Distance in Feet  600 feet     Left Sided Gait Deviations  heel strike decreased     Comment  VC for safe speed        Curb Negotiation (Mobility)    [place]  close supervision;verbal cues     Comment  up/down 6+2\" curb x2 reps to simulate entry into home; VC for sequencing        Aerobic Exercise    Type (Aerobic Exercise)  recumbent elliptical      Time Performed (Aerobic Exercise)  16     Comment (Aerobic Exercise)  NuStep BLE only @ level 6        Daily Progress Summary (PT)    Daily Outcome Statement (PT)  Progressed to distant S for transfers. Continuing improvements in strength, balance, and safety awareness.              IRF PT Goals      Most Recent Value   Bed Mobility Goal 1   Activity/Assistive Device  sit to supine/supine to sit, rolling to left, rolling to right at 08/26/2020 0836   [place]  modified independence at " 08/26/2020 0836   Time Frame  short-term goal (STG), 1 week at 08/26/2020 0836   Progress/Outcome  goal met, goal revised this date at 08/26/2020 0836   Bed Mobility Goal 2   Activity/Assistive Device  sit to supine/supine to sit, rolling to left, rolling to right at 08/22/2020 1100   [place]  modified independence at 08/22/2020 1100   Time Frame  long-term goal (LTG), 21 days or less at 08/22/2020 1100   Progress/Outcome  goal ongoing at 08/26/2020 0836   Transfer Goal 1   Activity/Assistive Device  sit-to-stand/stand-to-sit, bed-to-chair/chair-to-bed, stand pivot, no assistive device at 08/26/2020 0836   [place]  supervision required, verbal cues required at 08/26/2020 0836   Time Frame  short-term goal (STG), 1 week at 08/26/2020 0836   Progress/Outcome  goal met, goal revised this date at 08/26/2020 0836   Transfer Goal 2   Activity/Assistive Device  sit-to-stand/stand-to-sit, bed-to-chair/chair-to-bed, car transfer, stand pivot at 08/22/2020 1100   [place]  modified independence at 08/22/2020 1100   Time Frame  long-term goal (LTG), 21 days or less at 08/22/2020 1100   Progress/Outcome  goal ongoing at 08/26/2020 0836   Gait/Walking Locomotion Goal 1   Activity/Assistive Device  gait (walking locomotion), no assistive device at 08/26/2020 0836   Distance  200 feet at 08/26/2020 0836   [place]  supervision required, verbal cues required at 08/26/2020 0836   Time Frame  short-term goal (STG), 1 week at 08/26/2020 0836   Progress/Outcome  goal met, goal revised this date at 08/26/2020 0836   Gait/Walking Locomotion Goal 2   Activity/Assistive Device  gait (walking locomotion) at 08/22/2020 1100   Distance  200 feet at 08/22/2020 1100   [place]  modified independence at 08/22/2020 1100   Time Frame  long-term goal (LTG), 21 days or less at 08/22/2020 1100   Progress/Outcome  goal ongoing at 08/26/2020 0836   Stairs Goal 1   Activity/Assistive Device  ascending stairs, descending stairs,  using handrail, right, step-to-step at 08/26/2020 0836   Number of Stairs  12 at 08/26/2020 0836   [place]  supervision required, verbal cues required at 08/26/2020 0836   Time Frame  short-term goal (STG), 1 week at 08/26/2020 0836   Progress/Outcome  goal met, goal revised this date at 08/26/2020 0836   Stairs Goal 2   Activity/Assistive Device  ascending stairs, descending stairs, using handrail, right, step-to-step at 08/22/2020 1100   Number of Stairs  12 at 08/22/2020 1100   [place]  modified independence at 08/22/2020 1100   Time Frame  long-term goal (LTG), 21 days or less at 08/22/2020 1100   Progress/Outcome  goal ongoing at 08/26/2020 0836

## 2020-08-27 NOTE — PROGRESS NOTES
Patient: Guy Jarrett  Location: [facility] [address]  MRN: [id number]  Today's date: 8/27/2020    History of Present Illness  Guy is a 70 y.o. male admitted on 8/21/2020 with Brain metastases (CMS/HCC).     Pt is a 71 y/o male admitted to [facility] on 8/21/20 s/p craniotomy and mass resection. Pt with known metastatic melanoma diagnosed in August 2019 receiving chemotherapy diagnosed with brain metastasis on 4/22/2020 in the right temporal lobe treated with gamma knife on 5/5/2020 found to have rapidly enlarging right frontal metastases on subsequent surveillance imaging.  He was admitted to [facility] on 8/18/2020.  Dr. Barrera from neurosurgery was consulted and performed craniotomy and mass resection.    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    OT Vitals    Date/Time Pulse HR Source BP BP Location BP Method Pt Position [facility]   08/27/20 0822 58 Monitor 107/60 Left upper arm Automatic Sitting DT      OT Pain    Date/Time Pain Type Pref Pain Scale Rating: Rest Rating: Activity [facility]   08/27/20 0830 Post Activity word (verbal rating pain scale) 0 - no pain 0 - no pain DT          Prior Living Environment      Most Recent Value   Lives With  spouse   Living Arrangements  house [2SH]   Living Environment Comment  Pt lives with wife Malka,   Location, Kitchen  first (main) floor   Kitchen Access Comment  wife performs cooking tasks   Location, Laundry Room  second floor, must negotiate stairs to access   Laundry Room Access Comment  wife performs laundry (pt reports he assists at times)   Location, Patient Bedroom  second floor, must negotiate stairs to access   Location, Bathroom  second floor, must negotiate stairs to access   Bathroom Access Comment  walk-in shower s GB, standard height toilet          Prior Level of Function       Most Recent Value   Dominant Hand  right   Ambulation  independent   Transferring  independent   Toileting  independent   Bathing  independent   Dressing  independent   Eating  independent   Prior Level of Function Comment  Independent PTA. (+) . Enjoys golf and household projects.   Assistive Device/Animal Currently Used at Home  none          IRF OT Evaluation and Treatment - 08/27/20 0811        Time Calculation    Start Time  0800     Stop Time  0830     Time Calculation (min)  30 min        Session Details    Document Type  daily treatment/progress note     Mode of Treatment  individual therapy;occupational therapy        Bed Mobility    [place], Supine to Sit  distant supervision     Assistive Device (Bed Mobility)  head of bed elevated        Sit to Stand Transfer    [place], Sit to Stand Transfer  supervision     Verbal Cues  safety     Assistive Device  none        Stand to Sit Transfer    [place], Stand to Sit Transfer  supervision     Verbal Cues  safety     Assistive Device  none        Toilet Transfer    Transfer Technique  stand pivot     [place], Toilet Transfer  supervision     Assistive Device  raised toilet seat;grab bars/safety frame        Safety Issues, Functional Mobility    Comment, Safety Issues/Impairments (Mobility)  CLS ambulating s AD on unit        Balance    Comment, Balance  Pt ambulating tandem stance requiring MIN-MOD A to maintain balance. Pt then ambulating backwards with CLS-MIN A to maintai nbalance.         Lower Body Dressing    Self-Performance  don;dons/doffs left sock;dons/doffs right sock;dons/doffs left shoe;dons/doffs right shoe     Position  unsupported sitting;edge of bed sitting     Adaptive Equipment  none     [place], Footwear  distant supervision        Grooming    Self-Performance  washes, rinses and dries hands     [place]  distant supervision     Position  unsupported standing;sink side        Toileting    [place]  distant  supervision     Position  unsupported sitting;unsupported standing     Adaptive Equipment  grab bar/safety frame;raised toilet seat        Pre-Driving Assessment    Medical Issues  edu on driving letter/driving letter process. edu do not return to driving at this time. pt expressed understanding        Daily Progress Summary (OT)    Daily Outcome Statement (OT)  IMproved in toileting, LB dressing, and toilet transfer.                       Education provided this session. See the Patient Education summary report for full details.    IRF OT Goals      Most Recent Value   Transfer Goal 1   Activity/Assistive Device  toilet at 08/25/2020 1434   [place]  supervision required at 08/25/2020 1434   Time Frame  short-term goal (STG), 1 week at 08/25/2020 1434   Progress/Outcome  goal revised this date at 08/25/2020 1434   Transfer Goal 2   Activity/Assistive Device  toilet at 08/22/2020 0704   [place]  modified independence at 08/22/2020 0704   Time Frame  long-term goal (LTG), 4 weeks at 08/22/2020 0704   Progress/Outcome  goal ongoing at 08/25/2020 1434   Transfer Goal 3   Activity/Assistive Device  shower at 08/25/2020 1434   [place]  supervision required at 08/25/2020 1434   Time Frame  short-term goal (STG), 1 week at 08/25/2020 1434   Progress/Outcome  goal revised this date at 08/25/2020 1434   Transfer Goal 4   Activity/Assistive Device  shower at 08/22/2020 0704   [place]  modified independence at 08/22/2020 0704   Time Frame  long-term goal (LTG), 4 weeks at 08/22/2020 0704   Progress/Outcome  goal ongoing at 08/25/2020 1434   Bathing Goal 1   Activity/Assistive Device  bathing skills, all at 08/25/2020 1434   [place]  supervision required at 08/25/2020 1434   Time Frame  short-term goal (STG), 1 week at 08/25/2020 1434   Progress/Outcome  goal revised this date at 08/25/2020 1434   Bathing Goal 2   Activity/Assistive Device  bathing skills, all at 08/22/2020 0704   [place]  modified  independence at 08/22/2020 0704   Time Frame  long-term goal (LTG), 4 weeks at 08/22/2020 0704   Progress/Outcome  goal ongoing at 08/25/2020 1434   UB Dressing Goal 1   Activity/Assistive Device  upper body dressing at 08/25/2020 1434   [place]  supervision required at 08/25/2020 1434   Time Frame  short-term goal (STG), 1 week at 08/25/2020 1434   Strategies/Barriers  including item retrieval at 08/25/2020 1434   Progress/Outcome  goal revised this date at 08/25/2020 1434   UB Dressing Goal 2   Activity/Assistive Device  upper body dressing at 08/22/2020 0704   [place]  modified independence at 08/22/2020 0704   Time Frame  long-term goal (LTG), 4 weeks at 08/22/2020 0704   Progress/Outcome  goal ongoing at 08/25/2020 1434   LB Dressing Goal 1   Activity/Assistive Device  lower body dressing at 08/25/2020 1434   [place]  supervision required at 08/25/2020 1434   Time Frame  short-term goal (STG), 1 week at 08/25/2020 1434   Strategies/Barriers  including item retrieval at 08/25/2020 1434   Progress/Outcome  goal revised this date at 08/25/2020 1434   LB Dressing Goal 2   Activity/Assistive Device  lower body dressing at 08/22/2020 0704   [place]  modified independence at 08/22/2020 0704   Time Frame  long-term goal (LTG), 4 weeks at 08/22/2020 0704   Progress/Outcome  goal ongoing at 08/25/2020 1434   Grooming Goal 1   Activity/Assistive Device  grooming skills, all at 08/25/2020 1434   [place]  supervision required at 08/25/2020 1434   Time Frame  short-term goal (STG), 1 week at 08/25/2020 1434   Progress/Outcome  goal revised this date at 08/25/2020 1434   Grooming Goal 2   Activity/Assistive Device  grooming skills, all at 08/22/2020 0704   [place]  modified independence at 08/22/2020 0704   Time Frame  long-term goal (LTG), 4 weeks at 08/22/2020 0704   Progress/Outcome  goal ongoing at 08/25/2020 1434   Toileting Goal 1   Activity/Assistive Device  toileting skills, all at  08/25/2020 1434   [place]  supervision required at 08/25/2020 1434   Time Frame  short-term goal (STG), 1 week at 08/25/2020 1434   Progress/Outcome  goal revised this date at 08/25/2020 1434   Toileting Goal 2   Activity/Assistive Device  toileting skills, all at 08/22/2020 0704   [place]  modified independence at 08/22/2020 0704   Time Frame  long-term goal (LTG), 4 weeks at 08/22/2020 0704   Progress/Outcome  goal ongoing at 08/25/2020 1434

## 2020-08-27 NOTE — ASSESSMENT & PLAN NOTE
PCP after discharge  Neurosurgeon Dr. Beau Barrera at [facility] in 4 to 6 weeks [phone].    Oncology NP Chyna Pugh 9/15/2020 10am [phone] for next round of chemo.

## 2020-08-27 NOTE — PROGRESS NOTES
Patient: Guy Jarrett  Location: [facility] [address]  MRN: [id number]  Today's date: 8/27/2020    History of Present Illness  Guy is a 70 y.o. male admitted on 8/21/2020 with Brain metastases (CMS/HCC).     Pt is a 69 y/o male admitted to [facility] on 8/21/20 s/p craniotomy and mass resection. Pt with known metastatic melanoma diagnosed in August 2019 receiving chemotherapy diagnosed with brain metastasis on 4/22/2020 in the right temporal lobe treated with gamma knife on 5/5/2020 found to have rapidly enlarging right frontal metastases on subsequent surveillance imaging.  He was admitted to \[facility]\"" on 8/18/2020.  Dr. Barrera from neurosurgery was consulted and performed craniotomy and mass resection.    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    PT Vitals    Date/Time Pulse BP BP Location BP Method Pt Position [facility]   08/27/20 0902 52 107/58 Left upper arm Automatic Sitting LB      PT Pain    Date/Time Pain Type Pref Pain Scale Rating: Rest Rating: Activity Sleep/Rest/Relaxation [facility]   08/27/20 0902 -- -- 0 - no pain -- -- LB   08/27/20 0955 -- -- 0 - no pain -- -- LB          Prior Living Environment      Most Recent Value   Lives With  spouse   Living Arrangements  house [2SH]   Living Environment Comment  Pt lives with wife Malka,   Location, Kitchen  first (main) floor   Kitchen Access Comment  wife performs cooking tasks   Location, Laundry Room  second floor, must negotiate stairs to access   Laundry Room Access Comment  wife performs laundry (pt reports he assists at times)   Location, Patient Bedroom  second floor, must negotiate stairs to access   Location, Bathroom  second floor, must negotiate stairs to access   Bathroom Access Comment  walk-in shower s GB, standard height toilet          Prior Level of Function       "Most Recent Value   Dominant Hand  right   Ambulation  independent   Transferring  independent   Toileting  independent   Bathing  independent   Dressing  independent   Eating  independent   Prior Level of Function Comment  Independent PTA. (+) . Enjoys golf and household projects.   Assistive Device/Animal Currently Used at Home  none          IRF PT Evaluation and Treatment - 08/27/20 0912        Time Calculation    Start Time  0900     Stop Time  1000     Time Calculation (min)  60 min        Session Details    Document Type  daily treatment/progress note     Mode of Treatment  physical therapy;individual therapy        Sit to Stand Transfer    [place], Sit to Stand Transfer  supervision;verbal cues     Verbal Cues  safety     Assistive Device  none        Stand to Sit Transfer    [place], Stand to Sit Transfer  supervision;verbal cues     Verbal Cues  safety     Assistive Device  none        Stand Pivot Transfer    [place], Stand Pivot/Stand Step Transfer  supervision;verbal cues     Verbal Cues  safety     Assistive Device  none     Comment  ambulatory approach        Gait Training    [place], Gait  supervision;verbal cues     Assistive Device  none     Distance in Feet  600 feet    600', 300' x2    Left Sided Gait Deviations  heel strike decreased     Advanced Gait Activity  rough/uneven surfaces     Comment  over various indoor surfaces (tile, carpet, thresholds) with intermittent VC for safety        Curb Negotiation (Mobility)    [place]  close supervision;verbal cues     Comment  up/down 6+2\" curb without AD, VC for safety        Rough/Uneven Surface Gait Skills (Mobility)    [place]  close supervision;verbal cues     Comment  Outdoor ambulation >1,000 feet over various surfaces (sidewalks, inclines) with VC for safety, increased L foot slap with fatigue        Sloped Surface Gait Skills (Mobility)    [place]  close supervision;verbal cues     Comment  up/down 5' " "ramp without AD, VC for safety        Stairs Training    [place], Stairs  supervision;verbal cues     Assistive Device  railing     Handrail Location  right side (ascending);left side (descending)     Number of Stairs  16     Stair Height  6 inches     Ascending Stairs Technique  step-over-step     Descending Stairs Technique  step-to-step     Comment  VC for safety/sequencing        Lower Extremity (Therapeutic Exercise)    Exercise Position/Type (LE Therapeutic Exercise)  supine;standing;side lying     Comment (LE Therapeutic Exercise)  STANDING: Squats on foam 20x2 Cl S. Step-taps on 8\" block (contralateral LE moving foam to traffic cone) x15 reps LLE (Min/Mod A) x10 reps RLE (Mod A, limited by impaired LLE coordination)   SUPINE/SIDELYING: Bridges 10x3, L hip flexion SLR 10x3, sidelying L hip ER 10x3        Daily Progress Summary (PT)    Daily Outcome Statement (PT)  Continues to demonstrate improving LLE strength and resulting increased independence with mobility. Will continue to benefit from LLE coordination activities.                 IRF PT Goals      Most Recent Value   Bed Mobility Goal 1   Activity/Assistive Device  sit to supine/supine to sit, rolling to left, rolling to right at 08/26/2020 0836   [place]  modified independence at 08/26/2020 0836   Time Frame  short-term goal (STG), 1 week at 08/26/2020 0836   Progress/Outcome  goal met, goal revised this date at 08/26/2020 0836   Bed Mobility Goal 2   Activity/Assistive Device  sit to supine/supine to sit, rolling to left, rolling to right at 08/22/2020 1100   [place]  modified independence at 08/22/2020 1100   Time Frame  long-term goal (LTG), 21 days or less at 08/22/2020 1100   Progress/Outcome  goal ongoing at 08/26/2020 0836   Transfer Goal 1   Activity/Assistive Device  sit-to-stand/stand-to-sit, bed-to-chair/chair-to-bed, stand pivot, no assistive device at 08/26/2020 0836   [place]  supervision required, verbal cues required " at 08/26/2020 0836   Time Frame  short-term goal (STG), 1 week at 08/26/2020 0836   Progress/Outcome  goal met, goal revised this date at 08/26/2020 0836   Transfer Goal 2   Activity/Assistive Device  sit-to-stand/stand-to-sit, bed-to-chair/chair-to-bed, car transfer, stand pivot at 08/22/2020 1100   [place]  modified independence at 08/22/2020 1100   Time Frame  long-term goal (LTG), 21 days or less at 08/22/2020 1100   Progress/Outcome  goal ongoing at 08/26/2020 0836   Gait/Walking Locomotion Goal 1   Activity/Assistive Device  gait (walking locomotion), no assistive device at 08/26/2020 0836   Distance  200 feet at 08/26/2020 0836   [place]  supervision required, verbal cues required at 08/26/2020 0836   Time Frame  short-term goal (STG), 1 week at 08/26/2020 0836   Progress/Outcome  goal met, goal revised this date at 08/26/2020 0836   Gait/Walking Locomotion Goal 2   Activity/Assistive Device  gait (walking locomotion) at 08/22/2020 1100   Distance  200 feet at 08/22/2020 1100   [place]  modified independence at 08/22/2020 1100   Time Frame  long-term goal (LTG), 21 days or less at 08/22/2020 1100   Progress/Outcome  goal ongoing at 08/26/2020 0836   Stairs Goal 1   Activity/Assistive Device  ascending stairs, descending stairs, using handrail, right, step-to-step at 08/26/2020 0836   Number of Stairs  12 at 08/26/2020 0836   [place]  supervision required, verbal cues required at 08/26/2020 0836   Time Frame  short-term goal (STG), 1 week at 08/26/2020 0836   Progress/Outcome  goal met, goal revised this date at 08/26/2020 0836   Stairs Goal 2   Activity/Assistive Device  ascending stairs, descending stairs, using handrail, right, step-to-step at 08/22/2020 1100   Number of Stairs  12 at 08/22/2020 1100   [place]  modified independence at 08/22/2020 1100   Time Frame  long-term goal (LTG), 21 days or less at 08/22/2020 1100   Progress/Outcome  goal ongoing at 08/26/2020 0836

## 2020-08-27 NOTE — ASSESSMENT & PLAN NOTE
PCP after discharge  Neurosurgeon Dr. Beau Barrera at [facility] in 4 to 6 weeks [phone].    Oncology NP Chyna Pugh 9/15/2020 10am [phone] for next round of chemo.

## 2020-08-28 ENCOUNTER — APPOINTMENT (INPATIENT)
Dept: OCCUPATIONAL THERAPY | Facility: REHABILITATION | Age: 70
DRG: 949 | End: 2020-08-28
Payer: COMMERCIAL

## 2020-08-28 ENCOUNTER — APPOINTMENT (INPATIENT)
Dept: PHYSICAL THERAPY | Facility: REHABILITATION | Age: 70
DRG: 949 | End: 2020-08-28
Payer: COMMERCIAL

## 2020-08-28 LAB
ATRIAL RATE: 55
GLUCOSE BLD-MCNC: 101 MG/DL (ref 70–99)
GLUCOSE BLD-MCNC: 122 MG/DL (ref 70–99)
P AXIS: 46
POCT TEST: ABNORMAL
POCT TEST: ABNORMAL
PR INTERVAL: 138
QRS DURATION: 96
QT INTERVAL: 430
QTC CALCULATION(BAZETT): 411
R AXIS: -30
T WAVE AXIS: 28
VENTRICULAR RATE: 55

## 2020-08-28 PROCEDURE — 97535 SELF CARE MNGMENT TRAINING: CPT | Mod: GO

## 2020-08-28 PROCEDURE — 97530 THERAPEUTIC ACTIVITIES: CPT | Mod: GP

## 2020-08-28 PROCEDURE — 63700000 HC SELF-ADMINISTRABLE DRUG: Performed by: PHYSICAL MEDICINE & REHABILITATION

## 2020-08-28 PROCEDURE — 97530 THERAPEUTIC ACTIVITIES: CPT | Mod: GO

## 2020-08-28 PROCEDURE — 97129 THER IVNTJ 1ST 15 MIN: CPT | Mod: GO

## 2020-08-28 PROCEDURE — 97116 GAIT TRAINING THERAPY: CPT | Mod: GP

## 2020-08-28 PROCEDURE — 97110 THERAPEUTIC EXERCISES: CPT | Mod: GP

## 2020-08-28 PROCEDURE — 97150 GROUP THERAPEUTIC PROCEDURES: CPT | Mod: GO

## 2020-08-28 PROCEDURE — 63700000 HC SELF-ADMINISTRABLE DRUG: Performed by: HOSPITALIST

## 2020-08-28 PROCEDURE — 12800001 HC ROOM AND CARE SEMIPRIVATE REHAB-BRAIN INJ

## 2020-08-28 PROCEDURE — 63600000 HC DRUGS/DETAIL CODE: Performed by: STUDENT IN AN ORGANIZED HEALTH CARE EDUCATION/TRAINING PROGRAM

## 2020-08-28 PROCEDURE — 63600000 HC DRUGS/DETAIL CODE: Performed by: PHYSICAL MEDICINE & REHABILITATION

## 2020-08-28 RX ORDER — LEVETIRACETAM 500 MG/1
500 TABLET ORAL 2 TIMES DAILY
Status: DISCONTINUED | OUTPATIENT
Start: 2020-08-28 | End: 2020-08-29 | Stop reason: HOSPADM

## 2020-08-28 RX ADMIN — HEPARIN SODIUM 5000 UNITS: 5000 INJECTION, SOLUTION INTRAVENOUS; SUBCUTANEOUS at 05:16

## 2020-08-28 RX ADMIN — ATORVASTATIN CALCIUM 10 MG: 10 TABLET, FILM COATED ORAL at 07:52

## 2020-08-28 RX ADMIN — DEXAMETHASONE 4 MG: 4 TABLET ORAL at 20:48

## 2020-08-28 RX ADMIN — LEVETIRACETAM 500 MG: 500 TABLET ORAL at 20:47

## 2020-08-28 RX ADMIN — LEVETIRACETAM 1000 MG: 500 TABLET ORAL at 07:52

## 2020-08-28 RX ADMIN — DEXAMETHASONE 4 MG: 4 TABLET ORAL at 08:39

## 2020-08-28 RX ADMIN — LISINOPRIL 10 MG: 10 TABLET ORAL at 07:52

## 2020-08-28 RX ADMIN — PANTOPRAZOLE SODIUM 40 MG: 40 TABLET, DELAYED RELEASE ORAL at 07:52

## 2020-08-28 ASSESSMENT — BALANCE ASSESSMENTS: TOTAL SCORE: 50

## 2020-08-28 NOTE — PROGRESS NOTES
Patient: Guy Jarrett  Location: [facility] [address]  MRN: [id number]  Today's date: 8/28/2020    History of Present Illness  Guy is a 70 y.o. male admitted on 8/21/2020 with Brain metastases (CMS/HCC).     Pt is a 71 y/o male admitted to [facility] on 8/21/20 s/p craniotomy and mass resection. Pt with known metastatic melanoma diagnosed in August 2019 receiving chemotherapy diagnosed with brain metastasis on 4/22/2020 in the right temporal lobe treated with gamma knife on 5/5/2020 found to have rapidly enlarging right frontal metastases on subsequent surveillance imaging.  He was admitted to [facility] on 8/18/2020.  Dr. Barrera from neurosurgery was consulted and performed craniotomy and mass resection.    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    OT Vitals    Date/Time Pulse HR Source Resp SpO2 Pt Activity BP BP Location BP Method Pt Position [facility]   08/28/20 0705 -- -- 18 96 % At rest -- -- -- -- SF   08/28/20 0705 52 Monitor -- -- -- 120/65 Left upper arm Automatic Lying DT      OT Pain    Date/Time Pain Type Pref Pain Scale Rating: Rest Rating: Activity [facility]   08/28/20 0705 Pain Assessment word (verbal rating pain scale) 0 - no pain 0 - no pain DT   08/28/20 0829 Post Activity word (verbal rating pain scale) 0 - no pain 0 - no pain DT          Prior Living Environment      Most Recent Value   Lives With  spouse   Living Arrangements  house [2SH]   Living Environment Comment  Pt lives with wife Malka,   Location, Kitchen  first (main) floor   Kitchen Access Comment  wife performs cooking tasks   Location, Laundry Room  second floor, must negotiate stairs to access   Laundry Room Access Comment  wife performs laundry (pt reports he assists at times)   Location, Patient Bedroom  second floor, must negotiate stairs to access    Location, Bathroom  second floor, must negotiate stairs to access   Bathroom Access Comment  walk-in shower s GB, standard height toilet          Prior Level of Function      Most Recent Value   Dominant Hand  right   Ambulation  independent   Transferring  independent   Toileting  independent   Bathing  independent   Dressing  independent   Eating  independent   Prior Level of Function Comment  Independent PTA. (+) . Enjoys golf and household projects.   Assistive Device/Animal Currently Used at Home  none          IRF OT Evaluation and Treatment - 08/28/20 0712        Time Calculation    Start Time  0700     Stop Time  0830     Time Calculation (min)  90 min        Session Details    Document Type  discharge evaluation     Mode of Treatment  individual therapy;occupational therapy        Sensory Assessment (Somatosensory)    Sensory Assessment (Somatosensory)  UE sensation intact        Hand  Strength Testing    Left Hand: Tip (Pincer) Pinch Strength  6.5     Left Hand: Lateral (Key) Pinch Strength  16     Left Hand: Three Point ([place]aron) Pinch Strength  12     Right Hand: Tip (Pincer) Pinch Strength  1     Right Hand: Lateral (Key) Pinch Strength  2     Right Hand: Three Point (Daron) Pinch Strength  3        Strength Comprehensive (MMT)    Comment  4+/5 BUE shoulder flex/elbow flex        Bed to Chair Transfer    [place], Bed to Chair  modified independence     Assistive Device  other (see comments)    none    Comment  ambulatory approach        Sit to Stand Transfer    [place], Sit to Stand Transfer  modified independence     Assistive Device  none        Stand to Sit Transfer    [place], Stand to Sit Transfer  modified independence     Assistive Device  none        Stand Pivot Transfer    [place], Stand Pivot/Stand Step Transfer  modified independence     Assistive Device  none     Comment  ambulatory approach        Toilet Transfer    Transfer Technique  stand pivot      [place], Toilet Transfer  modified independence     Assistive Device  raised toilet seat;grab bars/safety frame     Comment  ambulatory approach        Shower Transfer    Transfer Technique  stand pivot     [place], Shower Transfer  modified independence     Assistive Device  shower chair     Comment  ambulatory approach, simulating walk-in shower (stepping over threshold        Safety Issues, Functional Mobility    Comment, Safety Issues/Impairments (Mobility)  [place] ambulating s AD        Balance    Comment, Balance  MOD I ambulating s AD        Motor Skills    Results, 9 Hole Peg Test of Fine Motor Coordination  RUE: 29 seconds LUE: 22 Box and Blocks RUE: 59 LUE: 61        Bathing    Self-Performance  chest;left arm;right arm;abdomen;front perineal area;left upper leg;buttocks;left lower leg, including foot;right upper leg;right lower leg, including foot     [place]  modified independence     Position  unsupported sitting;unsupported standing     Adaptive Equipment  shower chair;grab bar/tub rail;hand-held shower spray hose        Upper Body Dressing    Self-Performance  don;doff;obtains clothes;threads left arm, shirt;threads right arm, shirt;pulls shirt over head/around back;pulls shirt down/adjusts     [place]  modified independence     Position  unsupported sitting     Adaptive Equipment  none        Lower Body Dressing    Self-Performance  doff;don;obtains clothes;threads left leg, underpants;threads right leg, underpants;pulls underpants up or down;threads left leg, pants/shorts;threads right leg, pants/shorts;pulls pants/shorts up or down;dons/doffs left sock;dons/doffs right sock;dons/doffs left shoe;dons/doffs right shoe;shoelaces, left;shoelaces, right     [place]  modified independence     Position  unsupported sitting;unsupported standing     Adaptive Equipment  none     [place], Footwear  modified independence        Grooming    Self-Performance  washes, rinses and dries  face;washes, rinses and dries hands;oral care (brushing teeth, cleaning dentures);brushes/clark hair     [place]  modified independence     Position  unsupported standing;sink side     [place], Oral Hygiene  modified independence        Toileting    [place]  modified independence     Position  unsupported sitting;unsupported standing     Adaptive Equipment  raised toilet seat;grab bar/safety frame        Pre-Driving Assessment    Cognition Issues  Reassessment of MoCA 7.2 completed. Pt scored 25/30 (WFL is greater or equal to 26/30). Pt with deficits in language (2/3) and delayed recall (1/5).         Discharge Summary (OT)    Outcomes Achieved Upon Discharge (OT)  all goals met within established timeframes     Discharge Summary Statement (OT)  Pt being discharged home at MOD I level with wife. Wife is able to provide assist for IADL activities.Continues to have limitations on left side (UE/LE) including weakness and coordination. Greater limitation in coordination of right hand due to issues in cervical issues prior to admission and was receiving therapy but stopped due to COVID outbreak. Recommending follow up with ophthalmogist (efe noted). DME recommendations include a shower chely, grab bars for shower, and toilet safety frame. It is not recommended pt return to driving at this time, pt made aware and driving letter sent.      Transfer to Another Level of Care or Facility (OT)  patient to receive therapy via outpatient clinic                       Education provided this session. See the Patient Education summary report for full details.    IRF OT Goals      Most Recent Value   Transfer Goal 1   Activity/Assistive Device  toilet at 08/25/2020 1434   [place]  supervision required at 08/25/2020 1434   Time Frame  short-term goal (STG), 1 week at 08/25/2020 1434   Progress/Outcome  goal met at 08/28/2020 0712   Transfer Goal 2   Activity/Assistive Device  toilet at 08/22/2020 0704    [place]  modified independence at 08/22/2020 0704   Time Frame  long-term goal (LTG), 4 weeks at 08/22/2020 0704   Progress/Outcome  goal met at 08/28/2020 0712   Transfer Goal 3   Activity/Assistive Device  shower at 08/25/2020 1434   [place]  supervision required at 08/25/2020 1434   Time Frame  short-term goal (STG), 1 week at 08/25/2020 1434   Progress/Outcome  goal met at 08/28/2020 0712   Transfer Goal 4   Activity/Assistive Device  shower at 08/22/2020 0704   [place]  modified independence at 08/22/2020 0704   Time Frame  long-term goal (LTG), 4 weeks at 08/22/2020 0704   Progress/Outcome  goal met at 08/28/2020 0712   Bathing Goal 1   Activity/Assistive Device  bathing skills, all at 08/25/2020 1434   [place]  supervision required at 08/25/2020 1434   Time Frame  short-term goal (STG), 1 week at 08/25/2020 1434   Progress/Outcome  goal met at 08/28/2020 0712   Bathing Goal 2   Activity/Assistive Device  bathing skills, all at 08/22/2020 0704   [place]  modified independence at 08/22/2020 0704   Time Frame  long-term goal (LTG), 4 weeks at 08/22/2020 0704   Progress/Outcome  goal met at 08/28/2020 0712   UB Dressing Goal 1   Activity/Assistive Device  upper body dressing at 08/25/2020 1434   [place]  supervision required at 08/25/2020 1434   Time Frame  short-term goal (STG), 1 week at 08/25/2020 1434   Strategies/Barriers  including item retrieval at 08/25/2020 1434   Progress/Outcome  goal met at 08/28/2020 0712   UB Dressing Goal 2   Activity/Assistive Device  upper body dressing at 08/22/2020 0704   [place]  modified independence at 08/22/2020 0704   Time Frame  long-term goal (LTG), 4 weeks at 08/22/2020 0704   Progress/Outcome  goal met at 08/28/2020 0712   LB Dressing Goal 1   Activity/Assistive Device  lower body dressing at 08/25/2020 1434   [place]  supervision required at 08/25/2020 1434   Time Frame  short-term goal (STG), 1 week at 08/25/2020 1434    Strategies/Barriers  including item retrieval at 08/25/2020 1434   Progress/Outcome  goal met at 08/28/2020 0712   LB Dressing Goal 2   Activity/Assistive Device  lower body dressing at 08/22/2020 0704   [place]  modified independence at 08/22/2020 0704   Time Frame  long-term goal (LTG), 4 weeks at 08/22/2020 0704   Progress/Outcome  goal met at 08/28/2020 0712   Grooming Goal 1   Activity/Assistive Device  grooming skills, all at 08/25/2020 1434   [place]  supervision required at 08/25/2020 1434   Time Frame  short-term goal (STG), 1 week at 08/25/2020 1434   Progress/Outcome  goal met at 08/28/2020 0712   Grooming Goal 2   Activity/Assistive Device  grooming skills, all at 08/22/2020 0704   [place]  modified independence at 08/22/2020 0704   Time Frame  long-term goal (LTG), 4 weeks at 08/22/2020 0704   Progress/Outcome  goal met at 08/28/2020 0712   Toileting Goal 1   Activity/Assistive Device  toileting skills, all at 08/25/2020 1434   [place]  supervision required at 08/25/2020 1434   Time Frame  short-term goal (STG), 1 week at 08/25/2020 1434   Progress/Outcome  goal met at 08/28/2020 0712   Toileting Goal 2   Activity/Assistive Device  toileting skills, all at 08/22/2020 0704   [place]  modified independence at 08/22/2020 0704   Time Frame  long-term goal (LTG), 4 weeks at 08/22/2020 0704   Progress/Outcome  goal met at 08/28/2020 0712

## 2020-08-28 NOTE — PLAN OF CARE
Problem: Rehabilitation (IRF) Plan of Care  Goal: Plan of Care Review  Flowsheets (Taken 8/28/2020 9738)  Plan of Care Reviewed With: patient  IRF Plan of Care Review: progress ongoing, continue  Outcome Summary: Pt participated in falls prevention class

## 2020-08-28 NOTE — DISCHARGE SUMMARY
Rehab Discharge Summary  BRIEF OVERVIEW        Admitting Provider: Brando Love DO  Discharge Provider: Brando Love DO  Primary Care Physician at Discharge: Jake Cho [phone]     Admission Date: 8/21/2020     Discharge Date: 8/29/2020      Primary Discharge Diagnosis  Metastatic Melanoma with brain mets     Secondary Discharge Diagnosis  Seizures, HTN, HLD,      Discharge Disposition  Final discharge disposition not confirmed  Code Status at Discharge: Full Code    Discharge Medications     Medication List      CHANGE how you take these medications    acetaminophen 325 mg tablet  Commonly known as:  TYLENOL  Take 2 tablets (650 mg total) by mouth every 4 (four) hours as needed for mild pain or moderate pain.  Dose:  650 mg  What changed:    · medication strength  · when to take this  · reasons to take this     * dexAMETHasone 4 mg tablet  Commonly known as:  DECADRON  Take 1 tablet (4 mg total) by mouth every 12 (twelve) hours for 6 doses.  Dose:  4 mg  What changed:  when to take this     * dexAMETHasone 2 mg tablet  Commonly known as:  DECADRON  Start taking on:  August 30, 2020  Take 1 tablet (2 mg total) by mouth every 12 (twelve) hours for 6 doses.  Dose:  2 mg  What changed:  You were already taking a medication with the same name, and this prescription was added. Make sure you understand how and when to take each.     * dexAMETHasone 2 mg tablet  Commonly known as:  DECADRON  Start taking on:  September 3, 2020  Take 1 tablet (2 mg total) by mouth daily for 2 doses.  Dose:  2 mg  What changed:  You were already taking a medication with the same name, and this prescription was added. Make sure you understand how and when to take each.         * This list has 3 medication(s) that are the same as other medications prescribed for you. Read the directions carefully, and ask your doctor or other care provider to review them with you.            CONTINUE taking these medications     atorvastatin 10 mg tablet  Commonly known as:  LIPITOR  Take 1 tablet (10 mg total) by mouth daily.  Dose:  10 mg     levETIRAcetam 1,000 mg tablet  Commonly known as:  KEPPRA  Take 1 tablet (1,000 mg total) by mouth 2 (two) times a day.  Dose:  1,000 mg     lisinopriL 10 mg tablet  Commonly known as:  PRINIVIL  Take 1 tablet (10 mg total) by mouth daily.  Dose:  10 mg     ondansetron ODT 4 mg disintegrating tablet  Commonly known as:  ZOFRAN-ODT  Take 1 tablet (4 mg total) by mouth every 8 (eight) hours as needed for nausea or vomiting.  Dose:  4 mg        STOP taking these medications    acetaminophen-codeine 300-30 mg per tablet  Commonly known as:  TYLENOL #3     heparin (porcine) 5,000 unit/mL syringe     insulin aspart (niacinamide) 100 unit/mL injection  Commonly known as:  FIASP            Active Issues Requiring Follow-up    Follow-up Appointments Arranged: No     Outpatient Follow-Up  PCP after discharge  Neurosurgeon Dr. Beau Barrera at [facility] in 4 to 6 weeks [phone].    Oncology NP Chyna Pugh 9/15/2020 10am [phone] for next round of chemo.    Test Results Pending at Discharge     DETAILS OF HOSPITAL STAY   Presenting Problem/History of Present Illness  Brain metastases (CMS/HCC) [C79.31]  Per admission HPI:  Guy Jarrett is admitted to [facility] for comprehensive inpatient rehabilitation for Brain Injury with functional deficits in cognitive function;self-care;mobility;safety. Patient is receiving the following services: occupational therapy;physical therapy;speech language pathology (test for higher level cog defecits ).  Mr. Jarrett is a 70-year-old male with known metastatic melanoma diagnosed in August 2019 receiving chemotherapy diagnosed with brain metastasis on 4/22/2020 in the right temporal lobe treated with gamma knife on 5/5/2020 found to have rapidly enlarging right frontal metastases on subsequent surveillance imaging.  He was admitted to [facility] on  8/18/2020.  Dr. Barrera from neurosurgery was consulted and performed craniotomy and mass resection.  He was started on Keppra for intraoperative seizure.  Postoperatively he was started on Decadron taper.   He was cleared to initiate heparin for DVT prophylaxis.   Next dose Nivolumab due 9/15/20.  He was ultimately determined to medically stable for transfer to [facility] on 8/21/2020 for an acute inpatient rehabilitation program to address deficits related to metastatic melanoma with brain metastases status post craniotomy and resection.    Physical Exam  Physical Exam   Constitutional: He is oriented to person, place, and time. Vital signs are normal. He appears well-developed and well-nourished.   HENT:   Head: Normocephalic. Scalp Incision extending from L temporal to R temporal lobe without sutures  Eyes: Pupils are equal, round, and reactive to light. Conjunctivae and EOM are normal.   Cardiovascular: Normal rate and regular rhythm.   Pulmonary/Chest: Effort normal and breath sounds normal.   Abdominal: Soft. Normal appearance and bowel sounds are normal.   Genitourinary:   Genitourinary Comments: No purvis catheter   Musculoskeletal: Normal range of motion.   No jt erythema, warmth or effusion   Neurological: He is alert and oriented to person, place, and time. He displays no tremor. No cranial nerve deficit or sensory deficit. He exhibits normal muscle tone. He displays no seizure activity. Coordination and gait abnormal.   Fluent, follows commands, strength LLE improving now grossly >3/5 throughout, tone 0/4.  Impaired coordination L side.   Skin: Skin is warm, dry and intact.   Psychiatric: He has a normal mood and affect. His speech is normal and behavior is normal. Cognition and memory are impaired.      Hospital Course  The patient participated in a comprehensive rehabilitation program including physical therapy, occupational therapy, speech therapy, psychology, rehabilitation nursing, while under  physiatric supervision and made good functional progress.    Functional status at discharge:   Sit to stand/stand to sit transfer-supervision  Toilet transfer-supervision  Shower transfer-supervision  Gait training-400 feet; verbal cues for pacing; touching/steadying assist  Balance-min to mod assist to maintain balance.  Patient then ambulating backwards with close supervision to minimum assist  Stairs-supervision    The patient was discharged home on 8/29/20. Will need to follow up with see above.      Day of Discharge    >30 minutes spent on discharge services.      Kapil Hidalgo DO  8/28/2020  11:00 AM   Dr. Kapil Hidalgo, PGY-2  Case discussed with TG Ambrose

## 2020-08-28 NOTE — PROGRESS NOTES
Patient: Guy Jarrett  Location: [facility] [address]  MRN: [id number]  Today's date: 8/28/2020    History of Present Illness  Guy is a 70 y.o. male admitted on 8/21/2020 with Brain metastases (CMS/HCC).     Pt is a 71 y/o male admitted to [facility] on 8/21/20 s/p craniotomy and mass resection. Pt with known metastatic melanoma diagnosed in August 2019 receiving chemotherapy diagnosed with brain metastasis on 4/22/2020 in the right temporal lobe treated with gamma knife on 5/5/2020 found to have rapidly enlarging right frontal metastases on subsequent surveillance imaging.  He was admitted to [facility] on 8/18/2020.  Dr. Barrera from neurosurgery was consulted and performed craniotomy and mass resection.    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    PT Vitals    Date/Time Pulse BP BP Location BP Method Pt Position [facility]   08/28/20 1303 54 135/63 Left upper arm Automatic Sitting LB      PT Pain    Date/Time Rating: Rest [facility]   08/28/20 1303 0 - no pain LB   08/28/20 1355 0 - no pain LB          Prior Living Environment      Most Recent Value   Lives With  spouse   Living Arrangements  house [2SH]   Living Environment Comment  Pt lives with wife Amlka,   Location, Kitchen  first (main) floor   Kitchen Access Comment  wife performs cooking tasks   Location, Laundry Room  second floor, must negotiate stairs to access   Laundry Room Access Comment  wife performs laundry (pt reports he assists at times)   Location, Patient Bedroom  second floor, must negotiate stairs to access   Location, Bathroom  second floor, must negotiate stairs to access   Bathroom Access Comment  walk-in shower s GB, standard height toilet          Prior Level of Function      Most Recent Value   Dominant Hand  right   Ambulation  independent   Transferring   independent   Toileting  independent   Bathing  independent   Dressing  independent   Eating  independent   Prior Level of Function Comment  Independent PTA. (+) . Enjoys golf and household projects.   Assistive Device/Animal Currently Used at Home  none          IRF PT Evaluation and Treatment - 08/28/20 1307        Time Calculation    Start Time  1300     Stop Time  1400     Time Calculation (min)  60 min        Session Details    Document Type  discharge evaluation     Mode of Treatment  physical therapy;individual therapy        General Information    General Observations of Patient  Pleasant, cooperative, motivated        Cognition/Psychosocial    Comment, Cognition  A&O x4        Sensory Assessment (Somatosensory)    Left LE Sensory Assessment  light touch awareness;light touch localization;proprioception;intact    proprioception intact at great toe    Right LE Sensory Assessment  light touch awareness;light touch localization;proprioception;intact    proprioception intact at great toe    Sensory Subjective Reports  other (see comments)    denies sensory changes in BLE       Range of Motion (ROM)    Range of Motion  bilateral lower extremities;ROM is WFL        Strength (Manual Muscle Testing)    Hip, Left (Strength)  Hip flex 3+/5, ext 3+/5, ER 3-/5     Knee, Left (Strength)  Ext 4/5, flex 3+/5     Ankle, Left (Strength)  DF 5/5     Right Lower Extremity Strength  right LE strength is WFL        Bed Mobility    [place], Roll Left  modified independence     [place], Roll Right  modified independence     [place], Supine to Sit  modified independence     [place], Sit to Supine  modified independence     Assistive Device (Bed Mobility)  none        Bed to Chair Transfer    [place], Bed to Chair  modified independence     Comment  Ambulates to/from bed without AD        Chair to Bed Transfer    [place], Chair to Bed  modified independence     Comment  Ambulates to/from bed  "without AD        Sit to Stand Transfer    [place], Sit to Stand Transfer  modified independence     Assistive Device  none        Stand to Sit Transfer    [place], Stand to Sit Transfer  modified independence     Assistive Device  none        Car Transfer    [place], Car Transfer  modified independence     Assistive Device  none     Comment  via ambulatory approach        Gait Training    [place], Gait  modified independence     Assistive Device  none     Distance in Feet  400 feet    x2    Left Sided Gait Deviations  heel strike decreased     [place], Picking Up Object  modified independence     Comment  over indoor level surfaces (tile, carpet, elevator threshold)        Curb Negotiation (Mobility)    [place]  supervision;verbal cues     Assistive Device  none     Curb Height  6 inches     Comment  up/down 6+2\" curb with VC for safety        Rough/Uneven Surface Gait Skills (Mobility)    [place]  supervision;verbal cues     Assistive Device  none     Distance in Feet  500 feet     Comment  Outdoor ambulation over uneven sidewalks, inclines; VC for safety        Sloped Surface Gait Skills (Mobility)    [place]  supervision;verbal cues     Comment  up/down 5' ramp without AD, VC for safety        Stairs Training    [place], Stairs  modified independence     Assistive Device  railing     Handrail Location  right side (ascending);left side (descending)     Number of Stairs  16     Stair Height  6 inches     Ascending Stairs Technique  step-over-step     Descending Stairs Technique  step-to-step    LLE leading       Wheelchair Mobility/Management    Comment, Functional Mobility  No longer utilizing W/C for hospital mobility        Balance    Static Sitting Balance  WFL     Dynamic Sitting Balance  WFL        Estrella Balance Scale    Sitting to Standing  Able to stand without using hands and stabilize independently     Standing Unsupported  Able to stand safely for 2 minutes  "    Sitting with Back Unsupported but Feet Supported on Floor or on a Stool  Able to sit safely and securely for 2 minutes     Standing to Sitting  Sits safely with minimal use of hands     Transfers  Able to transfer safely with minor use of hands     Standing Unsupported with Eyes Closed  Able to stand 10 seconds safely     Standing Unsupported with Feet Together  Able to place feet together independently and stand 1 minute safely     Reach Forward with Outstretched Arm While Standing  Can reach forward confidently 25 cm (10 inches)      Object from Floor from a Standing Position  Able to  slipper safely and easily     Turning to Look Behind Over Left and Right Shoulders While Standing  Looks behind from both sides and weight shifts well     Turn 360 Degrees  Able to turn 360 degrees safely but slowly     Place Alternate Foot on Step or Stool While Standing Unsupported  Able to complete 4 steps without aid with supervision     Standing Unsupported One Foot in Front  Able to place foot tandem independently and hold 30 seconds     Standing on One Leg  Able to lift leg independently and hold greater than or equal to 3 seconds     Estrella Balance Score  50        Motor Skills    Motor Skills  coordination;functional endurance;muscle tone;postural deviations     Coordination  left;lower extremity;gross motor deficit;minimal impairment     Functional Endurance  Good     Muscle Tone  bilateral;lower extremity(s);WNL        Postural Deviations    Head and Neck  forward head     Shoulder  left shoulder forward;right shoulder forward     Upper Back  abducted scapulae     Pelvis  posterior pelvic tilt     Comment, Postural Deviations  mild deviations as described above        Lower Extremity (Therapeutic Exercise)    Exercise Position/Type (LE Therapeutic Exercise)  supine;side lying     Comment (LE Therapeutic Exercise)  Supine: L hip flexion SLR 10x3  Sidelying: L hip ER 10x3        Aerobic Exercise    Type  (Aerobic Exercise)  recumbent elliptical      Time Performed (Aerobic Exercise)  10     Comment (Aerobic Exercise)  NuStep BLE only @ level 6        Discharge Summary (PT)    Outcomes Achieved/Progress Made Upon Discharge (PT)  all goals met within established timeframes     Transfer to Another Level of Care or Facility (PT)  patient to receive therapy via outpatient clinic     Discharge Summary Statement (PT)  Patient has made very good progress with PT since initial evaluation. Currently Mod I for bed mobility, transfers, indoor ambulation, and stair negotiation. Recommending S for safety initially for outdoor ambulation. Estrella Balance Scale score improved from 36/56 to 50/56; now indicating low fall risk. Patient is scheduled to D/C home tomorrow with family to provide ongoing support and patient to receive outpatient services.               IRF PT Goals      Most Recent Value   Bed Mobility Goal 1   Activity/Assistive Device  sit to supine/supine to sit, rolling to left, rolling to right at 08/26/2020 0836   [place]  modified independence at 08/26/2020 0836   Time Frame  short-term goal (STG), 1 week at 08/26/2020 0836   Progress/Outcome  goal met at 08/28/2020 1307   Bed Mobility Goal 2   Activity/Assistive Device  sit to supine/supine to sit, rolling to left, rolling to right at 08/22/2020 1100   [place]  modified independence at 08/22/2020 1100   Time Frame  long-term goal (LTG), 21 days or less at 08/22/2020 1100   Progress/Outcome  goal met at 08/28/2020 1307   Transfer Goal 1   Activity/Assistive Device  sit-to-stand/stand-to-sit, bed-to-chair/chair-to-bed, stand pivot, no assistive device at 08/26/2020 0836   [place]  supervision required, verbal cues required at 08/26/2020 0836   Time Frame  short-term goal (STG), 1 week at 08/26/2020 0836   Progress/Outcome  goal met at 08/28/2020 1307   Transfer Goal 2   Activity/Assistive Device  sit-to-stand/stand-to-sit,  bed-to-chair/chair-to-bed, car transfer, stand pivot at 08/22/2020 1100   [place]  modified independence at 08/22/2020 1100   Time Frame  long-term goal (LTG), 21 days or less at 08/22/2020 1100   Progress/Outcome  goal met at 08/28/2020 1307   Gait/Walking Locomotion Goal 1   Activity/Assistive Device  gait (walking locomotion), no assistive device at 08/26/2020 0836   Distance  200 feet at 08/26/2020 0836   [place]  supervision required, verbal cues required at 08/26/2020 0836   Time Frame  short-term goal (STG), 1 week at 08/26/2020 0836   Progress/Outcome  goal met at 08/28/2020 1307   Gait/Walking Locomotion Goal 2   Activity/Assistive Device  gait (walking locomotion) at 08/22/2020 1100   Distance  200 feet at 08/22/2020 1100   [place]  modified independence at 08/22/2020 1100   Time Frame  long-term goal (LTG), 21 days or less at 08/22/2020 1100   Progress/Outcome  goal met at 08/28/2020 1307   Stairs Goal 1   Activity/Assistive Device  ascending stairs, descending stairs, using handrail, right, step-to-step at 08/26/2020 0836   Number of Stairs  12 at 08/26/2020 0836   [place]  supervision required, verbal cues required at 08/26/2020 0836   Time Frame  short-term goal (STG), 1 week at 08/26/2020 0836   Progress/Outcome  goal met at 08/28/2020 1307   Stairs Goal 2   Activity/Assistive Device  ascending stairs, descending stairs, using handrail, right, step-to-step at 08/22/2020 1100   Number of Stairs  12 at 08/22/2020 1100   [place]  modified independence at 08/22/2020 1100   Time Frame  long-term goal (LTG), 21 days or less at 08/22/2020 1100   Progress/Outcome  goal met at 08/28/2020 1307

## 2020-08-28 NOTE — PLAN OF CARE
Problem: Rehabilitation (IRF) Plan of Care  Goal: Plan of Care Review  Outcome: Progressing  Flowsheets (Taken 8/28/2020 0216)  Plan of Care Reviewed With: patient  IRF Plan of Care Review: progress ongoing, continue  Outcome Summary: Pt denied pain, walked to the bathroom, made his needs known, sleeping good

## 2020-08-28 NOTE — PLAN OF CARE
Problem: Rehabilitation (IRF) Plan of Care  Goal: Plan of Care Review  Flowsheets (Taken 8/28/2020 6301)  Plan of Care Reviewed With: patient  IRF Plan of Care Review: progress ongoing, continue  Outcome Summary: Estrella Balance Scale improved to 50/56. Patient Mod I on unit without AD.

## 2020-08-28 NOTE — DISCHARGE INSTR - ACTIVITY
Occupational Therapy     Toilet Transfers: Modified independent walking without assistive device onto standard height toilet with toilet safety frame.     Shower/Tub Transfers: Modified independent side-stepping over shower threshold onto shower chair.    Upper Body Dressing: Modified independent to don overhead shirt while seated.     Lower Body Dressing: Modified independent to don underwear, pants, socks, shoes, and tie shoes while seated. May pull pants up in stand.    Bathing: Modified independent to wash all areas while seated on shower chair.    Toileting: Modified independent to complete all toileting tasks.     Grooming: Modified independent to complete all grooming tasks in stand at sink.    Household Mobility/Household Activity: Modified independent to complete household mobility without use of assistive device. Modified independent to complete item retrieval and item transport, recommending frequently used items by kept in a place that is easily accessible (not high up or low close to floor). Recommending assist for laundry and meal preparation.     Driving: Driving is unsafe and not recommended at this time. Consult your physician for approval before resuming driving. Driving letter submitted.     DME: shower chair, toilet safety frames, grab bars in shower    Upper Extremity Management: Bill presents with bilateral weakness on both arms, greater limitations noted on right hand due to cervical injury prior to admission. Recommending following up with neurologist to determine resuming therapist related to cervical region of spine. May continued to complete bilateral hand strengthening utilizing theraputty and hand exerciser.     Vision: Recommend follow up with ophthalmologist at discharge    Other: Please remember to take your time with activities and walking, do not rush. Listen to how your body is feeling and take rest breaks as needed.     Entered by: Sheridan Florence OT on 8/28/2020      Physical  Therapy:      Bed mobility: Modified independent    Transfers: Modified independent without a device. Take your time and pay attention to left leg placement!    Ambulation: Modified independent without a device in the home. Recommend having someone with you initially if walking outdoors/over uneven terrain/for long distances. Take your time and pay attention to left foot (heel-toe).     Elevations: Modified independent while holding onto at least 1 railing. Take your time.     Wheelchair Mobility: N/A    Entered by: Leslie Bainbridge, PT on: 8/28/2020

## 2020-08-28 NOTE — PLAN OF CARE
Problem: Rehabilitation (IRF) Plan of Care  Goal: Plan of Care Review  Outcome: Progressing  Flowsheets  Taken 8/28/2020 1801 by Clau Fowler LPN  Plan of Care Reviewed With: patient  Outcome Summary: Discussing protocol for discharge in am.  Taken 8/28/2020 1504 by Asia Artis OT  IRF Plan of Care Review: progress ongoing, continue

## 2020-08-29 VITALS
WEIGHT: 167.11 LBS | RESPIRATION RATE: 18 BRPM | HEART RATE: 51 BPM | DIASTOLIC BLOOD PRESSURE: 67 MMHG | HEIGHT: 69 IN | OXYGEN SATURATION: 98 % | TEMPERATURE: 97.5 F | BODY MASS INDEX: 24.75 KG/M2 | SYSTOLIC BLOOD PRESSURE: 118 MMHG

## 2020-08-29 LAB
GLUCOSE BLD-MCNC: 110 MG/DL (ref 70–99)
POCT TEST: ABNORMAL

## 2020-08-29 PROCEDURE — 63600000 HC DRUGS/DETAIL CODE: Performed by: STUDENT IN AN ORGANIZED HEALTH CARE EDUCATION/TRAINING PROGRAM

## 2020-08-29 PROCEDURE — 63700000 HC SELF-ADMINISTRABLE DRUG: Performed by: HOSPITALIST

## 2020-08-29 PROCEDURE — 63700000 HC SELF-ADMINISTRABLE DRUG: Performed by: PHYSICAL MEDICINE & REHABILITATION

## 2020-08-29 RX ORDER — LEVETIRACETAM 500 MG/1
500 TABLET ORAL 2 TIMES DAILY
Qty: 60 TABLET | Refills: 0 | Status: CANCELLED | OUTPATIENT
Start: 2020-08-29 | End: 2020-09-28

## 2020-08-29 RX ORDER — PANTOPRAZOLE SODIUM 40 MG/1
40 TABLET, DELAYED RELEASE ORAL DAILY
Qty: 30 TABLET | Refills: 0 | Status: SHIPPED | OUTPATIENT
Start: 2020-08-30 | End: 2020-09-29

## 2020-08-29 RX ADMIN — PANTOPRAZOLE SODIUM 40 MG: 40 TABLET, DELAYED RELEASE ORAL at 08:08

## 2020-08-29 RX ADMIN — ATORVASTATIN CALCIUM 10 MG: 10 TABLET, FILM COATED ORAL at 08:08

## 2020-08-29 RX ADMIN — DEXAMETHASONE 4 MG: 4 TABLET ORAL at 08:09

## 2020-08-29 RX ADMIN — LISINOPRIL 10 MG: 10 TABLET ORAL at 08:08

## 2020-08-29 RX ADMIN — LEVETIRACETAM 500 MG: 500 TABLET ORAL at 08:07

## 2020-08-29 NOTE — PLAN OF CARE
Problem: Rehabilitation (IRF) Plan of Care  Goal: Plan of Care Review  Outcome: Met   Pt dischar  Problem: Rehabilitation (IRF) Plan of Care  Goal: Plan of Care Review  8/29/2020 1047 by Estrella Britt RN  Outcome: Met  Flowsheets (Taken 8/29/2020 1047)  Plan of Care Reviewed With: spouse; patient  IRF Plan of Care Review: discharged  Outcome Summary: Pt home with wife. Reviewed after visit summary and dicharge instructions. Provided all handouts. Pt has no complaints. All questions answered.

## 2020-08-29 NOTE — PLAN OF CARE
Problem: Rehabilitation (IRF) Plan of Care  Goal: Plan of Care Review  Outcome: Progressing  Flowsheets (Taken 8/28/2020 2208)  Plan of Care Reviewed With: patient  IRF Plan of Care Review: progress ongoing, continue  Outcome Summary: Reviewed dischare instructions with pt

## 2020-08-29 NOTE — DISCHARGE INSTRUCTIONS
Please follow-up:  PCP after discharge  Neurosurgeon Dr. Beau Barrera at [facility] in 4 to 6 weeks [phone].    Oncology NP Chyna Pugh 9/15/2020 10am [phone] for next round of chemotherapy.

## 2020-08-29 NOTE — PLAN OF CARE
Problem: Rehabilitation (IRF) Plan of Care  Goal: Plan of Care Review  Outcome: Progressing  Flowsheets (Taken 8/29/2020 0442)  IRF Plan of Care Review: progress ongoing, continue  Outcome Summary: pt slept good for discharge this am

## 2021-04-08 DIAGNOSIS — Z23 ENCOUNTER FOR IMMUNIZATION: ICD-10-CM

## 2022-07-20 NOTE — ASSESSMENT & PLAN NOTE
MRI of the MRI of the brain showed 2 cm centrally necrotic and or hemorrhagic mass lesion in the right posterior frontal lobe extending to the dural surface of the falx, likely involving supplementary motor cortex with marked surrounding vasogenic edema and local mass-effect without evidence of midline shift or herniation.  There was redemonstration of tiny treated right temporal metastasis significantly decreased in size when compared with treatment planning study.  Continue Decadron taper and levetiracetam.  Follow-up immunotherapy 9/15/2020.       PSR LM advising patient to call to schedule CPE and see if insurance is correct on file.   Please call patient to schedule annual appt with PCP.    None

## 2023-07-20 ENCOUNTER — APPOINTMENT (OUTPATIENT)
Dept: RADIOLOGY | Facility: CLINIC | Age: 73
End: 2023-07-20
Payer: COMMERCIAL

## 2023-07-20 DIAGNOSIS — M25.552 LEFT HIP PAIN: ICD-10-CM

## 2023-07-20 PROCEDURE — 73502 X-RAY EXAM HIP UNI 2-3 VIEWS: CPT

## 2023-12-20 NOTE — PROGRESS NOTES
Patient: Guy Jarrett  Location: [facility] [address]  MRN: [id number]  Today's date: 8/28/2020    History of Present Illness  Guy is a 70 y.o. male admitted on 8/21/2020 with Brain metastases (CMS/HCC).     Pt is a 71 y/o male admitted to [facility] on 8/21/20 s/p craniotomy and mass resection. Pt with known metastatic melanoma diagnosed in August 2019 receiving chemotherapy diagnosed with brain metastasis on 4/22/2020 in the right temporal lobe treated with gamma knife on 5/5/2020 found to have rapidly enlarging right frontal metastases on subsequent surveillance imaging.  He was admitted to [facility] on 8/18/2020.  Dr. Barrera from neurosurgery was consulted and performed craniotomy and mass resection.    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        Prior Living Environment      Most Recent Value   Lives With  spouse   Living Arrangements  house [2SH]   Living Environment Comment  Pt lives with wife Malka,   Location, Kitchen  first (main) floor   Kitchen Access Comment  wife performs cooking tasks   Location, Laundry Room  second floor, must negotiate stairs to access   Laundry Room Access Comment  wife performs laundry (pt reports he assists at times)   Location, Patient Bedroom  second floor, must negotiate stairs to access   Location, Bathroom  second floor, must negotiate stairs to access   Bathroom Access Comment  walk-in shower s GB, standard height toilet          Prior Level of Function      Most Recent Value   Dominant Hand  right   Ambulation  independent   Transferring  independent   Toileting  independent   Bathing  independent   Dressing  independent   Eating  independent   Prior Level of Function Comment  Independent PTA. (+) . Enjoys golf and household projects.   Assistive Device/Animal Currently Used  at Home  none          IRF OT Evaluation and Treatment - 08/28/20 1402        Time Calculation    Start Time  1405     Stop Time  1445     Time Calculation (min)  40 min        Session Details    Document Type  daily treatment/progress note     Mode of Treatment  occupational therapy;group therapy        Daily Progress Summary (OT)    Daily Outcome Statement (OT)  Patient attended falls education group today.  Class included review of safety while in the hospital as well as recommendations for home safety after discharge. Patient received written handout regarding safety and falls prevention in the home.                        Education provided this session. See the Patient Education summary report for full details.    IRF OT Goals      Most Recent Value   Transfer Goal 1   Activity/Assistive Device  toilet at 08/25/2020 1434   [place]  supervision required at 08/25/2020 1434   Time Frame  short-term goal (STG), 1 week at 08/25/2020 1434   Progress/Outcome  goal met at 08/28/2020 0712   Transfer Goal 2   Activity/Assistive Device  toilet at 08/22/2020 0704   [place]  modified independence at 08/22/2020 0704   Time Frame  long-term goal (LTG), 4 weeks at 08/22/2020 0704   Progress/Outcome  goal met at 08/28/2020 0712   Transfer Goal 3   Activity/Assistive Device  shower at 08/25/2020 1434   [place]  supervision required at 08/25/2020 1434   Time Frame  short-term goal (STG), 1 week at 08/25/2020 1434   Progress/Outcome  goal met at 08/28/2020 0712   Transfer Goal 4   Activity/Assistive Device  shower at 08/22/2020 0704   [place]  modified independence at 08/22/2020 0704   Time Frame  long-term goal (LTG), 4 weeks at 08/22/2020 0704   Progress/Outcome  goal met at 08/28/2020 0712   Bathing Goal 1   Activity/Assistive Device  bathing skills, all at 08/25/2020 1434   [place]  supervision required at 08/25/2020 1434   Time Frame  short-term goal (STG), 1 week at 08/25/2020 1434    Progress/Outcome  goal met at 08/28/2020 0712   Bathing Goal 2   Activity/Assistive Device  bathing skills, all at 08/22/2020 0704   [place]  modified independence at 08/22/2020 0704   Time Frame  long-term goal (LTG), 4 weeks at 08/22/2020 0704   Progress/Outcome  goal met at 08/28/2020 0712   UB Dressing Goal 1   Activity/Assistive Device  upper body dressing at 08/25/2020 1434   [place]  supervision required at 08/25/2020 1434   Time Frame  short-term goal (STG), 1 week at 08/25/2020 1434   Strategies/Barriers  including item retrieval at 08/25/2020 1434   Progress/Outcome  goal met at 08/28/2020 0712   UB Dressing Goal 2   Activity/Assistive Device  upper body dressing at 08/22/2020 0704   [place]  modified independence at 08/22/2020 0704   Time Frame  long-term goal (LTG), 4 weeks at 08/22/2020 0704   Progress/Outcome  goal met at 08/28/2020 0712   LB Dressing Goal 1   Activity/Assistive Device  lower body dressing at 08/25/2020 1434   [place]  supervision required at 08/25/2020 1434   Time Frame  short-term goal (STG), 1 week at 08/25/2020 1434   Strategies/Barriers  including item retrieval at 08/25/2020 1434   Progress/Outcome  goal met at 08/28/2020 0712   LB Dressing Goal 2   Activity/Assistive Device  lower body dressing at 08/22/2020 0704   [place]  modified independence at 08/22/2020 0704   Time Frame  long-term goal (LTG), 4 weeks at 08/22/2020 0704   Progress/Outcome  goal met at 08/28/2020 0712   Grooming Goal 1   Activity/Assistive Device  grooming skills, all at 08/25/2020 1434   [place]  supervision required at 08/25/2020 1434   Time Frame  short-term goal (STG), 1 week at 08/25/2020 1434   Progress/Outcome  goal met at 08/28/2020 0712   Grooming Goal 2   Activity/Assistive Device  grooming skills, all at 08/22/2020 0704   [place]  modified independence at 08/22/2020 0704   Time Frame  long-term goal (LTG), 4 weeks at 08/22/2020 0704   Progress/Outcome  goal  met at 08/28/2020 0712   Toileting Goal 1   Activity/Assistive Device  toileting skills, all at 08/25/2020 1434   [place]  supervision required at 08/25/2020 1434   Time Frame  short-term goal (STG), 1 week at 08/25/2020 1434   Progress/Outcome  goal met at 08/28/2020 0712   Toileting Goal 2   Activity/Assistive Device  toileting skills, all at 08/22/2020 0704   [place]  modified independence at 08/22/2020 0704   Time Frame  long-term goal (LTG), 4 weeks at 08/22/2020 0704   Progress/Outcome  goal met at 08/28/2020 0712         Yes

## 2024-02-29 NOTE — PLAN OF CARE
Problem: Rehabilitation (IRF) Plan of Care  Goal: Plan of Care Review  Outcome: Progressing  Flowsheets (Taken 8/23/2020 0448)  Plan of Care Reviewed With: patient  IRF Plan of Care Review: progress ongoing, continue  Outcome Summary: Patient sleeping well overnight. Patient did not have any complaints. Continent of bladder. SRx4 maintained. Will continue to monitor.      [phone]     Pharmacy - [facility]